# Patient Record
Sex: FEMALE | Race: BLACK OR AFRICAN AMERICAN | NOT HISPANIC OR LATINO | Employment: FULL TIME | URBAN - METROPOLITAN AREA
[De-identification: names, ages, dates, MRNs, and addresses within clinical notes are randomized per-mention and may not be internally consistent; named-entity substitution may affect disease eponyms.]

---

## 2017-01-08 RX ORDER — FLUTICASONE PROPIONATE 50 MCG
SPRAY, SUSPENSION (ML) NASAL
Qty: 16 BOTTLE | Refills: 0 | Status: SHIPPED | OUTPATIENT
Start: 2017-01-08 | End: 2017-09-28

## 2017-03-14 ENCOUNTER — PATIENT MESSAGE (OUTPATIENT)
Dept: FAMILY MEDICINE | Facility: CLINIC | Age: 52
End: 2017-03-14

## 2017-03-14 DIAGNOSIS — Z23 NEED FOR MMR VACCINE: Primary | ICD-10-CM

## 2017-03-15 ENCOUNTER — CLINICAL SUPPORT (OUTPATIENT)
Dept: FAMILY MEDICINE | Facility: CLINIC | Age: 52
End: 2017-03-15
Payer: COMMERCIAL

## 2017-03-15 PROCEDURE — 90707 MMR VACCINE SC: CPT | Mod: S$GLB,,, | Performed by: FAMILY MEDICINE

## 2017-03-15 PROCEDURE — 99999 PR PBB SHADOW E&M-EST. PATIENT-LVL I: CPT | Mod: PBBFAC,,,

## 2017-03-15 PROCEDURE — 90471 IMMUNIZATION ADMIN: CPT | Mod: S$GLB,,, | Performed by: FAMILY MEDICINE

## 2017-06-16 ENCOUNTER — PATIENT MESSAGE (OUTPATIENT)
Dept: FAMILY MEDICINE | Facility: CLINIC | Age: 52
End: 2017-06-16

## 2017-06-16 DIAGNOSIS — M25.539 PAIN IN WRIST, UNSPECIFIED LATERALITY: ICD-10-CM

## 2017-06-16 DIAGNOSIS — M25.512 LEFT SHOULDER PAIN, UNSPECIFIED CHRONICITY: Primary | ICD-10-CM

## 2017-06-20 ENCOUNTER — PATIENT MESSAGE (OUTPATIENT)
Dept: FAMILY MEDICINE | Facility: CLINIC | Age: 52
End: 2017-06-20

## 2017-07-06 ENCOUNTER — OFFICE VISIT (OUTPATIENT)
Dept: FAMILY MEDICINE | Facility: CLINIC | Age: 52
End: 2017-07-06
Payer: COMMERCIAL

## 2017-07-06 VITALS
SYSTOLIC BLOOD PRESSURE: 122 MMHG | TEMPERATURE: 99 F | RESPIRATION RATE: 18 BRPM | BODY MASS INDEX: 21.97 KG/M2 | HEIGHT: 67 IN | WEIGHT: 140 LBS | HEART RATE: 89 BPM | DIASTOLIC BLOOD PRESSURE: 84 MMHG

## 2017-07-06 DIAGNOSIS — J30.2 SEASONAL ALLERGIC RHINITIS, UNSPECIFIED ALLERGIC RHINITIS TRIGGER: Primary | ICD-10-CM

## 2017-07-06 PROCEDURE — 99213 OFFICE O/P EST LOW 20 MIN: CPT | Mod: 25,S$GLB,, | Performed by: FAMILY MEDICINE

## 2017-07-06 PROCEDURE — 96372 THER/PROPH/DIAG INJ SC/IM: CPT | Mod: S$GLB,,, | Performed by: FAMILY MEDICINE

## 2017-07-06 PROCEDURE — 99999 PR PBB SHADOW E&M-EST. PATIENT-LVL III: CPT | Mod: PBBFAC,,, | Performed by: FAMILY MEDICINE

## 2017-07-06 RX ORDER — BETAMETHASONE SODIUM PHOSPHATE AND BETAMETHASONE ACETATE 3; 3 MG/ML; MG/ML
12 INJECTION, SUSPENSION INTRA-ARTICULAR; INTRALESIONAL; INTRAMUSCULAR; SOFT TISSUE
Status: COMPLETED | OUTPATIENT
Start: 2017-07-06 | End: 2017-07-06

## 2017-07-06 RX ADMIN — BETAMETHASONE SODIUM PHOSPHATE AND BETAMETHASONE ACETATE 12 MG: 3; 3 INJECTION, SUSPENSION INTRA-ARTICULAR; INTRALESIONAL; INTRAMUSCULAR; SOFT TISSUE at 11:07

## 2017-07-06 NOTE — PROGRESS NOTES
CHIEF COMPLAINT: This is a 52-year-old female complaining of upper respiratory symptoms.    SUBJECTIVE: The patient complains of four-day history of sore throat, which is scratchy in quality.  She complains of sneezing, coughing, postnasal drip and headache.  Mucus is clear to green in color.  She denies fever or chills.  She's been taking Zyrtec and nasal steroids.    Patient complains of hot flashes and requests advice regarding supplements.    ROS:  GENERAL: Patient denies fever, chills, night sweats.  Patient denies weight gain or loss. Patient denies anorexia, fatigue, weakness or swollen glands.  SKIN: Patient denies rash.  HEENT: Patient denies ear pain, hearing loss, visual disturbance, eye irritation or discharge.  LUNGS: Patient denies wheeze or hemoptysis.  CARDIOVASCULAR: Patient denies chest pain, shortness of breath, palpitations, syncope or lower extremity edema.  GI: Patient denies abdominal pain, nausea, vomiting, diarrhea, constipation, blood in stool or melena.     OBJECTIVE:   GENERAL: Well-developed well-nourished, black female alert and oriented x3 in no acute distress.  Memory, judgment and cognition without deficit.  No audible wheezing.  SKIN: Clear without rash.  Normal color and tone.  HEENT: Eyes: Clear conjunctivae.  No scleral icterus.  Ears: Clear canals.  Clear TMs.  Nose: Moderate bilateral congestion.  Pharynx: Without injection or exudates.  NECK: Supple, normal range of motion.  No lymphadenopathy.    LUNGS: Clear to auscultation.  Normal respiratory effort.  CARDIOVASCULAR: Regular rhythm, normal S1, S2 without murmur, gallop or rub.    Discussed treatment of symptomatic menopause.  Reviewed options, including ERT, SSRI/SNRIs and supplements.    ASSESSMENT:  1. Seasonal allergic rhinitis, unspecified allergic rhinitis trigger      PLAN:   1.  Celestone 12 mg IM.  2.  Continue nonsedating antihistamine and nasal steroids.  3.  Patient will try OTC supplements for hot flashes.  4.   Follow-up if no improvement or worsening symptoms.

## 2017-07-09 ENCOUNTER — PATIENT MESSAGE (OUTPATIENT)
Dept: FAMILY MEDICINE | Facility: CLINIC | Age: 52
End: 2017-07-09

## 2017-07-10 RX ORDER — AMOXICILLIN 875 MG/1
875 TABLET, FILM COATED ORAL 2 TIMES DAILY
Qty: 20 TABLET | Refills: 0 | Status: SHIPPED | OUTPATIENT
Start: 2017-07-10 | End: 2017-07-20

## 2017-07-12 DIAGNOSIS — M25.539 WRIST PAIN, UNSPECIFIED LATERALITY: Primary | ICD-10-CM

## 2017-07-12 DIAGNOSIS — M25.512 LEFT SHOULDER PAIN, UNSPECIFIED CHRONICITY: ICD-10-CM

## 2017-07-14 ENCOUNTER — HOSPITAL ENCOUNTER (OUTPATIENT)
Dept: RADIOLOGY | Facility: HOSPITAL | Age: 52
Discharge: HOME | End: 2017-07-14
Attending: ORTHOPAEDIC SURGERY
Payer: COMMERCIAL

## 2017-07-14 ENCOUNTER — OFFICE VISIT (OUTPATIENT)
Dept: ORTHOPEDICS | Facility: CLINIC | Age: 52
End: 2017-07-14
Payer: COMMERCIAL

## 2017-07-14 VITALS — HEIGHT: 67 IN | WEIGHT: 140 LBS | BODY MASS INDEX: 21.97 KG/M2

## 2017-07-14 DIAGNOSIS — M25.512 LEFT SHOULDER PAIN, UNSPECIFIED CHRONICITY: ICD-10-CM

## 2017-07-14 DIAGNOSIS — M25.539 WRIST PAIN, UNSPECIFIED LATERALITY: ICD-10-CM

## 2017-07-14 DIAGNOSIS — M25.512 ACUTE PAIN OF LEFT SHOULDER: Primary | ICD-10-CM

## 2017-07-14 PROCEDURE — 73110 X-RAY EXAM OF WRIST: CPT | Mod: 26,50,, | Performed by: RADIOLOGY

## 2017-07-14 PROCEDURE — 99204 OFFICE O/P NEW MOD 45 MIN: CPT | Mod: S$GLB,,, | Performed by: PHYSICIAN ASSISTANT

## 2017-07-14 PROCEDURE — 99999 PR PBB SHADOW E&M-EST. PATIENT-LVL III: CPT | Mod: PBBFAC,,, | Performed by: PHYSICIAN ASSISTANT

## 2017-07-14 PROCEDURE — 73030 X-RAY EXAM OF SHOULDER: CPT | Mod: TC,PO,LT

## 2017-07-14 PROCEDURE — 73110 X-RAY EXAM OF WRIST: CPT | Mod: 50,TC,PO

## 2017-07-14 PROCEDURE — 73030 X-RAY EXAM OF SHOULDER: CPT | Mod: 26,LT,, | Performed by: RADIOLOGY

## 2017-07-14 NOTE — PROGRESS NOTES
CC: 52-year-old female left shoulder pain    HPI: 2 month history of of left shoulder pain.  She is working out doing her exercises and felt a pop in her left shoulder.  Not worked since that time.  Taken some NSAIDs with minimal relief.  Pain increases with any activity especially overhead activity.  She has altered her ADLS due to pain.    PMH:    Past Medical History:   Diagnosis Date    DDD (degenerative disc disease), cervical     Hyperlipidemia     Hypertension     Leiomyoma     Parkinson's disease     Polymenorrhea     Seasonal allergies        PSH:    Past Surgical History:   Procedure Laterality Date     SECTION, LOW TRANSVERSE      GANGLION CYST EXCISION      HERNIA REPAIR      Lysis of adhesions and enterotomy closure  2013    ROBOTIC ASSISTED HYSTERECTOMY  2013    Submaxillary gland drainage         Family Hx:    Family History   Problem Relation Age of Onset    Hypertension Mother     Cancer Mother     Diabetes Father     Hypertension Father     Hypertension Sister     Thyroid disease Sister     Hypertension Brother     Hypertension Sister     Hypertension Sister     Hypertension Brother     Cataracts Maternal Grandmother        Allergy:    Review of patient's allergies indicates:   Allergen Reactions    Allegra-d 12 hour [fexofenadine-pseudoephedrine] Palpitations    Celestone [betamethasone sodium phosphate] Palpitations       Medication:    Current Outpatient Prescriptions:     amoxicillin (AMOXIL) 875 MG tablet, Take 1 tablet (875 mg total) by mouth 2 (two) times daily., Disp: 20 tablet, Rfl: 0    hydrochlorothiazide (MICROZIDE) 12.5 mg capsule, Take 1 capsule (12.5 mg total) by mouth once daily., Disp: 90 capsule, Rfl: 3    multivitamin capsule, Take 1 capsule by mouth once daily., Disp: , Rfl:     rasagiline (AZILECT) 1 mg Tab, Take 1 mg by mouth once daily., Disp: , Rfl:     fluticasone (FLONASE) 50 mcg/actuation nasal spray, SPRAY TWICE IN  "EACH NOSTRIL EVERY DAY, Disp: 16 Bottle, Rfl: 0    Social History:    Social History     Social History    Marital status: Single     Spouse name: N/A    Number of children: N/A    Years of education: N/A     Occupational History     [employer]     Social History Main Topics    Smoking status: Never Smoker    Smokeless tobacco: Never Used    Alcohol use No    Drug use: No    Sexual activity: Not Currently     Other Topics Concern    Not on file     Social History Narrative    The patient is  and has one child. 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                  Vitals:   Ht 5' 6.5" (1.689 m)   Wt 63.5 kg (139 lb 15.9 oz)   LMP 05/28/2013   BMI 22.26 kg/m²      ROS:  GENERAL: No fever, chills, fatigability or weight loss.  SKIN: No rashes, itching or changes in color or texture of skin.  HEAD: No headaches or recent head trauma.  EYES: Visual acuity fine. No photophobia, ocular pain or diplopia.  EARS: Denies ear pain, discharge or vertigo.  NOSE: No loss of smell, no epistaxis or postnasal drip.  MOUTH & THROAT: No hoarseness or change in voice. No excessive gum bleeding.  NODES: Denies swollen glands.  CHEST: Denies NASH, cyanosis, wheezing, cough and sputum production.  CARDIOVASCULAR: Denies chest pain, PND, orthopnea or reduced exercise tolerance.  ABDOMEN: Appetite fine. No weight loss. Denies diarrhea, abdominal pain, hematemesis or blood in stool.  URINARY: No flank pain, dysuria or hematuria.  PERIPHERAL VASCULAR: No claudication or cyanosis.  NEUROLOGIC: No history of seizures, paralysis, alteration of gait or coordination.  MUSCULOSKELETAL: See HPI    PE:  APPEARANCE: Well nourished, well developed, in no acute distress.   HEAD: Normocephalic, atraumatic.  NEUROLOGIC: Cranial Nerves: II-XII grossly intact, also see MUSCULOSKELETAL  MUSCULOSKELETAL: left Shoulder Exam     Muscle Appearance:Normal  Grooming:Normal  Spine Alignment-Normal  Muscle " Atrophy-No  Deformities-No  Tenderness-Yes  Paresthesias-No  Range of Motion-decreased         Abd Pure-decreased         Abd Combined-decreased         Ext-decreased         Flex-decreased         Internal Rot-decreased         External Rot-decreased  Muscle Strength-decreased  Sensation-normal  Reflexes-Normal  Crepitus-Yes  Impingement-Yes  Apprehension-Yes  Fatigue-Yes  Scapular Winging-No  Muscle Tightness-Yes  Instability-No  Tests on Exam-no evidence of instability  Neurovascular Status-Normal  Skin-Normal  Positive Drop Arm testYes    Assessment:           Diagnosis:              1.  Left shoulder impingement syndrome                   Diagnostic Studies  MRI-Yes  X-Ray-No  EMG/NCV-No  Arthrogram-No  Bone Scan-No  CT Scan-No  Doppler-No  ESR-No  CRP-No  CBC with Diff-No   Rheumatoid/Arthritis Panel-No      Plan:                                                 1. PT-no                                                 2.OT-no                                          3.NSAID-no                                        4. Narcotics-no                                     5. Wound care-N/A                                 6. Rest-no                                           7. Surgery-no  discuss possible ATS depending on MRI findings                                      8. MAYI Hose-no                                    9. Anticoagulation therapy-no               10. Elevation-no                                     11. Crutches-no                                    12. Walker-no             13. Cane no                        14. Referral-no                                     15.Injection-no                            16. Splint   /    Cast   /   Cast Shoe-No              17. RICE            18. Follow up-  after MRI

## 2017-07-14 NOTE — LETTER
July 14, 2017      Lalita Patterson MD  [address]  [place] [address]           [facility]  [address]  [place] [address]  Phone: [phone]  Fax: [phone]          Patient: Rosalva Stauffer   MR Number: [id number]   YOB: 1965   Date of Visit: 7/14/2017       Dear Dr. Lalita Patterson:    Thank you for referring Rosalva Stauffer to me for evaluation. Attached you will find relevant portions of my assessment and plan of care.    If you have questions, please do not hesitate to call me. I look forward to following Rosalva Stauffer along with you.    Sincerely,    Herb Dang PA-C    Enclosure  CC:  No Recipients    If you would like to receive this communication electronically, please contact [email or web address] or [phone] to request more information on [employer] access.    For providers and/or their staff who would like to refer a patient to [facility], please contact us through our one-stop-shop provider referral line, [facility] , at [phone].    If you feel you have received this communication in error or would no longer like to receive these types of communications, please e-mail [email or web address]

## 2017-07-14 NOTE — PATIENT INSTRUCTIONS
Shoulder Arthroscopy  The shoulder is your bodys most flexible joint. It lets the arm move in almost any direction. But this flexibility has a price -- it makes the joint prone to injury. If you have a shoulder problem, a surgical procedure called arthroscopy can help.     A camera in the arthroscope allows your surgeon to view your shoulder joint on a monitor.   Your orthopaedic evaluation  Your doctor will ask about your symptoms and the history of your shoulder problem. He or she will examine your shoulder and may give you tests, such as an X-ray or MRI. These help your doctor find the cause of your shoulder problem.  Arthroscopy: Looking inside your joint  Arthroscopy is a procedure that allows your doctor to see and work inside your shoulder joint. Your doctor makes small incision in your shoulder and inserts a long, thin, lighted instrument, called an arthroscope. During surgery, the arthroscope sends live video images from inside your joint to a screen that your doctor views. Using these images, your doctor can diagnose and treat your shoulder problem. Because arthroscopy uses much smaller incisions than open surgery, recovery is often shorter and less painful.  Risks and possible complications of shoulder arthroscopy  · Stiffness or ongoing pain in your shoulder  · Bleeding or blood clots  · Infection  · Damage to nerves or blood vessels  You may still need open surgery after having arthroscopy.  Date Last Reviewed: 9/10/2015  © 5667-1005 [employer]. [address] [place] [address], [place] [address]. All rights reserved. This information is not intended as a substitute for professional medical care. Always follow your healthcare professional's instructions.

## 2017-07-17 ENCOUNTER — PATIENT MESSAGE (OUTPATIENT)
Dept: FAMILY MEDICINE | Facility: CLINIC | Age: 52
End: 2017-07-17

## 2017-07-20 ENCOUNTER — TELEPHONE (OUTPATIENT)
Dept: RADIOLOGY | Facility: HOSPITAL | Age: 52
End: 2017-07-20

## 2017-07-21 ENCOUNTER — HOSPITAL ENCOUNTER (OUTPATIENT)
Dept: RADIOLOGY | Facility: HOSPITAL | Age: 52
Discharge: HOME | End: 2017-07-21
Attending: ORTHOPAEDIC SURGERY
Payer: COMMERCIAL

## 2017-07-21 DIAGNOSIS — M25.512 ACUTE PAIN OF LEFT SHOULDER: ICD-10-CM

## 2017-07-21 PROCEDURE — 73221 MRI JOINT UPR EXTREM W/O DYE: CPT | Mod: TC,PO,LT

## 2017-07-21 PROCEDURE — 73221 MRI JOINT UPR EXTREM W/O DYE: CPT | Mod: 26,LT,, | Performed by: RADIOLOGY

## 2017-07-25 ENCOUNTER — PATIENT MESSAGE (OUTPATIENT)
Dept: ORTHOPEDICS | Facility: CLINIC | Age: 52
End: 2017-07-25

## 2017-07-28 ENCOUNTER — OFFICE VISIT (OUTPATIENT)
Dept: ORTHOPEDICS | Facility: CLINIC | Age: 52
End: 2017-07-28
Payer: COMMERCIAL

## 2017-07-28 VITALS
WEIGHT: 139.75 LBS | DIASTOLIC BLOOD PRESSURE: 81 MMHG | SYSTOLIC BLOOD PRESSURE: 128 MMHG | RESPIRATION RATE: 16 BRPM | HEART RATE: 75 BPM | BODY MASS INDEX: 22.46 KG/M2 | HEIGHT: 66 IN

## 2017-07-28 DIAGNOSIS — M24.812 INTERNAL DERANGEMENT OF LEFT SHOULDER: Primary | ICD-10-CM

## 2017-07-28 DIAGNOSIS — Z01.818 PRE-OP TESTING: ICD-10-CM

## 2017-07-28 DIAGNOSIS — M75.102 TEAR OF LEFT ROTATOR CUFF, UNSPECIFIED TEAR EXTENT: Primary | ICD-10-CM

## 2017-07-28 PROCEDURE — 99213 OFFICE O/P EST LOW 20 MIN: CPT | Mod: S$GLB,,, | Performed by: PHYSICIAN ASSISTANT

## 2017-07-28 PROCEDURE — 99999 PR PBB SHADOW E&M-EST. PATIENT-LVL III: CPT | Mod: PBBFAC,,, | Performed by: PHYSICIAN ASSISTANT

## 2017-07-28 NOTE — PATIENT INSTRUCTIONS
Shoulder Arthroscopy  The shoulder is your bodys most flexible joint. It lets the arm move in almost any direction. But this flexibility has a price -- it makes the joint prone to injury. If you have a shoulder problem, a surgical procedure called arthroscopy can help.     A camera in the arthroscope allows your surgeon to view your shoulder joint on a monitor.   Your orthopaedic evaluation  Your doctor will ask about your symptoms and the history of your shoulder problem. He or she will examine your shoulder and may give you tests, such as an X-ray or MRI. These help your doctor find the cause of your shoulder problem.  Arthroscopy: Looking inside your joint  Arthroscopy is a procedure that allows your doctor to see and work inside your shoulder joint. Your doctor makes small incision in your shoulder and inserts a long, thin, lighted instrument, called an arthroscope. During surgery, the arthroscope sends live video images from inside your joint to a screen that your doctor views. Using these images, your doctor can diagnose and treat your shoulder problem. Because arthroscopy uses much smaller incisions than open surgery, recovery is often shorter and less painful.  Risks and possible complications of shoulder arthroscopy  · Stiffness or ongoing pain in your shoulder  · Bleeding or blood clots  · Infection  · Damage to nerves or blood vessels  You may still need open surgery after having arthroscopy.  Date Last Reviewed: 9/10/2015  © 6895-5148 [employer]. [address] [place] [address], [place] [address]. All rights reserved. This information is not intended as a substitute for professional medical care. Always follow your healthcare professional's instructions.

## 2017-07-28 NOTE — PROGRESS NOTES
CC: 52-year-old female left shoulder pain    HPI: 2 month history of of left shoulder pain.  She is working out doing her exercises and felt a pop in her left shoulder. Has not worked out since that time.  Taken some NSAIDs with minimal relief.  Pain increases with any activity especially overhead activity.  She has altered her ADLS due to pain.    PMH:    Past Medical History:   Diagnosis Date    DDD (degenerative disc disease), cervical     Hyperlipidemia     Hypertension     Leiomyoma     Parkinson's disease     Polymenorrhea     Seasonal allergies        PSH:    Past Surgical History:   Procedure Laterality Date     SECTION, LOW TRANSVERSE      GANGLION CYST EXCISION      HERNIA REPAIR      Lysis of adhesions and enterotomy closure  2013    ROBOTIC ASSISTED HYSTERECTOMY  2013    Submaxillary gland drainage         Family Hx:    Family History   Problem Relation Age of Onset    Hypertension Mother     Cancer Mother     Diabetes Father     Hypertension Father     Hypertension Sister     Thyroid disease Sister     Hypertension Brother     Hypertension Sister     Hypertension Sister     Hypertension Brother     Cataracts Maternal Grandmother        Allergy:    Review of patient's allergies indicates:   Allergen Reactions    Allegra-d 12 hour [fexofenadine-pseudoephedrine] Palpitations    Celestone [betamethasone sodium phosphate] Palpitations       Medication:    Current Outpatient Prescriptions:     fluticasone (FLONASE) 50 mcg/actuation nasal spray, SPRAY TWICE IN EACH NOSTRIL EVERY DAY, Disp: 16 Bottle, Rfl: 0    hydrochlorothiazide (MICROZIDE) 12.5 mg capsule, Take 1 capsule (12.5 mg total) by mouth once daily., Disp: 90 capsule, Rfl: 3    multivitamin capsule, Take 1 capsule by mouth once daily., Disp: , Rfl:     rasagiline (AZILECT) 1 mg Tab, Take 1 mg by mouth once daily., Disp: , Rfl:     Social History:    Social History     Social History    Marital  "status: Single     Spouse name: N/A    Number of children: N/A    Years of education: N/A     Occupational History     [employer]     Social History Main Topics    Smoking status: Never Smoker    Smokeless tobacco: Never Used    Alcohol use No    Drug use: No    Sexual activity: Not Currently     Other Topics Concern    Not on file     Social History Narrative    The patient is  and has one child. 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                  Vitals:   /81   Pulse 75   Resp 16   Ht 5' 6" (1.676 m)   Wt 63.4 kg (139 lb 12.4 oz)   LMP 05/28/2013   BMI 22.56 kg/m²      ROS:  GENERAL: No fever, chills, fatigability or weight loss.  SKIN: No rashes, itching or changes in color or texture of skin.  HEAD: No headaches or recent head trauma.  EYES: Visual acuity fine. No photophobia, ocular pain or diplopia.  EARS: Denies ear pain, discharge or vertigo.  NOSE: No loss of smell, no epistaxis or postnasal drip.  MOUTH & THROAT: No hoarseness or change in voice. No excessive gum bleeding.  NODES: Denies swollen glands.  CHEST: Denies NASH, cyanosis, wheezing, cough and sputum production.  CARDIOVASCULAR: Denies chest pain, PND, orthopnea or reduced exercise tolerance.  ABDOMEN: Appetite fine. No weight loss. Denies diarrhea, abdominal pain, hematemesis or blood in stool.  URINARY: No flank pain, dysuria or hematuria.  PERIPHERAL VASCULAR: No claudication or cyanosis.  NEUROLOGIC: No history of seizures, paralysis, alteration of gait or coordination.  MUSCULOSKELETAL: See HPI    PE:  APPEARANCE: Well nourished, well developed, in no acute distress.   HEAD: Normocephalic, atraumatic.  NEUROLOGIC: Cranial Nerves: II-XII grossly intact, also see MUSCULOSKELETAL  MUSCULOSKELETAL: left Shoulder Exam     Muscle Appearance:Normal  Grooming:Normal  Spine Alignment-Normal  Muscle Atrophy-No  Deformities-No  Tenderness-Yes  Paresthesias-No  Range of Motion-decreased         Abd Pure-decreased         Abd " Combined-decreased         Ext-decreased         Flex-decreased         Internal Rot-decreased         External Rot-decreased  Muscle Strength-decreased  Sensation-normal  Reflexes-Normal  Crepitus-Yes  Impingement-Yes  Apprehension-Yes  Fatigue-Yes  Scapular Winging-No  Muscle Tightness-Yes  Instability-No  Tests on Exam-no evidence of instability  Neurovascular Status-Normal  Skin-Normal  Positive Drop Arm testYes    Assessment:           Diagnosis:              1.  Left shoulder impingement syndrome               2.  Left shoulder rotator cuff tear    Diagnostic Studies  MRI-Yes agree with findings ofOsteoarthritic change at the a.c. joint with slight inferior lateral positioning type III acromion.    2.  Supra-and infraspinatus tendinitis with probable partial-thickness tears of the articular surface without a full-thickness tear noted.    3.  Subscapularis tendinitis without tear.    4.  Trace fluid noted in the subacromial and subdeltoid bursa as well as extending within the biceps tendon sheath.  X-Ray-No      Plan:                                                 1. PT-no                                                 2.OT-no                                          3.NSAID-no                                        4. Narcotics-no                                     5. Wound care-N/A                                 6. Rest-no                                           7. Surgery-yes the patient will benefit from a left shoulder ATS                                      8. MAYI Hose-no                                    9. Anticoagulation therapy-no               10. Elevation-no                                     11. Crutches-no                                    12. Walker-no             13. Cane no                        14. Referral-no                                     15.Injection-no                            16. Splint   /    Cast   /   Cast Shoe-No              17. RICE            18. Follow up-  all  the patient's questions and concerns were answered by Dr Orosco and myself.  She elects to proceed with a left shoulder ATS to decrease pain, increase function, and improved quality of life.

## 2017-08-08 ENCOUNTER — OFFICE VISIT (OUTPATIENT)
Dept: FAMILY MEDICINE | Facility: CLINIC | Age: 52
End: 2017-08-08
Payer: COMMERCIAL

## 2017-08-08 VITALS
TEMPERATURE: 96 F | BODY MASS INDEX: 22.11 KG/M2 | SYSTOLIC BLOOD PRESSURE: 132 MMHG | WEIGHT: 140.88 LBS | HEIGHT: 67 IN | RESPIRATION RATE: 16 BRPM | DIASTOLIC BLOOD PRESSURE: 79 MMHG | OXYGEN SATURATION: 96 % | HEART RATE: 72 BPM

## 2017-08-08 DIAGNOSIS — N32.9 BLADDER PROBLEM: Primary | ICD-10-CM

## 2017-08-08 LAB
BILIRUB SERPL-MCNC: NEGATIVE MG/DL
BLOOD URINE, POC: NEGATIVE
COLOR, POC UA: CLEAR
GLUCOSE UR QL STRIP: NEGATIVE
KETONES UR QL STRIP: NEGATIVE
LEUKOCYTE ESTERASE URINE, POC: NEGATIVE
NITRITE, POC UA: NEGATIVE
PH, POC UA: 5
PROTEIN, POC: NEGATIVE
SPECIFIC GRAVITY, POC UA: 1
UROBILINOGEN, POC UA: NEGATIVE

## 2017-08-08 PROCEDURE — 3078F DIAST BP <80 MM HG: CPT | Mod: S$GLB,,, | Performed by: FAMILY MEDICINE

## 2017-08-08 PROCEDURE — 87086 URINE CULTURE/COLONY COUNT: CPT

## 2017-08-08 PROCEDURE — 99999 PR PBB SHADOW E&M-EST. PATIENT-LVL III: CPT | Mod: PBBFAC,,, | Performed by: FAMILY MEDICINE

## 2017-08-08 PROCEDURE — 99213 OFFICE O/P EST LOW 20 MIN: CPT | Mod: 25,S$GLB,, | Performed by: FAMILY MEDICINE

## 2017-08-08 PROCEDURE — 3008F BODY MASS INDEX DOCD: CPT | Mod: S$GLB,,, | Performed by: FAMILY MEDICINE

## 2017-08-08 PROCEDURE — 81002 URINALYSIS NONAUTO W/O SCOPE: CPT | Mod: S$GLB,,, | Performed by: FAMILY MEDICINE

## 2017-08-08 PROCEDURE — 3075F SYST BP GE 130 - 139MM HG: CPT | Mod: S$GLB,,, | Performed by: FAMILY MEDICINE

## 2017-08-08 NOTE — PROGRESS NOTES
CHIEF COMPLAINT: This is a 52-year-old female complaining of bladder problem.    SUBJECTIVE: The patient complains of a one-week history of urinary pressure at the end of urination.  She denies dysuria, frequency or hematuria.  She denies abdominal pain, nausea, vomiting.  She's had intermittent lower back pain.  She denies fever, chills.  Patient reports no new medications or change in diet.    ROS:  GENERAL: Patient denies fever, chills, night sweats.  Patient denies weight gain or loss. Patient denies anorexia, fatigue, weakness or swollen glands.  SKIN: Patient denies rash.  LUNGS: Patient denies cough, wheeze or hemoptysis.  CARDIOVASCULAR: Patient denies chest pain, shortness of breath, palpitations, syncope or lower extremity edema.  GI: Patient denies abdominal pain, nausea, vomiting, diarrhea, constipation, blood in stool or melena.  GENITOURINARY: Patient denies pelvic pain, vaginal discharge, itch or odor. Patient denies irregular vaginal bleeding.      OBJECTIVE:   GENERAL: Well-developed well-nourished , black female alert and oriented x3 in no acute distress.  Memory, judgment and cognition without deficit.  SKIN: Clear without rash.  Normal color and tone.  HEENT: Eyes: Clear conjunctivae.  No scleral icterus.    NECK: Supple, normal range of motion.    LUNGS: Clear to auscultation.  Normal respiratory effort.  CARDIOVASCULAR: Regular rhythm, normal S1, S2 without murmur, gallop or rub.  BACK: No CVA or spinal tenderness.  ABDOMEN: Normal appearance.  Active bowel sounds.  Soft, nontender without mass or organomegaly.  No rebound or guarding.  EXTREMITIES: Without cyanosis, clubbing or edema.  Distal pulses 2+ and equal.      UA dip: Negative.    ASSESSMENT:  Urinary pressure of uncertain etiology.    PLAN:   1.  Urine culture sensitivity.  2.  May use bladder analgesic such as Uristat or Azo-Standard.  3.  Follow-up if no improvement or worsening symptoms.

## 2017-08-09 LAB — BACTERIA UR CULT: NO GROWTH

## 2017-08-18 ENCOUNTER — OFFICE VISIT (OUTPATIENT)
Dept: FAMILY MEDICINE | Facility: CLINIC | Age: 52
End: 2017-08-18
Payer: COMMERCIAL

## 2017-08-18 VITALS
TEMPERATURE: 97 F | DIASTOLIC BLOOD PRESSURE: 80 MMHG | OXYGEN SATURATION: 98 % | BODY MASS INDEX: 22.15 KG/M2 | WEIGHT: 141.13 LBS | HEART RATE: 86 BPM | HEIGHT: 67 IN | RESPIRATION RATE: 16 BRPM | SYSTOLIC BLOOD PRESSURE: 130 MMHG

## 2017-08-18 DIAGNOSIS — R10.9 ABDOMINAL CRAMPING: Primary | ICD-10-CM

## 2017-08-18 PROCEDURE — 99214 OFFICE O/P EST MOD 30 MIN: CPT | Mod: S$GLB,,, | Performed by: FAMILY MEDICINE

## 2017-08-18 PROCEDURE — 3075F SYST BP GE 130 - 139MM HG: CPT | Mod: S$GLB,,, | Performed by: FAMILY MEDICINE

## 2017-08-18 PROCEDURE — 3079F DIAST BP 80-89 MM HG: CPT | Mod: S$GLB,,, | Performed by: FAMILY MEDICINE

## 2017-08-18 PROCEDURE — 99999 PR PBB SHADOW E&M-EST. PATIENT-LVL IV: CPT | Mod: PBBFAC,,, | Performed by: FAMILY MEDICINE

## 2017-08-18 PROCEDURE — 3008F BODY MASS INDEX DOCD: CPT | Mod: S$GLB,,, | Performed by: FAMILY MEDICINE

## 2017-08-18 RX ORDER — DICYCLOMINE HYDROCHLORIDE 20 MG/1
20 TABLET ORAL 4 TIMES DAILY
Qty: 30 TABLET | Refills: 0 | Status: SHIPPED | OUTPATIENT
Start: 2017-08-18 | End: 2017-09-17

## 2017-08-18 NOTE — PROGRESS NOTES
Subjective:       Patient ID: Rosalva Stauffer is a 52 y.o. female.    Chief Complaint: Abdominal Pain      HPI  Ms. Stauffer presents to clinic today for complaints of abdominal pain and nausea.   She states it started this morning.   She states it feels like a cramping pain.   She denies any fever.   She denies any diarrhea.   She states she ate her normal things yesterday.   She denies any dysuria or hematuria.     Review of Systems   Constitutional: Negative for fever.   Gastrointestinal: Positive for abdominal pain and nausea. Negative for blood in stool, constipation, diarrhea and vomiting.   Genitourinary: Negative for dysuria and hematuria.       Medication List with Changes/Refills   Current Medications    FLUTICASONE (FLONASE) 50 MCG/ACTUATION NASAL SPRAY    SPRAY TWICE IN EACH NOSTRIL EVERY DAY    HYDROCHLOROTHIAZIDE (MICROZIDE) 12.5 MG CAPSULE    Take 1 capsule (12.5 mg total) by mouth once daily.    MULTIVITAMIN CAPSULE    Take 1 capsule by mouth once daily.    RASAGILINE (AZILECT) 1 MG TAB    Take 1 mg by mouth once daily.       Patient Active Problem List   Diagnosis    Essential hypertension    Hyperlipidemia    Seasonal allergies    Parkinson's disease         Objective:     Physical Exam   Constitutional: She is oriented to person, place, and time. She appears well-developed and well-nourished. No distress.   HENT:   Head: Normocephalic and atraumatic.   Right Ear: External ear normal.   Left Ear: External ear normal.   Mouth/Throat: No oropharyngeal exudate.   Eyes: EOM are normal. Right eye exhibits no discharge. Left eye exhibits no discharge.   Cardiovascular: Normal rate and regular rhythm.    Pulmonary/Chest: Effort normal and breath sounds normal. No respiratory distress. She has no wheezes.   Abdominal: Soft. Bowel sounds are normal. She exhibits no distension. There is no tenderness. There is no rebound and no guarding.   Musculoskeletal: She exhibits no edema.   Neurological: She  is alert and oriented to person, place, and time.   Skin: Skin is warm and dry. She is not diaphoretic. No erythema.   Psychiatric: She has a normal mood and affect.   Vitals reviewed.    Vitals:    08/18/17 1004   BP: 130/80   Pulse: 86   Resp: 16   Temp: 96.5 °F (35.8 °C)       Assessment/  PLAN     Abdominal cramping  -     dicyclomine (BENTYL) 20 mg tablet; Take 1 tablet (20 mg total) by mouth 4 (four) times daily.  Dispense: 30 tablet; Refill: 0  - eat bland diet, continue supportive care, rest and hydration     Plan as above   Return precautions advised         Karena Ortiz MD  [facility]

## 2017-08-18 NOTE — PATIENT INSTRUCTIONS
Abdominal Pain    Abdominal pain is pain in the stomach or belly area. Everyone has this pain from time to time. In many cases it goes away on its own. But abdominal pain can sometimes be due to a serious problem, such as appendicitis. So its important to know when to seek help.  Causes of abdominal pain  There are many possible causes of abdominal pain. Common causes in adults include:  · Constipation, diarrhea, or gas  · Stomach acid flowing back up into the esophagus (acid reflux or heartburn)  · Severe acid reflux, called GERD (gastroesophageal reflux disease)  · A sore in the lining of the stomach or small intestine (peptic ulcer)  · Inflammation of the gallbladder, liver, or pancreas  · Gallstones or kidney stones  · Appendicitis   · Intestinal blockage   · An internal organ pushing through a muscle or other tissue (hernia)  · Urinary tract infections  · In women, menstrual cramps, fibroids, or endometriosis  · Inflammation or infection of the intestines  Diagnosing the cause of abdominal pain  Your healthcare provider will do a physical exam help find the cause of your pain. If needed, tests will be ordered. Belly pain has many possible causes. So it can be hard to find the reason for your pain. Giving details about your pain can help. Tell your provider where and when you feel the pain, and what makes it better or worse. Also let your provider know if you have other symptoms such as:  · Fever  · Tiredness  · Upset stomach (nausea)  · Vomiting  · Changes in bathroom habits  Treating abdominal pain  Some causes of pain need emergency medical treatment right away. These include appendicitis or a bowel blockage. Other problems can be treated with rest, fluids, or medicines. Your healthcare provider can give you specific instructions for treatment or self-care based on what is causing your pain.  If you have vomiting or diarrhea, sip water or other clear fluids. When you are ready to eat solid foods again,  start with small amounts of easy-to-digest, low-fat foods. These include apple sauce, toast, or crackers.   When to seek medical care  Call 911 or go to the hospital right away if you:  · Cant pass stool and are vomiting  · Are vomiting blood or have bloody diarrhea or black, tarry diarrhea  · Have chest, neck, or shoulder pain  · Feel like you might pass out  · Have pain in your shoulder blades with nausea  · Have sudden, severe belly pain  · Have new, severe pain unlike any you have felt before  · Have a belly that is rigid, hard, and tender to touch  Call your healthcare provider if you have:  · Pain for more than 5 days  · Bloating for more than 2 days  · Diarrhea for more than 5 days  · A fever of 100.4°F (38.0°C) or higher, or as directed by your provider  · Pain that gets worse  · Weight loss for no reason  · Continued lack of appetite  · Blood in your stool  How to prevent abdominal pain  Here are some tips to help prevent abdominal pain:  · Eat smaller amounts of food at one time.  · Avoid greasy, fried, or other high-fat foods.  · Avoid foods that give you gas.  · Exercise regularly.  · Drink plenty of fluids.  To help prevent GERD symptoms:  · Quit smoking.  · Reduce alcohol and certain foods that increase stomach acid.  · Avoid aspirin and over-the-counter pain and fever medicines (NSAIDS or nonsteroidal anti-inflammatory drugs), if possible  · Lose extra weight.  · Finish eating at least 2 hours before you go to bed or lie down.  · Raise the head of your bed.  Date Last Reviewed: 7/1/2016  © 5363-3446 "[employer]. [address] [place] [address], [place] [address]. All rights reserved. This information is not intended as a substitute for professional medical care. Always follow your healthcare professional's instructions.

## 2017-08-18 NOTE — LETTER
August 18, 2017                 [facility]  Family Medicine  [address] [address]  Phone: [phone]   August 18, 2017     Patient: Rosalva Stauffer   YOB: 1965   Date of Visit: 8/18/2017       To Whom it May Concern:    Rosalva Stauffer was seen in my clinic on 8/18/2017. She may return to work on 8/21/2017.    If you have any questions or concerns, please don't hesitate to call.    Sincerely,         Edin Abdalla LPN

## 2017-09-28 ENCOUNTER — LAB VISIT (OUTPATIENT)
Dept: LAB | Facility: HOSPITAL | Age: 52
End: 2017-09-28
Attending: FAMILY MEDICINE
Payer: COMMERCIAL

## 2017-09-28 ENCOUNTER — OFFICE VISIT (OUTPATIENT)
Dept: FAMILY MEDICINE | Facility: CLINIC | Age: 52
End: 2017-09-28
Payer: COMMERCIAL

## 2017-09-28 VITALS
TEMPERATURE: 96 F | HEART RATE: 65 BPM | WEIGHT: 140.44 LBS | DIASTOLIC BLOOD PRESSURE: 82 MMHG | HEIGHT: 67 IN | OXYGEN SATURATION: 100 % | SYSTOLIC BLOOD PRESSURE: 126 MMHG | RESPIRATION RATE: 17 BRPM | BODY MASS INDEX: 22.04 KG/M2

## 2017-09-28 DIAGNOSIS — G20.A1 PARKINSON'S DISEASE: ICD-10-CM

## 2017-09-28 DIAGNOSIS — Z12.31 ENCOUNTER FOR SCREENING MAMMOGRAM FOR MALIGNANT NEOPLASM OF BREAST: ICD-10-CM

## 2017-09-28 DIAGNOSIS — E78.5 HYPERLIPIDEMIA, UNSPECIFIED HYPERLIPIDEMIA TYPE: ICD-10-CM

## 2017-09-28 DIAGNOSIS — Z00.00 PREVENTATIVE HEALTH CARE: Primary | ICD-10-CM

## 2017-09-28 DIAGNOSIS — I10 ESSENTIAL HYPERTENSION: ICD-10-CM

## 2017-09-28 DIAGNOSIS — Z00.00 PREVENTATIVE HEALTH CARE: ICD-10-CM

## 2017-09-28 LAB
ALBUMIN SERPL BCP-MCNC: 3.7 G/DL
ALP SERPL-CCNC: 66 U/L
ALT SERPL W/O P-5'-P-CCNC: 49 U/L
ANION GAP SERPL CALC-SCNC: 11 MMOL/L
AST SERPL-CCNC: 44 U/L
BASOPHILS # BLD AUTO: 0.02 K/UL
BASOPHILS NFR BLD: 0.4 %
BILIRUB SERPL-MCNC: 0.4 MG/DL
BUN SERPL-MCNC: 17 MG/DL
CALCIUM SERPL-MCNC: 9.6 MG/DL
CHLORIDE SERPL-SCNC: 103 MMOL/L
CHOLEST SERPL-MCNC: 228 MG/DL
CHOLEST/HDLC SERPL: 3.4 {RATIO}
CO2 SERPL-SCNC: 22 MMOL/L
CREAT SERPL-MCNC: 1 MG/DL
DIFFERENTIAL METHOD: NORMAL
EOSINOPHIL # BLD AUTO: 0.1 K/UL
EOSINOPHIL NFR BLD: 2.3 %
ERYTHROCYTE [DISTWIDTH] IN BLOOD BY AUTOMATED COUNT: 12.9 %
EST. GFR  (AFRICAN AMERICAN): >60 ML/MIN/1.73 M^2
EST. GFR  (NON AFRICAN AMERICAN): >60 ML/MIN/1.73 M^2
GLUCOSE SERPL-MCNC: 75 MG/DL
HCT VFR BLD AUTO: 40 %
HDLC SERPL-MCNC: 68 MG/DL
HDLC SERPL: 29.8 %
HGB BLD-MCNC: 13 G/DL
LDLC SERPL CALC-MCNC: 147.8 MG/DL
LYMPHOCYTES # BLD AUTO: 1.4 K/UL
LYMPHOCYTES NFR BLD: 29.6 %
MCH RBC QN AUTO: 29.2 PG
MCHC RBC AUTO-ENTMCNC: 32.5 G/DL
MCV RBC AUTO: 90 FL
MONOCYTES # BLD AUTO: 0.4 K/UL
MONOCYTES NFR BLD: 7.3 %
NEUTROPHILS # BLD AUTO: 2.9 K/UL
NEUTROPHILS NFR BLD: 60.4 %
NONHDLC SERPL-MCNC: 160 MG/DL
PLATELET # BLD AUTO: 208 K/UL
PMV BLD AUTO: 11.9 FL
POTASSIUM SERPL-SCNC: 4.4 MMOL/L
PROT SERPL-MCNC: 7.5 G/DL
RBC # BLD AUTO: 4.45 M/UL
SODIUM SERPL-SCNC: 136 MMOL/L
TRIGL SERPL-MCNC: 61 MG/DL
WBC # BLD AUTO: 4.8 K/UL

## 2017-09-28 PROCEDURE — 99396 PREV VISIT EST AGE 40-64: CPT | Mod: S$GLB,,, | Performed by: FAMILY MEDICINE

## 2017-09-28 PROCEDURE — 36415 COLL VENOUS BLD VENIPUNCTURE: CPT | Mod: PO

## 2017-09-28 PROCEDURE — 84439 ASSAY OF FREE THYROXINE: CPT

## 2017-09-28 PROCEDURE — 99999 PR PBB SHADOW E&M-EST. PATIENT-LVL III: CPT | Mod: PBBFAC,,, | Performed by: FAMILY MEDICINE

## 2017-09-28 PROCEDURE — 85025 COMPLETE CBC W/AUTO DIFF WBC: CPT

## 2017-09-28 PROCEDURE — 84443 ASSAY THYROID STIM HORMONE: CPT

## 2017-09-28 PROCEDURE — 80053 COMPREHEN METABOLIC PANEL: CPT

## 2017-09-28 PROCEDURE — 80061 LIPID PANEL: CPT

## 2017-09-28 RX ORDER — HYDROCHLOROTHIAZIDE 12.5 MG/1
CAPSULE ORAL
Qty: 90 CAPSULE | Refills: 3 | Status: SHIPPED | OUTPATIENT
Start: 2017-09-28 | End: 2017-11-20 | Stop reason: SDUPTHER

## 2017-09-28 RX ORDER — HYDROCHLOROTHIAZIDE 12.5 MG/1
12.5 CAPSULE ORAL DAILY
Qty: 90 CAPSULE | Refills: 3 | Status: SHIPPED | OUTPATIENT
Start: 2017-09-28 | End: 2017-09-28 | Stop reason: SDUPTHER

## 2017-09-28 RX ORDER — PRAMIPEXOLE DIHYDROCHLORIDE 0.25 MG/1
0.5 TABLET ORAL 3 TIMES DAILY
COMMUNITY
End: 2018-01-23 | Stop reason: DRUGHIGH

## 2017-09-28 RX ORDER — PRAMIPEXOLE 0.75 MG/1
TABLET, EXTENDED RELEASE ORAL
Refills: 6 | COMMUNITY
Start: 2017-08-19 | End: 2017-09-28 | Stop reason: SDUPTHER

## 2017-09-28 NOTE — PROGRESS NOTES
CHIEF COMPLAINT: This 52-year-old female here for preventive health exam.    SUBJECTIVE: The patient is taking Azilect and Mirapex for Parkinson's disease per neurologist.  She has a tremor and was told that she had mild disease.  Her blood is controlled on HCTZ.  Patient reports that.  She is to have left rotator cuff surgery in December.  She is not exercising regularly.     Eye exam October 2016. Mammogram October 2016. Colonoscopy due in June 2018. Tdap September 2011. Flu vaccine September 2017.     ROS:  GENERAL: Patient denies fever, chills, night sweats. Patient denies weight gain or loss. Patient denies anorexia, fatigue, weakness or swollen glands.  SKIN: Patient denies rash or hair loss.  HEENT: Patient denies sore throat, ear pain, hearing loss, nasal congestion, or runny nose. Patient denies visual disturbance, eye irritation or discharge.  LUNGS: Patient denies cough, wheeze or hemoptysis.  CARDIOVASCULAR: Patient denies chest pain, shortness of breath, palpitations, syncope or lower extremity edema.  GI: Patient denies abdominal pain, nausea, vomiting, diarrhea, constipation, blood in stool or melena.  GENITOURINARY: Patient denies pelvic pain, vaginal discharge, itch or odor. Patient denies irregular vaginal bleeding. Patient denies dysuria, frequency, hematuria, nocturia, urgency or incontinence.  BREASTS: Patient denies breast pain, mass or nipple discharge.  MUSCULOSKELETAL: Patient denies joint pain, swelling, redness or warmth.  NEUROLOGIC: Patient denies headache, vertigo, paresthesias, weakness in limb, dysarthria, dysphagia or abnormality of gait.  PSYCHIATRIC: Patient denies anxiety, depression, or memory loss.     OBJECTIVE:   GENERAL: Well-developed well-nourished , black female alert and oriented x3, in no acute distress. Memory, judgment and cognition without deficit.   SKIN: Clear without rash. Normal color and tone.   HEENT: Eyes: Clear conjunctivae. No scleral icterus. Pupils equal  reactive to light and accommodation. Ears: Clear TMs. Clear canals. Nose: Without congestion. Pharynx: Without injection or exudates.   NECK: Supple, normal range of motion. No masses, lymphadenopathy or enlarged thyroid. No JVD. Carotids 2+ and equal. No bruits.   LUNGS: Clear to auscultation. Normal respiratory effort.   CARDIOVASCULAR: Regular rhythm, normal S1, S2 without murmur, gallop or rub.   BACK: No CVA or spinal tenderness.   BREASTS: No masses, tenderness or nipple discharge.   ABDOMEN: Normal appearance. Active bowel sounds. Soft, nontender without mass or organomegaly. No rebound or guarding. Well-healed incisional scars.   EXTREMITIES: Without cyanosis, clubbing or edema. Distal pulses 2+ and equal. Normal range of motion in all extremities. No joint effusion, erythema or warmth.   NEUROLOGIC: Cranial nerves II through XII without deficit. Motor strength equal bilaterally. Sensation normal to touch. Deep tendon reflexes 2+ and equal. Gait without abnormality. No tremor. Negative cerebellar signs.   PELVIC: External: Without lesions or inflammation. Vaginal: Cuff intact. Bimanual: Normal size and shape. Adnexa: Non-tender, without masses. Rectovaginal: Confirms, heme-negative stool x2.     ASSESSMENT:  1. Preventative health care    2. Essential hypertension    3. Hyperlipidemia, unspecified hyperlipidemia type    4. Parkinson's disease    5. Encounter for screening mammogram for malignant neoplasm of breast      PLAN:   1.  Exercise regularly.  2.  Age-appropriate counseling.  3.  Fasting lab.  4.  Mammogram.  5.  Refill medications.

## 2017-09-29 LAB
T4 FREE SERPL-MCNC: 1.09 NG/DL
TSH SERPL DL<=0.005 MIU/L-ACNC: 0.26 UIU/ML

## 2017-10-20 ENCOUNTER — PATIENT MESSAGE (OUTPATIENT)
Dept: FAMILY MEDICINE | Facility: CLINIC | Age: 52
End: 2017-10-20

## 2017-10-24 ENCOUNTER — OFFICE VISIT (OUTPATIENT)
Dept: FAMILY MEDICINE | Facility: CLINIC | Age: 52
End: 2017-10-24
Payer: COMMERCIAL

## 2017-10-24 ENCOUNTER — TELEPHONE (OUTPATIENT)
Dept: FAMILY MEDICINE | Facility: CLINIC | Age: 52
End: 2017-10-24

## 2017-10-24 VITALS
SYSTOLIC BLOOD PRESSURE: 124 MMHG | BODY MASS INDEX: 22.18 KG/M2 | HEART RATE: 88 BPM | WEIGHT: 141.31 LBS | RESPIRATION RATE: 18 BRPM | OXYGEN SATURATION: 99 % | TEMPERATURE: 97 F | HEIGHT: 67 IN | DIASTOLIC BLOOD PRESSURE: 78 MMHG

## 2017-10-24 DIAGNOSIS — N63.32 UNSPECIFIED LUMP IN AXILLARY TAIL OF THE LEFT BREAST: Primary | ICD-10-CM

## 2017-10-24 PROCEDURE — 99999 PR PBB SHADOW E&M-EST. PATIENT-LVL III: CPT | Mod: PBBFAC,,, | Performed by: FAMILY MEDICINE

## 2017-10-24 PROCEDURE — 99213 OFFICE O/P EST LOW 20 MIN: CPT | Mod: 25,S$GLB,, | Performed by: FAMILY MEDICINE

## 2017-10-24 NOTE — TELEPHONE ENCOUNTER
----- Message from Sandra Ortiz sent at 10/24/2017  9:15 AM CDT -----  Pt miss call...please call again at [phone].

## 2017-10-24 NOTE — PROGRESS NOTES
CHIEF COMPLAINT: This is a 52-year-old female complaining of possible lump in left axilla.    SUBJECTIVE: The patient noted small lump in left axilla 1 week ago, which is nonpainful.  Patient is having difficulty finding lump now.  She is to have mammogram in one week.  She denies fever, chills, breast tenderness or nipple discharge.    ROS:  GENERAL: Patient denies fever, chills, night sweats.  Patient denies weight gain or loss. Patient denies anorexia, fatigue, weakness or swollen glands.  SKIN: Patient denies rash.  LUNGS: Patient denies cough, wheeze or hemoptysis.  CARDIOVASCULAR: Patient denies chest pain, shortness of breath, palpitations, syncope or lower extremity edema.  GENITOURINARY: Patient denies pelvic pain, vaginal discharge, itch or odor. Patient denies irregular vaginal bleeding.  Patient denies dysuria, frequency, hematuria, nocturia, urgency or incontinence.  BREASTS: As above.  MUSCULOSKELETAL: Patient denies joint pain, swelling, redness or warmth.    OBJECTIVE:   GENERAL: Well-developed well-nourished , black female alert and oriented x3, in no acute distress. Memory, judgment and cognition without deficit.   SKIN: Clear without rash. Normal color and tone.   HEENT: Eyes: Clear conjunctivae. No scleral icterus.   NECK: Supple, normal range of motion.   LUNGS: Clear to auscultation. Normal respiratory effort.   CARDIOVASCULAR: Regular rhythm, normal S1, S2 without murmur, gallop or rub.   BREASTS: No masses, tenderness or nipple discharge.  No palpable left axillary nodules or tenderness.    ASSESSMENT:  1. Unspecified lump in axillary tail of the left breast      PLAN:   1.  Reassurance.  2.  Mammogram.

## 2017-11-10 ENCOUNTER — HOSPITAL ENCOUNTER (OUTPATIENT)
Dept: RADIOLOGY | Facility: HOSPITAL | Age: 52
Discharge: HOME | End: 2017-11-10
Attending: ORTHOPAEDIC SURGERY
Payer: COMMERCIAL

## 2017-11-10 ENCOUNTER — HOSPITAL ENCOUNTER (OUTPATIENT)
Dept: RADIOLOGY | Facility: HOSPITAL | Age: 52
Discharge: HOME | End: 2017-11-10
Attending: FAMILY MEDICINE
Payer: COMMERCIAL

## 2017-11-10 ENCOUNTER — CLINICAL SUPPORT (OUTPATIENT)
Dept: CARDIOLOGY | Facility: CLINIC | Age: 52
End: 2017-11-10
Payer: COMMERCIAL

## 2017-11-10 VITALS — BODY MASS INDEX: 22.13 KG/M2 | HEIGHT: 67 IN | WEIGHT: 141 LBS

## 2017-11-10 DIAGNOSIS — Z12.31 ENCOUNTER FOR SCREENING MAMMOGRAM FOR MALIGNANT NEOPLASM OF BREAST: ICD-10-CM

## 2017-11-10 DIAGNOSIS — Z01.818 PRE-OP TESTING: ICD-10-CM

## 2017-11-10 PROCEDURE — 77067 SCR MAMMO BI INCL CAD: CPT | Mod: TC

## 2017-11-10 PROCEDURE — 71020 XR CHEST PA AND LATERAL PRE-OP: CPT | Mod: 26,,, | Performed by: RADIOLOGY

## 2017-11-10 PROCEDURE — 71020 XR CHEST PA AND LATERAL PRE-OP: CPT | Mod: TC,PO

## 2017-11-10 PROCEDURE — 93000 ELECTROCARDIOGRAM COMPLETE: CPT | Mod: S$GLB,,, | Performed by: INTERNAL MEDICINE

## 2017-11-10 PROCEDURE — 77067 SCR MAMMO BI INCL CAD: CPT | Mod: 26,,, | Performed by: RADIOLOGY

## 2017-11-10 PROCEDURE — 77063 BREAST TOMOSYNTHESIS BI: CPT | Mod: 26,,, | Performed by: RADIOLOGY

## 2017-11-15 ENCOUNTER — PATIENT MESSAGE (OUTPATIENT)
Dept: FAMILY MEDICINE | Facility: CLINIC | Age: 52
End: 2017-11-15

## 2017-11-17 ENCOUNTER — LAB VISIT (OUTPATIENT)
Dept: LAB | Facility: HOSPITAL | Age: 52
End: 2017-11-17
Attending: FAMILY MEDICINE
Payer: COMMERCIAL

## 2017-11-17 ENCOUNTER — OFFICE VISIT (OUTPATIENT)
Dept: FAMILY MEDICINE | Facility: CLINIC | Age: 52
End: 2017-11-17
Payer: COMMERCIAL

## 2017-11-17 VITALS
BODY MASS INDEX: 21.8 KG/M2 | HEART RATE: 89 BPM | HEIGHT: 67 IN | SYSTOLIC BLOOD PRESSURE: 120 MMHG | TEMPERATURE: 97 F | OXYGEN SATURATION: 100 % | RESPIRATION RATE: 18 BRPM | WEIGHT: 138.88 LBS | DIASTOLIC BLOOD PRESSURE: 74 MMHG

## 2017-11-17 DIAGNOSIS — I10 ESSENTIAL HYPERTENSION: ICD-10-CM

## 2017-11-17 DIAGNOSIS — R73.9 ELEVATED BLOOD SUGAR: ICD-10-CM

## 2017-11-17 DIAGNOSIS — E78.5 HYPERLIPIDEMIA, UNSPECIFIED HYPERLIPIDEMIA TYPE: ICD-10-CM

## 2017-11-17 DIAGNOSIS — G20.A1 PARKINSON'S DISEASE: ICD-10-CM

## 2017-11-17 DIAGNOSIS — M75.112 INCOMPLETE TEAR OF LEFT ROTATOR CUFF: ICD-10-CM

## 2017-11-17 DIAGNOSIS — Z01.818 PRE-OP EXAMINATION: Primary | ICD-10-CM

## 2017-11-17 LAB
ESTIMATED AVG GLUCOSE: 123 MG/DL
HBA1C MFR BLD HPLC: 5.9 %

## 2017-11-17 PROCEDURE — 83036 HEMOGLOBIN GLYCOSYLATED A1C: CPT

## 2017-11-17 PROCEDURE — 99999 PR PBB SHADOW E&M-EST. PATIENT-LVL III: CPT | Mod: PBBFAC,,, | Performed by: FAMILY MEDICINE

## 2017-11-17 PROCEDURE — 99243 OFF/OP CNSLTJ NEW/EST LOW 30: CPT | Mod: S$GLB,,, | Performed by: FAMILY MEDICINE

## 2017-11-17 PROCEDURE — 36415 COLL VENOUS BLD VENIPUNCTURE: CPT | Mod: PO

## 2017-11-17 RX ORDER — AMOXICILLIN AND CLAVULANATE POTASSIUM 875; 125 MG/1; MG/1
1 TABLET, FILM COATED ORAL
COMMUNITY
End: 2018-01-23

## 2017-11-17 NOTE — PROGRESS NOTES
CHIEF COMPLAINT: This is a 52-year-old female here for preoperative clearance for left shoulder arthroscopy per Dr. John Orosco due to patient's chronic medical conditions.    SUBJECTIVE: Patient has intractable pain and limited range of motion left shoulder due to partial rotator cuff tear and osteoarthritis.  She is to have arthroscopic shoulder surgery in December.  Patient denies any previous problems with general anesthesia.  She denies family history of perioperative complications.  The patient is taking Azilect and Mirapex for Parkinson's disease per neurologist.  Her hypertension is controlled on HCTZ.  Patient has a history of hyperlipidemia.      Past Medical History:   Diagnosis Date    DDD (degenerative disc disease), cervical     Hyperlipidemia     Hypertension     Leiomyoma     Parkinson's disease     Polymenorrhea     Seasonal allergies        Past Surgical History:   Procedure Laterality Date     SECTION, LOW TRANSVERSE      GANGLION CYST EXCISION      HERNIA REPAIR      Lysis of adhesions and enterotomy closure  2013    ROBOTIC ASSISTED HYSTERECTOMY  2013    With unilateral SO    Submaxillary gland drainage         Social History     Social History    Marital status: Single     Spouse name: N/A    Number of children: N/A    Years of education: N/A     Occupational History     [employer]     Social History Main Topics    Smoking status: Never Smoker    Smokeless tobacco: Never Used    Alcohol use No    Drug use: No    Sexual activity: Not Currently     Other Topics Concern    None     Social History Narrative    The patient is  and has one child. 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                  Family History   Problem Relation Age of Onset    Hypertension Mother     Cancer Mother     Diabetes Father     Hypertension Father     Hypertension Sister     Thyroid disease Sister     Hypertension Brother     Hypertension Sister     Hypertension  Sister     Hypertension Brother     Cataracts Maternal Grandmother          ROS:  GENERAL: Patient denies fever, chills, night sweats. Patient denies weight gain or loss. Patient denies anorexia, fatigue, weakness or swollen glands.  SKIN: Patient denies rash or hair loss.  HEENT: Patient denies sore throat, ear pain, hearing loss, nasal congestion, or runny nose. Patient denies visual disturbance, eye irritation or discharge.  LUNGS: Patient denies cough, wheeze or hemoptysis.  CARDIOVASCULAR: Patient denies chest pain, shortness of breath, palpitations, syncope or lower extremity edema.  GI: Patient denies abdominal pain, nausea, vomiting, diarrhea, constipation, blood in stool or melena.  GENITOURINARY:  Patient denies dysuria, frequency, hematuria, nocturia, urgency or incontinence.  MUSCULOSKELETAL: Patient denies joint pain, swelling, redness or warmth.  NEUROLOGIC: Patient denies headache, vertigo, paresthesias, weakness in limb, dysarthria, dysphagia or abnormality of gait.  PSYCHIATRIC: Patient denies anxiety, depression, or memory loss.     OBJECTIVE:   GENERAL: Well-developed well-nourished , black female alert and oriented x3, in no acute distress. Memory, judgment and cognition without deficit.   SKIN: Clear without rash. Normal color and tone.   HEENT: Eyes: Clear conjunctivae. No scleral icterus. Pupils equal reactive to light and accommodation. Ears: Clear TMs. Clear canals. Nose: Without congestion. Pharynx: Without injection or exudates.   NECK: Supple, normal range of motion. No masses, lymphadenopathy or enlarged thyroid. No JVD. Carotids 2+ and equal. No bruits.   LUNGS: Clear to auscultation. Normal respiratory effort.   CARDIOVASCULAR: Regular rhythm, normal S1, S2 without murmur, gallop or rub.   BACK: No CVA or spinal tenderness.   ABDOMEN: Normal appearance. Active bowel sounds. Soft, nontender without mass or organomegaly. No rebound or guarding. Well-healed incisional scars.    EXTREMITIES: Without cyanosis, clubbing or edema. Distal pulses 2+ and equal. Normal range of motion in all extremities. No joint effusion, erythema or warmth.   NEUROLOGIC: Motor strength equal bilaterally. Sensation normal to touch. Deep tendon reflexes 2+ and equal. Gait without abnormality. No tremor.     Lab: Acceptable except for elevated blood sugar of 149.  Chest x-ray: Clear except for apical pleural-parenchymal plaques.  EKG: Normal sinus rhythm.  Possible LAE.  No acute changes.    ASSESSMENT:  1. Pre-op examination    2. Incomplete tear of left rotator cuff    3. Parkinson's disease    4. Hyperlipidemia, unspecified hyperlipidemia type    5. Essential hypertension    6. Elevated blood sugar      PLAN:   1.  Avoid NSAIDs and aspirin one week prior to surgery.  2.  Continue regular medications.  3.  Patient is cleared for surgery.  4.  Check A1c.    Thank you for allowing me to participate in the care of this patient.

## 2017-11-20 ENCOUNTER — PATIENT MESSAGE (OUTPATIENT)
Dept: FAMILY MEDICINE | Facility: CLINIC | Age: 52
End: 2017-11-20

## 2017-11-20 RX ORDER — HYDROCHLOROTHIAZIDE 12.5 MG/1
12.5 CAPSULE ORAL DAILY
Qty: 90 CAPSULE | Refills: 3 | Status: SHIPPED | OUTPATIENT
Start: 2017-11-20 | End: 2018-04-26 | Stop reason: SDUPTHER

## 2017-12-04 NOTE — PRE-PROCEDURE INSTRUCTIONS
Pre op instructions reviewed with patient per phone:    To confirm, Your surgeon has instructed you:  Surgery is scheduled 12/6/17 at 0700.      Please report to [facility] 1st floor main lobby by 0530.   Pre admit office to call afternoon prior to surgery with final arrival time      INSTRUCTIONS IMPORTANT!!!  ¨ Do not eat, drink, or smoke after 12 midnight-including water. OK to brush teeth, no gum, candy or mints!    ¨ Take only these medicines with a small swallow of water-morning of surgery.  Azilect, Pramipexole    ____  Do not wear makeup, including mascara.  ____  No powder, lotions or creams to surgical area.  ____  Please remove all jewelry, including piercings and leave at home.  ____  No money or valuables needed. Please leave at home.  ____  Please bring identification and insurance information to hospital.  ____  If going home the same day, arrange for a ride home. You will not be able to   drive if Anesthesia was used.  ____  Children, under 12 years old, must remain in the waiting room with an adult.  They are not allowed in patient areas.  ____  Wear loose fitting clothing. Allow for dressings, bandages.  ____  Stop Aspirin, Ibuprofen, Motrin and Aleve at least 5-7 days before surgery, unless otherwise instructed by your doctor, or the nurse.   You MAY use Tylenol/acetaminophen until day of surgery.  ____  If you take diabetic medication, do not take am of surgery unless instructed by   Doctor.  ____ Stop taking any Fish Oil supplement or any Vitamins that contain Vitamin E at least 5 days prior to surgery.          Bathing Instructions-- The night before surgery and the morning prior to coming to the hospital:   -Do not shave the surgical area.   -Shower and wash your hair and body as usual with anti-bacterial  soap and shampoo.   -Rinse your hair and body completely.   -Use one packet of hibiclens to wash the surgical site (using your hand) gently for 5 minutes.  Do not  scrub you skin too hard.   -Do not use hibiclens on your head, face, or genitals.   -Do not wash with anti-bacterial soap after you use the hibiclens.   -Rinse your body thoroughly.   -Dry with clean, soft towel.  Do not use lotion, cream, deodorant, or powders on   the surgical site.    Use antibacterial soap in place of hibiclens if your surgery is on the head, face or genitals.         Surgical Site Infection    Prevention of surgical site infections:     -Keep incisions clean and dry.   -Do not soak/submerge incisions in water until completely healed.   -Do not apply lotions, powders, creams, or deodorants to site.   -Always make sure hands are cleaned with antibacterial soap/ alcohol-based   prior to touching the surgical site.  (This includes doctors, nurses, staff, and yourself.)    Signs and symptoms:   -Redness and pain around the area where you had surgery   -Drainage of cloudy fluid from your surgical wound   -Fever over 100.4  I have read or had read and explained to me, and understand the above information.

## 2017-12-05 ENCOUNTER — ANESTHESIA EVENT (OUTPATIENT)
Dept: SURGERY | Facility: HOSPITAL | Age: 52
End: 2017-12-05
Payer: COMMERCIAL

## 2017-12-06 ENCOUNTER — HOSPITAL ENCOUNTER (OUTPATIENT)
Facility: HOSPITAL | Age: 52
Discharge: HOME | End: 2017-12-06
Attending: ORTHOPAEDIC SURGERY | Admitting: ORTHOPAEDIC SURGERY
Payer: COMMERCIAL

## 2017-12-06 ENCOUNTER — ANESTHESIA (OUTPATIENT)
Dept: SURGERY | Facility: HOSPITAL | Age: 52
End: 2017-12-06
Payer: COMMERCIAL

## 2017-12-06 ENCOUNTER — TELEPHONE (OUTPATIENT)
Dept: ORTHOPEDICS | Facility: CLINIC | Age: 52
End: 2017-12-06

## 2017-12-06 DIAGNOSIS — M75.42 IMPINGEMENT SYNDROME OF LEFT SHOULDER: ICD-10-CM

## 2017-12-06 DIAGNOSIS — Z98.890 POST-OPERATIVE STATE: ICD-10-CM

## 2017-12-06 DIAGNOSIS — M75.40 IMPINGEMENT SYNDROME OF SHOULDER REGION, UNSPECIFIED LATERALITY: Primary | ICD-10-CM

## 2017-12-06 LAB — POCT GLUCOSE: 106 MG/DL (ref 70–110)

## 2017-12-06 PROCEDURE — 36000710: Performed by: ORTHOPAEDIC SURGERY

## 2017-12-06 PROCEDURE — 71000015 HC POSTOP RECOV 1ST HR: Performed by: ORTHOPAEDIC SURGERY

## 2017-12-06 PROCEDURE — C1713 ANCHOR/SCREW BN/BN,TIS/BN: HCPCS | Performed by: ORTHOPAEDIC SURGERY

## 2017-12-06 PROCEDURE — 71000033 HC RECOVERY, INTIAL HOUR: Performed by: ORTHOPAEDIC SURGERY

## 2017-12-06 PROCEDURE — 27201423 OPTIME MED/SURG SUP & DEVICES STERILE SUPPLY: Performed by: ORTHOPAEDIC SURGERY

## 2017-12-06 PROCEDURE — 63600175 PHARM REV CODE 636 W HCPCS: Performed by: ORTHOPAEDIC SURGERY

## 2017-12-06 PROCEDURE — 63600175 PHARM REV CODE 636 W HCPCS: Performed by: NURSE ANESTHETIST, CERTIFIED REGISTERED

## 2017-12-06 PROCEDURE — 29824 SHO ARTHRS SRG DSTL CLAVICLC: CPT | Mod: AS,LT,, | Performed by: PHYSICIAN ASSISTANT

## 2017-12-06 PROCEDURE — 37000008 HC ANESTHESIA 1ST 15 MINUTES: Performed by: ORTHOPAEDIC SURGERY

## 2017-12-06 PROCEDURE — 01630 ANES OPN/ARTHR PX SHO JT NOS: CPT | Performed by: ORTHOPAEDIC SURGERY

## 2017-12-06 PROCEDURE — 25000003 PHARM REV CODE 250: Performed by: ANESTHESIOLOGY

## 2017-12-06 PROCEDURE — 25000003 PHARM REV CODE 250: Performed by: NURSE ANESTHETIST, CERTIFIED REGISTERED

## 2017-12-06 PROCEDURE — 29806 SHO ARTHRS SRG CAPSULORRAPHY: CPT | Mod: LT,,, | Performed by: ORTHOPAEDIC SURGERY

## 2017-12-06 PROCEDURE — 36000711: Performed by: ORTHOPAEDIC SURGERY

## 2017-12-06 PROCEDURE — 29826 SHO ARTHRS SRG DECOMPRESSION: CPT | Mod: LT,,, | Performed by: ORTHOPAEDIC SURGERY

## 2017-12-06 PROCEDURE — 29824 SHO ARTHRS SRG DSTL CLAVICLC: CPT | Mod: 51,LT,, | Performed by: ORTHOPAEDIC SURGERY

## 2017-12-06 PROCEDURE — 29826 SHO ARTHRS SRG DECOMPRESSION: CPT | Mod: AS,LT,, | Performed by: PHYSICIAN ASSISTANT

## 2017-12-06 PROCEDURE — 37000009 HC ANESTHESIA EA ADD 15 MINS: Performed by: ORTHOPAEDIC SURGERY

## 2017-12-06 DEVICE — ANCHOR GRYPHON W/ORTHOCORD: Type: IMPLANTABLE DEVICE | Site: SHOULDER | Status: FUNCTIONAL

## 2017-12-06 RX ORDER — ROPIVACAINE HYDROCHLORIDE 5 MG/ML
INJECTION, SOLUTION EPIDURAL; INFILTRATION; PERINEURAL
Status: DISCONTINUED
Start: 2017-12-06 | End: 2017-12-06 | Stop reason: HOSPADM

## 2017-12-06 RX ORDER — SODIUM CHLORIDE 0.9 % (FLUSH) 0.9 %
3 SYRINGE (ML) INJECTION
Status: DISCONTINUED | OUTPATIENT
Start: 2017-12-06 | End: 2017-12-06 | Stop reason: HOSPADM

## 2017-12-06 RX ORDER — MEPERIDINE HYDROCHLORIDE 50 MG/ML
12.5 INJECTION INTRAMUSCULAR; INTRAVENOUS; SUBCUTANEOUS ONCE AS NEEDED
Status: DISCONTINUED | OUTPATIENT
Start: 2017-12-06 | End: 2017-12-06 | Stop reason: HOSPADM

## 2017-12-06 RX ORDER — GLYCOPYRROLATE 0.2 MG/ML
INJECTION INTRAMUSCULAR; INTRAVENOUS
Status: DISCONTINUED | OUTPATIENT
Start: 2017-12-06 | End: 2017-12-06

## 2017-12-06 RX ORDER — NEOSTIGMINE METHYLSULFATE 1 MG/ML
INJECTION, SOLUTION INTRAVENOUS
Status: DISCONTINUED | OUTPATIENT
Start: 2017-12-06 | End: 2017-12-06

## 2017-12-06 RX ORDER — SODIUM CHLORIDE 9 MG/ML
INJECTION, SOLUTION INTRAVENOUS CONTINUOUS
Status: DISCONTINUED | OUTPATIENT
Start: 2017-12-06 | End: 2017-12-06 | Stop reason: HOSPADM

## 2017-12-06 RX ORDER — SODIUM CHLORIDE 0.9 % (FLUSH) 0.9 %
3 SYRINGE (ML) INJECTION EVERY 8 HOURS
Status: DISCONTINUED | OUTPATIENT
Start: 2017-12-06 | End: 2017-12-06 | Stop reason: HOSPADM

## 2017-12-06 RX ORDER — CEFAZOLIN SODIUM 2 G/50ML
2 SOLUTION INTRAVENOUS
Status: DISCONTINUED | OUTPATIENT
Start: 2017-12-06 | End: 2017-12-06 | Stop reason: HOSPADM

## 2017-12-06 RX ORDER — ACETAMINOPHEN 10 MG/ML
1000 INJECTION, SOLUTION INTRAVENOUS ONCE
Status: DISCONTINUED | OUTPATIENT
Start: 2017-12-06 | End: 2017-12-06 | Stop reason: HOSPADM

## 2017-12-06 RX ORDER — CHLORHEXIDINE GLUCONATE ORAL RINSE 1.2 MG/ML
10 SOLUTION DENTAL
Status: DISCONTINUED | OUTPATIENT
Start: 2017-12-06 | End: 2017-12-06 | Stop reason: HOSPADM

## 2017-12-06 RX ORDER — EPINEPHRINE 1 MG/ML
INJECTION, SOLUTION INTRACARDIAC; INTRAMUSCULAR; INTRAVENOUS; SUBCUTANEOUS
Status: DISCONTINUED | OUTPATIENT
Start: 2017-12-06 | End: 2017-12-06 | Stop reason: HOSPADM

## 2017-12-06 RX ORDER — PROPOFOL 10 MG/ML
VIAL (ML) INTRAVENOUS
Status: DISCONTINUED | OUTPATIENT
Start: 2017-12-06 | End: 2017-12-06

## 2017-12-06 RX ORDER — MIDAZOLAM HYDROCHLORIDE 1 MG/ML
INJECTION, SOLUTION INTRAMUSCULAR; INTRAVENOUS
Status: DISCONTINUED | OUTPATIENT
Start: 2017-12-06 | End: 2017-12-06

## 2017-12-06 RX ORDER — DEXAMETHASONE SODIUM PHOSPHATE 4 MG/ML
INJECTION, SOLUTION INTRA-ARTICULAR; INTRALESIONAL; INTRAMUSCULAR; INTRAVENOUS; SOFT TISSUE
Status: DISCONTINUED | OUTPATIENT
Start: 2017-12-06 | End: 2017-12-06

## 2017-12-06 RX ORDER — ROCURONIUM BROMIDE 10 MG/ML
INJECTION, SOLUTION INTRAVENOUS
Status: DISCONTINUED | OUTPATIENT
Start: 2017-12-06 | End: 2017-12-06

## 2017-12-06 RX ORDER — LIDOCAINE HCL/PF 100 MG/5ML
SYRINGE (ML) INTRAVENOUS
Status: DISCONTINUED | OUTPATIENT
Start: 2017-12-06 | End: 2017-12-06

## 2017-12-06 RX ORDER — HYDROCODONE BITARTRATE AND ACETAMINOPHEN 5; 325 MG/1; MG/1
1 TABLET ORAL EVERY 4 HOURS PRN
Status: DISCONTINUED | OUTPATIENT
Start: 2017-12-06 | End: 2017-12-06 | Stop reason: HOSPADM

## 2017-12-06 RX ORDER — LIDOCAINE HYDROCHLORIDE 10 MG/ML
1 INJECTION, SOLUTION EPIDURAL; INFILTRATION; INTRACAUDAL; PERINEURAL ONCE
Status: DISCONTINUED | OUTPATIENT
Start: 2017-12-06 | End: 2017-12-06 | Stop reason: HOSPADM

## 2017-12-06 RX ORDER — SUCCINYLCHOLINE CHLORIDE 20 MG/ML
INJECTION INTRAMUSCULAR; INTRAVENOUS
Status: DISCONTINUED | OUTPATIENT
Start: 2017-12-06 | End: 2017-12-06

## 2017-12-06 RX ORDER — HYDROMORPHONE HYDROCHLORIDE 1 MG/ML
0.2 INJECTION, SOLUTION INTRAMUSCULAR; INTRAVENOUS; SUBCUTANEOUS EVERY 5 MIN PRN
Status: DISCONTINUED | OUTPATIENT
Start: 2017-12-06 | End: 2017-12-06 | Stop reason: HOSPADM

## 2017-12-06 RX ORDER — SODIUM CHLORIDE, SODIUM LACTATE, POTASSIUM CHLORIDE, CALCIUM CHLORIDE 600; 310; 30; 20 MG/100ML; MG/100ML; MG/100ML; MG/100ML
INJECTION, SOLUTION INTRAVENOUS CONTINUOUS
Status: DISCONTINUED | OUTPATIENT
Start: 2017-12-06 | End: 2017-12-06 | Stop reason: HOSPADM

## 2017-12-06 RX ORDER — OXYCODONE AND ACETAMINOPHEN 10; 325 MG/1; MG/1
1 TABLET ORAL EVERY 4 HOURS PRN
Qty: 90 TABLET | Refills: 0 | Status: SHIPPED | OUTPATIENT
Start: 2017-12-06 | End: 2018-01-23

## 2017-12-06 RX ORDER — PHENYLEPHRINE HYDROCHLORIDE 10 MG/ML
INJECTION INTRAVENOUS
Status: DISCONTINUED | OUTPATIENT
Start: 2017-12-06 | End: 2017-12-06

## 2017-12-06 RX ADMIN — SUCCINYLCHOLINE CHLORIDE 100 MG: 20 INJECTION, SOLUTION INTRAMUSCULAR; INTRAVENOUS at 07:12

## 2017-12-06 RX ADMIN — MIDAZOLAM HYDROCHLORIDE 2 MG: 1 INJECTION, SOLUTION INTRAMUSCULAR; INTRAVENOUS at 06:12

## 2017-12-06 RX ADMIN — ROCURONIUM BROMIDE 25 MG: 10 INJECTION, SOLUTION INTRAVENOUS at 07:12

## 2017-12-06 RX ADMIN — LIDOCAINE HYDROCHLORIDE 60 MG: 20 INJECTION, SOLUTION INTRAVENOUS at 07:12

## 2017-12-06 RX ADMIN — ROCURONIUM BROMIDE 5 MG: 10 INJECTION, SOLUTION INTRAVENOUS at 07:12

## 2017-12-06 RX ADMIN — SODIUM CHLORIDE, SODIUM LACTATE, POTASSIUM CHLORIDE, AND CALCIUM CHLORIDE: 600; 310; 30; 20 INJECTION, SOLUTION INTRAVENOUS at 06:12

## 2017-12-06 RX ADMIN — CEFAZOLIN SODIUM 2 G: 2 SOLUTION INTRAVENOUS at 07:12

## 2017-12-06 RX ADMIN — PHENYLEPHRINE HYDROCHLORIDE 100 MCG: 10 INJECTION INTRAVENOUS at 07:12

## 2017-12-06 RX ADMIN — NEOSTIGMINE METHYLSULFATE 4 MG: 1 INJECTION INTRAVENOUS at 09:12

## 2017-12-06 RX ADMIN — PROPOFOL 120 MG: 10 INJECTION, EMULSION INTRAVENOUS at 07:12

## 2017-12-06 RX ADMIN — ROBINUL 0.6 MG: 0.2 INJECTION INTRAMUSCULAR; INTRAVENOUS at 09:12

## 2017-12-06 RX ADMIN — ROCURONIUM BROMIDE 10 MG: 10 INJECTION, SOLUTION INTRAVENOUS at 08:12

## 2017-12-06 RX ADMIN — PHENYLEPHRINE HYDROCHLORIDE 100 MCG: 10 INJECTION INTRAVENOUS at 08:12

## 2017-12-06 RX ADMIN — DEXAMETHASONE SODIUM PHOSPHATE 4 MG: 4 INJECTION, SOLUTION INTRA-ARTICULAR; INTRALESIONAL; INTRAMUSCULAR; INTRAVENOUS; SOFT TISSUE at 07:12

## 2017-12-06 NOTE — DISCHARGE INSTRUCTIONS
General Information:  1.  Do not drink alcoholic beverages including beer for 24 hours or as long as you are on pain medication..  2.  Do not drive a motor vehicle, operate machinery or power tools, or signs legal papers for 24 hours or as long as you are on pain medication.   3.  You may experience light-headedness, dizziness, and sleepiness following surgery. Please do not stay alone. A responsible adult should be with you for this 24 hour period.  4.  Go home and rest.  5. Progress slowly to a normal diet unless instructed.  Otherwise, begin with liquids such as soft drinks, then soup and crackers working up to solid foods. Drink plenty of nonalcoholic fluids.  6.  Certain anesthetics and pain medications produce nausea and vomiting in certain       individuals. If nausea becomes a problem at home, call you doctor.  7. A nurse will be calling you sometime after surgery. Do not be alarmed. This is our way of finding out how you are doing.  8. Several times every hour while you are awake, take 2-3 deep breaths and cough. If you had stomach surgery hold a pillow or rolled towel firmly against your stomach before you cough. This will help with any pain the cough might cause.  9. Several times every hour while you are awake, pump and flex your feet 5-6 times and do foot circles. This will help prevent blood clots.  10.Call your doctor for severe pain, bleeding, fever, or signs or symptoms of infection (pain, swelling, redness, foul odor, drainage).  11.You can contact your doctor anytime by [phone] for the [facility] (at [facility]) or [phone] for the [facility] on [address].   [email or web address] is another way to contact your doctor if you are an active participant online with [facility].

## 2017-12-06 NOTE — OP NOTE
OPERATIVE DICTATION:    DATE OF SERVICE:12/06/2017      Preoperative Diagnosis:  Left shoulder impingement syndrome    Postoperative Diagnosis:   Left shoulder impingement syndrome, Bankart lesion, midclavicular joint arthritis    Procedure: Left shoulder subacromial decompression, distal clavicle excision, extensive debridement                        Arthroscopic Bankart repair    Indication for surgery: Left shoulder pain    Anesthesia:  Gen. anesthesia    Complications:  None    Surgeon: John Orosco M.D.     Specimen: None    Assistant:VERONIQUE Matson. His assistance was critical and necesssary with positioning of the extremity throughout the surgical procedure.The physician assistant allowed me to access areas of the shoulder that could not be possible without the assistance of a trained orthopedic physician assistant.  His assistance was critical to allowing me to provide the highest level of care to the patient.      Operative procedure:  The patient was brought to the operating room after appropriate consent and placed under general anesthesia with intubation.  The patient placed in a beachchair position.  The left shoulder was prepped with alcohol, chlorhexidine and sterilely draped.  The patient did receive preoperative IV antibiotics prior to the incision being made.  The timeout was performed and the correct extremity identified.  The subacromial space was insufflated with 1 and 250,000 dilution of solution.  The cannula inserted and the camera inserted after the posterior lateral and anterior incisions made.  The patient noted to have thick and fibrotic subacromial, subdeltoid and subcoracoid bursitis.  The shaver and cautery inserted and the thorough bursectomy performed.  Patient noted to have an osteophyte over the anterior inferior aspect of the acromion.  The bur inserted and the acromioplasty performed.  The patient noted to have arthrosis of the acromioclavicular joint.  The bur inserted  and the distal clavicle excised.  Approximately 1 cm of the distal clavicle excision was excised.  The coracoacromial ligament left intact and did not appear to be impinging on the right rotator cuff following the acromioplasty and subacromial decompression.  The patient noted to have 2 cm of subacromial space following the decompression and acromioplasty.  The patient noted to have a superficial fibrillation of the anterior portion of the supraspinatus that was debrided using the shaver.  There is no visible rotator cuff tear within the subacromial space.  The posterior portal used to enter the glenohumeral joint.  The patient noted to have a minimal amount of inflamed synovium in the glenohumeral joint.  Patient noted to have a Bankart lesion beginning at the 7:00 position extending into the 11:00 position.  The patient noted to have a partial tear of the superior labrum.  The patient did have a small flap tear involving the supraspinatus that accounted for approximately 1% of the thickness of the supraspinatus.  The shaver inserted and the loose fibers of the anterior labrum as well as the superior labrum and the supraspinatus were debrided.  The curette inserted and bleeding bone exposed from the 7:00 position to the 11:30 position.  The shaver used to remove all debris.  The drill guide placed at the 8:30 position and an anchor placed.  The shoulder abductor and internally rotated and the Bankart lesion repaired.  The repair noted to be quite stable.  A drill guide placed at the 11:00 position and the drill hole made.  The anchor placed and the 18-gauge spinal needle advanced through the superior labrum.  The suture placed around the superior labrum.  The [place] then advanced through the anterior superior labrum the suture advanced and the labrum repair was reinforced at the 11:30 position.  The suture placed through the superior labrum was then used to stabilize the superior labrum labrum.  The repair noted  to be quite stable.  Upon repair of the Bankart lesion the patient noted to have significant tightening of the anterior superior glenohumeral ligament with external rotation and a significant amount of resistance to anterior translation with external rotation.  The switching stick was then placed through the anterior portal and advanced through the posterior portal.  The camera sheath was then placed anteriorly and the posterior labrum evaluated and noted to be intact.  The patient did have a minimal amount of laxity over the posterior capsule without evidence of a capsular tear.  A light thermal tightening of the posterior capsule was performed.  All debris removed using the shaver.  The fluid exsanguinated from the glenohumeral joint and the subacromial space.  The arthroscopy portal incisions were repaired using interrupted 3-0 nylon sutures.  Betadine applied to incision site.  Sterile gauze applied and the sponge tape applied.  The patient was immobilized in a shoulder immobilizer.   The patient tolerated the procedure well.                 John Orosco M.D.

## 2017-12-06 NOTE — ANESTHESIA PREPROCEDURE EVALUATION
12/06/2017  Rosalva Stauffer is a 52 y.o., female.    Anesthesia Evaluation    I have reviewed the Patient Summary Reports.    I have reviewed the Nursing Notes.      Review of Systems  Social:  Non-Smoker, No Alcohol Use    Hematology/Oncology:  Hematology Normal   Oncology Normal     EENT/Dental:EENT/Dental Normal   Cardiovascular:   Hypertension hyperlipidemia    Pulmonary:  Pulmonary Normal    Renal/:  Renal/ Normal     Hepatic/GI:  Hepatic/GI Normal    Musculoskeletal:   Arthritis  DDD   OB/GYN/PEDS:  Polymenorrhea   Leiomyoma hx.   Neurological:   Parkinsons disease - on azilect and mirapex.   Dermatological:  Skin Normal    Psych:  Psychiatric Normal           Physical Exam  General:  Well nourished    Airway/Jaw/Neck:  Airway Findings: Tongue: Normal General Airway Assessment: Adult  Mallampati: II  Improves to I with phonation.  TM Distance: Normal, at least 6 cm  Jaw/Neck Findings:  Neck ROM: Normal ROM       Chest/Lungs:  Chest/Lungs Findings: Clear to auscultation, Normal Respiratory Rate     Heart/Vascular:  Heart Findings: Rate: Normal  Rhythm: Regular Rhythm  Sounds: Normal        Mental Status:  Mental Status Findings:  Cooperative, Alert and Oriented         Anesthesia Plan  Type of Anesthesia, risks & benefits discussed:  Anesthesia Type:  general  Patient's Preference:   Intra-op Monitoring Plan: standard ASA monitors  Intra-op Monitoring Plan Comments:   Post Op Pain Control Plan: IV/PO Opioids PRN and peripheral nerve block  Post Op Pain Control Plan Comments:   Induction:   IV  Beta Blocker:  Patient is not currently on a Beta-Blocker (No further documentation required).       Informed Consent: Patient understands risks and agrees with Anesthesia plan.  Questions answered. Anesthesia consent signed with patient.  ASA Score: 3     Day of Surgery Review of History & Physical: I  have interviewed and examined the patient. I have reviewed the patient's H&P dated:  There are no significant changes.          Ready For Surgery From Anesthesia Perspective.

## 2017-12-06 NOTE — ANESTHESIA PROCEDURE NOTES
Peripheral    Patient location during procedure: pre-op   Block not for primary anesthetic.  Reason for block: at surgeon's request   Post-op Pain Location: left shoulder scope  Start time: 12/6/2017 6:36 AM  Timeout: 12/6/2017 6:35 AM   End time: 12/6/2017 6:41 AM  Surgery related to: left shoulder  Staffing  Anesthesiologist: ANICETO MIKE  Performed: anesthesiologist   Preanesthetic Checklist  Completed: patient identified, surgical consent, pre-op evaluation, timeout performed, IV checked, risks and benefits discussed and monitors and equipment checked  Peripheral Block  Patient position: supine  Prep: ChloraPrep  Patient monitoring: heart rate, cardiac monitor, continuous pulse ox and frequent blood pressure checks  Block type: brachial plexus and interscalene  Laterality: left  Injection technique: single shot  Needle  Needle type: Stimuplex   Needle gauge: 22 G  Needle length: 2 in  Needle localization: ultrasound guidance  Needle insertion depth: 2 cm     Assessment  Injection assessment: negative aspiration, negative parasthesia and local visualized surrounding nerve  Paresthesia pain: none  Heart rate change: no  Slow fractionated injection: yes  Medications:  Bolus administered: 28 mL of 0.5 ropivacaine  Epinephrine added: none

## 2017-12-06 NOTE — H&P
CC: 52-year-old female left shoulder pain     HPI: 2 month history of of left shoulder pain.  She is working out doing her exercises and felt a pop in her left shoulder. Has not worked out since that time.  Taken some NSAIDs with minimal relief.  Pain increases with any activity especially overhead activity.  She has altered her ADLS due to pain.     PMH:         Past Medical History:   Diagnosis Date    DDD (degenerative disc disease), cervical      Hyperlipidemia      Hypertension      Leiomyoma      Parkinson's disease      Polymenorrhea      Seasonal allergies           PSH:    Past Surgical History:   Procedure Laterality Date     SECTION, LOW TRANSVERSE        GANGLION CYST EXCISION        HERNIA REPAIR        Lysis of adhesions and enterotomy closure   2013    ROBOTIC ASSISTED HYSTERECTOMY   2013    Submaxillary gland drainage             Family Hx:          Family History   Problem Relation Age of Onset    Hypertension Mother      Cancer Mother      Diabetes Father      Hypertension Father      Hypertension Sister      Thyroid disease Sister      Hypertension Brother      Hypertension Sister      Hypertension Sister      Hypertension Brother      Cataracts Maternal Grandmother           Allergy:    Review of patient's allergies indicates:   Allergen Reactions    Allegra-d 12 hour [fexofenadine-pseudoephedrine] Palpitations    Celestone [betamethasone sodium phosphate] Palpitations         Medication:    Current Outpatient Prescriptions:     fluticasone (FLONASE) 50 mcg/actuation nasal spray, SPRAY TWICE IN EACH NOSTRIL EVERY DAY, Disp: 16 Bottle, Rfl: 0    hydrochlorothiazide (MICROZIDE) 12.5 mg capsule, Take 1 capsule (12.5 mg total) by mouth once daily., Disp: 90 capsule, Rfl: 3    multivitamin capsule, Take 1 capsule by mouth once daily., Disp: , Rfl:     rasagiline (AZILECT) 1 mg Tab, Take 1 mg by mouth once daily., Disp: , Rfl:      Social History:   "  Social History   Social History            Social History    Marital status: Single       Spouse name: N/A    Number of children: N/A    Years of education: N/A           Occupational History      [employer]           Social History Main Topics    Smoking status: Never Smoker    Smokeless tobacco: Never Used    Alcohol use No    Drug use: No    Sexual activity: Not Currently           Other Topics Concern    Not on file          Social History Narrative     The patient is  and has one child. 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                       Vitals:   /81   Pulse 75   Resp 16   Ht 5' 6" (1.676 m)   Wt 63.4 kg (139 lb 12.4 oz)   LMP 05/28/2013   BMI 22.56 kg/m²       ROS:  GENERAL: No fever, chills, fatigability or weight loss.  SKIN: No rashes, itching or changes in color or texture of skin.  HEAD: No headaches or recent head trauma.  EYES: Visual acuity fine. No photophobia, ocular pain or diplopia.  EARS: Denies ear pain, discharge or vertigo.  NOSE: No loss of smell, no epistaxis or postnasal drip.  MOUTH & THROAT: No hoarseness or change in voice. No excessive gum bleeding.  NODES: Denies swollen glands.  CHEST: Denies NASH, cyanosis, wheezing, cough and sputum production.  CARDIOVASCULAR: Denies chest pain, PND, orthopnea or reduced exercise tolerance.  ABDOMEN: Appetite fine. No weight loss. Denies diarrhea, abdominal pain, hematemesis or blood in stool.  URINARY: No flank pain, dysuria or hematuria.  PERIPHERAL VASCULAR: No claudication or cyanosis.  NEUROLOGIC: No history of seizures, paralysis, alteration of gait or coordination.  MUSCULOSKELETAL: See HPI     PE:  APPEARANCE: Well nourished, well developed, in no acute distress.   HEAD: Normocephalic, atraumatic.  NEUROLOGIC: Cranial Nerves: II-XII grossly intact, also see MUSCULOSKELETAL  MUSCULOSKELETAL: left Shoulder Exam     Muscle Appearance:Normal  Grooming:Normal  Spine Alignment-Normal  Muscle " Atrophy-No  Deformities-No  Tenderness-Yes  Paresthesias-No  Range of Motion-decreased         Abd Pure-decreased         Abd Combined-decreased         Ext-decreased         Flex-decreased         Internal Rot-decreased         External Rot-decreased  Muscle Strength-decreased  Sensation-normal  Reflexes-Normal  Crepitus-Yes  Impingement-Yes  Apprehension-Yes  Fatigue-Yes  Scapular Winging-No  Muscle Tightness-Yes  Instability-No  Tests on Exam-no evidence of instability  Neurovascular Status-Normal  Skin-Normal  Positive Drop Arm testYes     Assessment:            Diagnosis:              1.  Left shoulder impingement syndrome               2.  Left shoulder rotator cuff tear     Diagnostic Studies  MRI-Yes agree with findings ofOsteoarthritic change at the a.c. joint with slight inferior lateral positioning type III acromion.    2.  Supra-and infraspinatus tendinitis with probable partial-thickness tears of the articular surface without a full-thickness tear noted.    3.  Subscapularis tendinitis without tear.    4.  Trace fluid noted in the subacromial and subdeltoid bursa as well as extending within the biceps tendon sheath.  X-Ray-No        Plan:                                                  1. PT-no                                                 2.OT-no                                          3.NSAID-no                                        4. Narcotics-no                                     5. Wound care-N/A                                 6. Rest-no                                           7. Surgery-yes the patient will benefit from a left shoulder ATS                                      8. MAYI Hose-no                                    9. Anticoagulation therapy-no               10. Elevation-no                                     11. Crutches-no                                    12. Walker-no             13. Cane no                        14. Referral-no                                      15.Injection-no                            16. Splint   /    Cast   /   Cast Shoe-No              17. RICE            18. Follow up-  all the patient's questions and concerns were answered by Dr Orosco and myself.  She elects to proceed with a left shoulder ATS to decrease pain, increase function, and improved quality of life.

## 2017-12-06 NOTE — DISCHARGE SUMMARY
[facility] -   Brief Operative Note     SUMMARY     Surgery Date: 12/6/2017     Surgeon(s) and Role:     * John Orosco Sr., MD - Primary    Assisting Surgeon: None    Pre-op Diagnosis:  Tear of left rotator cuff, unspecified tear extent [M75.102]    Post-op Diagnosis:  Post-Op Diagnosis Codes:  Left shoulder impingement syndrome, Bankart lesion, midclavicular joint arthritis    Procedure(s) (LRB):  ARTHROSCOPY-SHOULDER; left shoulder (Left)  DEBRIDEMENT-SHOULDER-ARTHROSCOPIC (Left)  ARTHROSCOPY-SHOULDER WITH SUBACROMIAL DECOMPRESSION (Left)  ARTHROSCOPY-SHOULDER EXCISION DISTAL CLAVICLE (Left)  ARTHROSCOPY SHOULDER, BANKHART REPAIR (LABRAL REPAIR) (Left)    Anesthesia: General    Description of the findings of the procedure:  Left shoulder impingement syndrome, Bankart lesion, midclavicular joint arthritis    Findings/Key Components:  Left shoulder impingement syndrome, Bankart lesion, midclavicular joint arthritis    Estimated Blood Loss:  15 cc         Specimens:   Specimen (12h ago through future)    None          Discharge Note    SUMMARY     Admit Date: 12/6/2017    Discharge Date and Time:  12/06/2017 9:44 AM    Hospital Course (synopsis of major diagnoses, care, treatment, and services provided during the course of the hospital stay): Without The patient.     Final Diagnosis: Post-Op Diagnosis Codes:     Left shoulder impingement syndrome, Bankart lesion, midclavicular joint arthritis    Disposition: Home or Self Care    Follow Up/Patient Instructions: Follow-up in 13 days, resume regular diet, daily dressing changes with Betadine gauze and sponge tape.    Medications: Percocet  Reconciled Home Medications:   Current Discharge Medication List      START taking these medications    Details   oxyCODONE-acetaminophen (PERCOCET)  mg per tablet Take 1 tablet by mouth every 4 (four) hours as needed.  Qty: 90 tablet, Refills: 0         CONTINUE these medications which have NOT CHANGED     Details   hydroCHLOROthiazide (MICROZIDE) 12.5 mg capsule Take 1 capsule (12.5 mg total) by mouth once daily.  Qty: 90 capsule, Refills: 3      multivitamin capsule Take 1 capsule by mouth once daily.      pramipexole (MIRAPEX) 0.25 MG tablet Take 0.25 mg by mouth 2 (two) times daily.      rasagiline (AZILECT) 1 mg Tab Take 1 mg by mouth once daily.      amoxicillin-clavulanate 875-125mg (AUGMENTIN) 875-125 mg per tablet Take 1 tablet by mouth every 12 (twelve) hours.             Discharge Procedure Orders  Referral to Occupational therapy   Referral Priority: Routine Referral Type: Occupational Therapy   Referral Reason: Specialty Services Required    Requested Specialty: Occupational Therapy    Number of Visits Requested: 1      Diet general     Call MD for:  temperature >100.4     Call MD for:  persistent nausea and vomiting     Call MD for:  severe uncontrolled pain     Call MD for:  difficulty breathing, headache or visual disturbances     Call MD for:  redness, tenderness, or signs of infection (pain, swelling, redness, odor or green/yellow discharge around incision site)     Call MD for:  hives     Call MD for:  persistent dizziness or light-headedness     Call MD for:  extreme fatigue     Other restrictions (specify):   Order Comments: Continue shoulder immobilization.  Avoid abduction greater than 45°, avoid forward flexion greater than 45°, avoid all external rotation.  Keep the axilla clean and dry daily.     Remove dressing in 48 hours   Order Comments: Leave dressing on for 48 hours.  After 48 hours, remove dressings and apply betadine, gauze and sponge tape.       Follow-up Information     Call John Orosco Sr, MD.    Specialty:  Orthopedic Surgery  Why:  As needed, If symptoms worsen, For suture removal, For wound re-check  Contact information:  [address]  [place] [id number] [phone]

## 2017-12-06 NOTE — TRANSFER OF CARE
"Anesthesia Transfer of Care Note    Patient: Rosalva Stauffer    Procedure(s) Performed: Procedure(s) (LRB):  ARTHROSCOPY-SHOULDER; left shoulder (Left)  DEBRIDEMENT-SHOULDER-ARTHROSCOPIC (Left)  ARTHROSCOPY-SHOULDER WITH SUBACROMIAL DECOMPRESSION (Left)  ARTHROSCOPY-SHOULDER EXCISION DISTAL CLAVICLE (Left)  ARTHROSCOPY SHOULDER, BANKHART REPAIR (LABRAL REPAIR) (Left)    Patient location: PACU    Anesthesia Type: general    Transport from OR: Transported from OR on room air with adequate spontaneous ventilation    Post pain: adequate analgesia    Post assessment: no apparent anesthetic complications    Post vital signs: stable    Level of consciousness: responds to stimulation    Nausea/Vomiting: no nausea/vomiting    Complications: none    Transfer of care protocol was followed      Last vitals:   Visit Vitals  /71 (BP Location: Right arm, Patient Position: Sitting)   Pulse 70   Temp 36.6 °C (97.9 °F) (Tympanic)   Resp 18   Ht 5' 6" (1.676 m)   Wt 61.6 kg (135 lb 12.9 oz)   LMP 05/28/2013   SpO2 99%   Breastfeeding? No   BMI 21.92 kg/m²     "

## 2017-12-06 NOTE — TELEPHONE ENCOUNTER
----- Message from Kianna Holman sent at 12/6/2017 12:24 PM CST -----  Contact: pt  States she had surgery this morning and she has a question regarding this big cushion on her arm. States its not an emergency. Please call pt at [phone]. Thank you

## 2017-12-06 NOTE — INTERVAL H&P NOTE
The patient has been examined and the H&P has been reviewed:    I concur with the findings and no changes have occurred since H&P was written.    Anesthesia/Surgery risks, benefits and alternative options discussed and understood by patient/family.          Active Hospital Problems    Diagnosis  POA    Impingement syndrome of left shoulder [M75.42]  Yes      Resolved Hospital Problems    Diagnosis Date Resolved POA   No resolved problems to display.

## 2017-12-06 NOTE — PROGRESS NOTES
The patient is in pre-op.  I have signed off on her Left shoulder, the consent is signed.  The H and P is updated and the orders have been placed. I am in room # 6. The patient is in the preop area. The patient has no further questions regarding her planned surgery, possible complications, nor anything else related to her care. I have reviewed the instruments with the Scrub tech. They understand that I may ask for instruments that are not open and they are aware of where to find the instruments.     John Orosco M.D.

## 2017-12-06 NOTE — ANESTHESIA RELEASE NOTE
"Anesthesia Release from PACU Note    Patient: Rosavla Stauffer    Procedure(s) Performed: Procedure(s) (LRB):  ARTHROSCOPY-SHOULDER; left shoulder (Left)  DEBRIDEMENT-SHOULDER-ARTHROSCOPIC (Left)  ARTHROSCOPY-SHOULDER WITH SUBACROMIAL DECOMPRESSION (Left)  ARTHROSCOPY-SHOULDER EXCISION DISTAL CLAVICLE (Left)  ARTHROSCOPY SHOULDER, BANKHART REPAIR (LABRAL REPAIR) (Left)    Anesthesia type: general    Post pain: Adequate analgesia    Post assessment: no apparent anesthetic complications, tolerated procedure well and no evidence of recall    Last Vitals:   Visit Vitals  /71 (BP Location: Right arm, Patient Position: Sitting)   Pulse 70   Temp 36.6 °C (97.9 °F) (Tympanic)   Resp 18   Ht 5' 6" (1.676 m)   Wt 61.6 kg (135 lb 12.9 oz)   LMP 05/28/2013   SpO2 99%   Breastfeeding? No   BMI 21.92 kg/m²       Post vital signs: stable    Level of consciousness: responds to stimulation    Nausea/Vomiting: no nausea/no vomiting    Complications: none    Airway Patency: patent    Respiratory: unassisted    Cardiovascular: stable and blood pressure at baseline    Hydration: euvolemic     "

## 2017-12-06 NOTE — PROGRESS NOTES
The patient has been examined and the H&P has been reviewed:     Patient states that no changes have occurred since H&P was written. She elects to proceed with a Left shoulder arthroscope.      Anesthesia/Surgery risks, benefits and alternative options discussed and understood by patient/family.Patient has followed all pre-op instructions. All questions have been answered.            There are no hospital problems to display for this patient.

## 2017-12-07 ENCOUNTER — TELEPHONE (OUTPATIENT)
Dept: ORTHOPEDICS | Facility: CLINIC | Age: 52
End: 2017-12-07

## 2017-12-07 NOTE — TELEPHONE ENCOUNTER
----- Message from Nick Palacio sent at 12/7/2017 11:08 AM CST -----  Contact: Pt   States she's calling rg paperwork that are to be fax[place] to her job. [place] forms and can be reached at [phone]//thanks/dbw

## 2017-12-12 ENCOUNTER — CLINICAL SUPPORT (OUTPATIENT)
Dept: REHABILITATION | Facility: HOSPITAL | Age: 52
End: 2017-12-12
Attending: ORTHOPAEDIC SURGERY
Payer: COMMERCIAL

## 2017-12-12 ENCOUNTER — TELEPHONE (OUTPATIENT)
Dept: ORTHOPEDICS | Facility: CLINIC | Age: 52
End: 2017-12-12

## 2017-12-12 DIAGNOSIS — M75.42 IMPINGEMENT SYNDROME OF LEFT SHOULDER: Primary | ICD-10-CM

## 2017-12-12 PROCEDURE — 97165 OT EVAL LOW COMPLEX 30 MIN: CPT

## 2017-12-12 PROCEDURE — 97110 THERAPEUTIC EXERCISES: CPT

## 2017-12-12 NOTE — PROGRESS NOTES
"Occupational Therapy Shoulder Evaluation      Patient:  Rosalva Stauffer  Date of Evaluation: 12/12/2017  MRN: [id number]  Referring Physician:  John Orosco Sr., MD  Diagnosis: L Bankart repair, subacromial decompression, and distal clavicle excision  Referral Orders:   Eval and treat     Start Time: 8:00 am  End Time: 8:45 am  Total Time: 45 min           Occupation:  , all clerical work  Working presently:  No x 6 weeks due to sx  Past Medical History/ Physical Systems Review:   Past Medical History:   Diagnosis Date    DDD (degenerative disc disease), cervical     Hyperlipidemia     Hypertension     Leiomyoma     Parkinson's disease     Polymenorrhea     Prediabetes     Seasonal allergies      Medications: Rosalva Stauffer has a current medication list which includes the following prescription(s): amoxicillin-clavulanate 875-125mg, hydrochlorothiazide, multivitamin, oxycodone-acetaminophen, pramipexole, and rasagiline.    Date of onset: 6-7 months  Mechanism of Injury:   Gradual onset of pain that increased to difficulty with ROM.     Visit 1 of 20; Expires 12/31/2017    Hand dominance: R  DOS: 12/6/17  S/P: 6 days    Subjective: Pt reports minimal pain and has not needed pain meds.    Pain:  During no work/ at rest  0 out of 10  4/10 at worst over past 3 days  Location of Pain:  L shoulder    Patient's goals for therapy are: "get my arm back to normal    Objective:  Observation/Inspection:  Presents with abduction sling properly applied.     Range of Motion:    Passive Shoulder Flexion approx 25 degrees  Passive Shoulder ER to 0    Strength: Not tested due to post operative status    Functional Limitations and Goal:   Self Care: Limited use of LUE for ADLs, bathing, dressing, overhead ADLS  Work/Activity : Limited use of LUE for driving, work tasks, laundry, housework,   Leisure:  Limited use of LUE for gym workouts    Quick DASH score: 63%     Treatment included :  " OT evaluation and instruction in written HEP including Codman's to 45, AROM elbow, forearm, wrist, and hand   Modalities: none today  Manual Therapy: (0 min) none today  There Ex: (22 min)    -Passive shoulder flexion    -A/AROM elbow ext/flex in supine 10x   -AROM Forearm rotation 10x   -AROM Wrist E/F 10x   -Active fist formation 10x    -AROM Cervica E/F, Sidebending, and lateral rotation, 10x ea  Education: Surgical precautions    Patient demonstrated good understanding of HEP/modality use for pain management.     Assessment:   Rosalva Stauffer was  referred to Outpatient Occupational Therapy 6 days s/p L Bankart repair, Subacromial decompression, and distal clavicle excision. She presents with limitations as described in problem list below. These impairments are limiting the patient's ability to perform ADLs, work, and leisure tasks without limitations.   Patient will benefit from Occupational Therapy intervention to address these limitations to improve overall functional use of the L upper extremity.    Co-morbidities which may impact the plan of care and potentially impede the patient's progress in therapy include:Parkinson's  Patients CLINICAL PRESENTATION is STABLE.     Problem List:    Healing Incision  Post operative edema  Decreased ROM,  Decreased strength,  Decreased functional use,  Increased pain     Short Term Goals:  ( 4 weeks)   1) Patient will be independent with HEP and modalities for pain management.  2) Pt will exhibit increased passive flexion to 45 degrees.    3 Pt will report decreased pain to 2-3  out of 10 at worst.  4) Pt will exhibit a Quick DASH score of 40-50%, evidencing improving functional use of L Upper extremity.       Long Term Goals (12-16 weeks)  1) Patient will be return to PLOF, including work and light gym workouts.  2) Pt will exhibit increased AROM  At 140-160 degrees  For flexion, abduction and WFL ER/IR to enable Reaching overhead shelves, washing hair/back,  donning belt.   3) Pt will exhibit 4/5 MMT for shoulder muscles to enable placing objects in overhead cabinets.   4) Pt will report decreased pain to 2 out of 10 with ADLs.  5) Pt will exhibit a Quick DASH score between  1-19 %, indicative of significant improvement in functional abilities.        Plan:  Patient will benefit form skilled Occupational Therapy 1  times per week for 4  weeks , To address the above deficits and achieve the established goals.    Treatment to include Modalities for pain management (moist heat, cold packs, Ultrasound, iontophoresis with Dexamethasone, electrical stimulation),  Manual therapy/ Joint Mobilizations,Therapeutic exercises / activities, pt education, HEP,  as well as any other treatments deemed necessary in order to maximize painfree functional use of  L  UE.             I certify the need for these services furnished under this plan of treatment and while under my care.  GRAEME Puentes, T      ____________________________________                         __________________  Physician/Referring Practitioner                                               Date of Signature

## 2017-12-12 NOTE — TELEPHONE ENCOUNTER
"Contacted pt for post op well check.  Pt stated "Im doing great and I started therapy today". Pt denied any post op  complaints.  Reminded pt of post operative eval for suture removal next week.  Pt verbalized understanding.   "

## 2017-12-18 VITALS
HEIGHT: 66 IN | RESPIRATION RATE: 14 BRPM | WEIGHT: 135.81 LBS | HEART RATE: 75 BPM | DIASTOLIC BLOOD PRESSURE: 76 MMHG | SYSTOLIC BLOOD PRESSURE: 112 MMHG | BODY MASS INDEX: 21.83 KG/M2 | OXYGEN SATURATION: 100 % | TEMPERATURE: 98 F

## 2017-12-19 ENCOUNTER — CLINICAL SUPPORT (OUTPATIENT)
Dept: REHABILITATION | Facility: HOSPITAL | Age: 52
End: 2017-12-19
Attending: ORTHOPAEDIC SURGERY
Payer: COMMERCIAL

## 2017-12-19 DIAGNOSIS — M75.42 IMPINGEMENT SYNDROME OF LEFT SHOULDER: Primary | ICD-10-CM

## 2017-12-19 PROCEDURE — 97110 THERAPEUTIC EXERCISES: CPT

## 2017-12-19 NOTE — PROGRESS NOTES
Occupational Therapy Shoulder Daily Treatment Note      Patient:  Rosalva Stauffer  Date of Evaluation: 12/19/2017  MRN: [id number]  Referring Physician:  John Orosco Sr., MD  Diagnosis: L Bankart repair, subacromial decompression, and distal clavicle excision  Referral Orders:   Eval and treat     Start Time: 8:45 am  End Time: 9:25 am  Total Time: 40 min           Occupation:  , all clerical work  Working presently:  No x 6 weeks due to sx    Date of onset: 6-7 months  Mechanism of Injury:   Gradual onset of pain that increased to difficulty with ROM.     Visit 2 of 20; Expires 12/31/2017    Hand dominance: R  DOS: 12/6/17  S/P: 6 days    Subjective: Pt continues to report minimal to no pain. Reports soreness over posterior delt.       Objective:  Range of Motion:    Passive Shoulder Flexion approx 35 degrees (+10)  Passive Shoulder ER to 0    Functional Limitations and Goal:   Self Care: Limited use of LUE for ADLs, bathing, dressing, overhead ADLS  Work/Activity : Limited use of LUE for driving, work tasks, laundry, housework,   Leisure:  Limited use of LUE for gym workouts       Treatment included :  Reviewed HEP to ensure proper performance. Instructed pt to occasionally allow elbow to stretch into extension at home to reduce stiffness.   Modalities: Moist Heat applied to L shoulder x 10 min to decrease pain and  increase blood flow and tissue elasticity prior to treatment.   Manual Therapy: (3 min) STM to posterior delt  There Ex: (22 min)    -Passive shoulder flexion to approx 30-35   -Passive ER to 0   -A/AROM elbow ext/flex in 3 FA positions in supine 10x   -AROM Forearm rotation 10x   -AROM Wrist E/F 10x   -Active fist formation 10x    -AROM Cervica E/F, Sidebending, and lateral rotation, 10x ea   -Codman's to 45    Assessment: Small gains in passive flexion. Developing tightness in elbow extension, modified HEP slightly to allow pt to do gravity assisted stretch to elbow a  few times a day to prevent a flexion contracture. Pt to maintain shoulder precautions while doing so.       Problem List:    Healing Incision  Post operative edema  Decreased ROM,  Decreased strength,  Decreased functional use,  Increased pain     Short Term Goals:  ( 4 weeks)   1) Patient will be independent with HEP and modalities for pain management. (met)  2) Pt will exhibit increased passive flexion to 45 degrees.  (partially met)  3 Pt will report decreased pain to 2-3  out of 10 at worst.  4) Pt will exhibit a Quick DASH score of 40-50%, evidencing improving functional use of L Upper extremity.       Long Term Goals (12-16 weeks)  1) Patient will be return to PLOF, including work and light gym workouts.  2) Pt will exhibit increased AROM  At 140-160 degrees  For flexion, abduction and WFL ER/IR to enable Reaching overhead shelves, washing hair/back, donning belt.   3) Pt will exhibit 4/5 MMT for shoulder muscles to enable placing objects in overhead cabinets.   4) Pt will report decreased pain to 2 out of 10 with ADLs.  5) Pt will exhibit a Quick DASH score between  1-19 %, indicative of significant improvement in functional abilities.        Plan:  Continue skilled Occupational Therapy 1  times per week for 4  weeks then increase to 2x/week x 12 weeks to address the above deficits and achieve the established goals.  Pt going on vacation x 1 week.She will return to therapy the following week.    Treatment to include Modalities for pain management (moist heat, cold packs, Ultrasound, iontophoresis with Dexamethasone, electrical stimulation),  Manual therapy/ Joint Mobilizations,Therapeutic exercises / activities, pt education, HEP,  as well as any other treatments deemed necessary in order to maximize painfree functional use of  L  UE.             I certify the need for these services furnished under this plan of treatment and while under my care.  GRAEME Puentes,  CHT      ____________________________________                         __________________  Physician/Referring Practitioner                                               Date of Signature

## 2017-12-21 ENCOUNTER — OFFICE VISIT (OUTPATIENT)
Dept: ORTHOPEDICS | Facility: CLINIC | Age: 52
End: 2017-12-21
Payer: COMMERCIAL

## 2017-12-21 VITALS
DIASTOLIC BLOOD PRESSURE: 76 MMHG | SYSTOLIC BLOOD PRESSURE: 126 MMHG | HEART RATE: 87 BPM | HEIGHT: 66 IN | BODY MASS INDEX: 22.85 KG/M2 | WEIGHT: 142.19 LBS

## 2017-12-21 DIAGNOSIS — Z98.890 POST-OPERATIVE STATE: Primary | ICD-10-CM

## 2017-12-21 PROCEDURE — 99999 PR PBB SHADOW E&M-EST. PATIENT-LVL III: CPT | Mod: PBBFAC,,, | Performed by: ORTHOPAEDIC SURGERY

## 2017-12-21 PROCEDURE — 99024 POSTOP FOLLOW-UP VISIT: CPT | Mod: S$GLB,,, | Performed by: ORTHOPAEDIC SURGERY

## 2017-12-22 PROBLEM — Z98.890 POST-OPERATIVE STATE: Status: ACTIVE | Noted: 2017-12-22

## 2017-12-22 NOTE — PROGRESS NOTES
"SUBJECTIVE:  Patient is status post Left shoulder ATS .  Patient complains of   1/ 10 pain that is better with the  pain meds and aggravated with movement.      Past Medical History:   Diagnosis Date    DDD (degenerative disc disease), cervical     Hyperlipidemia     Hypertension     Leiomyoma     Parkinson's disease     Polymenorrhea     Prediabetes     Seasonal allergies      Past Surgical History:   Procedure Laterality Date     SECTION, LOW TRANSVERSE      x1    GANGLION CYST EXCISION      HERNIA REPAIR      HYSTERECTOMY      Lysis of adhesions and enterotomy closure  2013    ROBOTIC ASSISTED HYSTERECTOMY  2013    With unilateral SO    Submaxillary gland drainage       Review of patient's allergies indicates:   Allergen Reactions    Allegra [fexofenadine] Palpitations    Allegra-d 12 hour [fexofenadine-pseudoephedrine] Palpitations     Current Outpatient Prescriptions on File Prior to Visit   Medication Sig Dispense Refill    hydroCHLOROthiazide (MICROZIDE) 12.5 mg capsule Take 1 capsule (12.5 mg total) by mouth once daily. 90 capsule 3    multivitamin capsule Take 1 capsule by mouth once daily.      pramipexole (MIRAPEX) 0.25 MG tablet Take 0.25 mg by mouth 2 (two) times daily.      rasagiline (AZILECT) 1 mg Tab Take 1 mg by mouth once daily.      amoxicillin-clavulanate 875-125mg (AUGMENTIN) 875-125 mg per tablet Take 1 tablet by mouth every 12 (twelve) hours.      oxyCODONE-acetaminophen (PERCOCET)  mg per tablet Take 1 tablet by mouth every 4 (four) hours as needed. 90 tablet 0     No current facility-administered medications on file prior to visit.      /76   Pulse 87   Ht 5' 6" (1.676 m)   Wt 64.5 kg (142 lb 3.2 oz)   LMP 2013   BMI 22.95 kg/m²    ROS:  GENERAL: No fever, chills, fatigability or weight loss.  SKIN: No rashes, itching or changes in color or texture of skin.  HEAD: No headaches or recent head trauma.  EYES: Visual acuity " fine. No photophobia, ocular pain or diplopia.  EARS: Denies ear pain, discharge or vertigo.  NOSE: No loss of smell, no epistaxis or postnasal drip.  MOUTH & THROAT: No hoarseness or change in voice. No excessive gum bleeding.  NODES: Denies swollen glands.  CHEST: Denies NASH, cyanosis, wheezing, cough and sputum production.  CARDIOVASCULAR: Denies chest pain, PND, orthopnea or reduced exercise tolerance.  ABDOMEN: Appetite fine. No weight loss. Denies diarrhea, abdominal pain, hematemesis or blood in stool.  URINARY: No flank pain, dysuria or hematuria.  PERIPHERAL VASCULAR: No claudication or cyanosis.  NEUROLOGIC: No history of seizures, paralysis, alteration of gait or coordination.  MUSCULOSKELETAL: See HPI    PE:  APPEARANCE: Well nourished, well developed, in no acute distress.   HEAD: Normocephalic, atraumatic.  EYES: PERRL. EOMI.   EARS: TM's intact. Light reflex normal. No retraction or perforation.   NOSE: Mucosa pink. Airway clear.  MOUTH & THROAT: No tonsillar enlargement. No pharyngeal erythema or exudate. No stridor.  NECK: Supple.   NODES: No cervical, axillary or inguinal lymph node enlargement.  CHEST: Lungs clear to auscultation.  CARDIOVASCULAR: Normal S1, S2. No rubs, murmurs or gallops.  ABDOMEN: Bowel sounds normal. Not distended. Soft. No tenderness or masses.  NEUROLOGIC: Cranial Nerves: II-XII grossly intact, also see MUSCULOSKELETAL  MUSCULOSKELETAL:       Left Shoulder- Dressing intact, 2 plus radial  artery and ulnar artery pulses, light touch intact Left upper extremity.  All digits are warm. No erythema, no warmth, no drainage, No swelling, no significant tenderness.      ASSESSMENT:  The patient is stable and improving.      PLAN:  Sutures removed  Continue pain medication  Continue wound care  Continue occupational therapy

## 2018-01-03 ENCOUNTER — CLINICAL SUPPORT (OUTPATIENT)
Dept: REHABILITATION | Facility: HOSPITAL | Age: 53
End: 2018-01-03
Attending: ORTHOPAEDIC SURGERY
Payer: COMMERCIAL

## 2018-01-03 DIAGNOSIS — Z98.890 POST-OPERATIVE STATE: Primary | ICD-10-CM

## 2018-01-03 DIAGNOSIS — M75.42 IMPINGEMENT SYNDROME OF LEFT SHOULDER: ICD-10-CM

## 2018-01-03 PROCEDURE — 97110 THERAPEUTIC EXERCISES: CPT

## 2018-01-03 NOTE — PROGRESS NOTES
Occupational Therapy Shoulder Daily Treatment Note      Patient:  Rosalva Stauffer  Date of Evaluation: 1/3/2018  MRN: [id number]  Referring Physician:  John Orosco Sr., MD  Diagnosis: L Bankart repair, subacromial decompression, and distal clavicle excision  Referral Orders:   Eval and treat     Start Time: 8:50 am  End Time: 9:30 am  Total Time: 40 min           Occupation:  , all clerical work  Working presently:  No x 6 weeks due to sx    Date of onset: 6-7 months  Mechanism of Injury:   Gradual onset of pain that increased to difficulty with ROM.     Visit 3 of 20; Expires 12/31/2017    Hand dominance: R  DOS: 12/6/17  S/P: 4 weeks    Subjective: Pt reports soreness in tricep. Also states that she is unable to get any sleep due to the sling.        Objective:  Range of Motion:    Passive Shoulder Flexion approx 40 degrees (+5)  Passive Shoulder ER to 0    Functional Limitations and Goal:   Self Care: Limited use of LUE for ADLs, bathing, dressing, overhead ADLS  Work/Activity : Limited use of LUE for driving, work tasks, laundry, housework,   Leisure:  Limited use of LUE for gym workouts       Treatment included :    Modalities: Moist Heat applied to L shoulder x 10 min to decrease pain and  increase blood flow and tissue elasticity prior to treatment.   Manual Therapy: (3 min) STM to tricep  There Ex: (27 min)    -Passive shoulder flexion to approx 40   -Passive ER to 0   -Passive stretch elbow by OT   -Passive and self passive extrinsic extensor stretch, maintaining shoulder precautions.    -AROM Forearm rotation 10x (NT)   -Wrist PREs 1#, 3 ways, 20 ea   -Yellow putty squeezing 3 m in   -AROM Cervica E/F, Sidebending, and lateral rotation, 10x ea (NT)   -Codman's to 45, all planes    Assessment: Small gains in passive flexion. Improved elbow extension, but limited at end range. Also had difficulty with wrist PREs. Educated pt re: using sling x 6 weeks, but to try unhooking  neck portion. If still unable to sleep, advised pt to allow elbow to straighten but to ensure abduction pillow is used. Reviewed precautions against any external rotation. Some shaking of hand observed with Codman's, pt reported due to Parkinsons and being cold.       Problem List:    Healing Incision  Post operative edema  Decreased ROM,  Decreased strength,  Decreased functional use,  Increased pain     Short Term Goals:  ( 4 weeks)   1) Patient will be independent with HEP and modalities for pain management. (met)  2) Pt will exhibit increased passive flexion to 45 degrees.  (partially met)  3 Pt will report decreased pain to 2-3  out of 10 at worst.  4) Pt will exhibit a Quick DASH score of 40-50%, evidencing improving functional use of L Upper extremity.       Long Term Goals (12-16 weeks)  1) Patient will be return to PLOF, including work and light gym workouts.  2) Pt will exhibit increased AROM  At 140-160 degrees  For flexion, abduction and WFL ER/IR to enable Reaching overhead shelves, washing hair/back, donning belt.   3) Pt will exhibit 4/5 MMT for shoulder muscles to enable placing objects in overhead cabinets.   4) Pt will report decreased pain to 2 out of 10 with ADLs.  5) Pt will exhibit a Quick DASH score between  1-19 %, indicative of significant improvement in functional abilities.        Plan:  Continue skilled Occupational Therapy 1  times per week for 4  weeks then increase to 2x/week x 12 weeks to address the above deficits and achieve the established goals.  Pt going on vacation x 1 week.She will return to therapy the following week.    Treatment to include Modalities for pain management (moist heat, cold packs, Ultrasound, iontophoresis with Dexamethasone, electrical stimulation),  Manual therapy/ Joint Mobilizations,Therapeutic exercises / activities, pt education, HEP,  as well as any other treatments deemed necessary in order to maximize painfree functional use of  L  UE.             I  certify the need for these services furnished under this plan of treatment and while under my care.  GRAEME Puentes, DHAVAL      ____________________________________                         __________________  Physician/Referring Practitioner                                               Date of Signature

## 2018-01-04 ENCOUNTER — PATIENT MESSAGE (OUTPATIENT)
Dept: FAMILY MEDICINE | Facility: CLINIC | Age: 53
End: 2018-01-04

## 2018-01-08 ENCOUNTER — CLINICAL SUPPORT (OUTPATIENT)
Dept: REHABILITATION | Facility: HOSPITAL | Age: 53
End: 2018-01-08
Attending: ORTHOPAEDIC SURGERY
Payer: COMMERCIAL

## 2018-01-08 DIAGNOSIS — Z98.890 POST-OPERATIVE STATE: Primary | ICD-10-CM

## 2018-01-08 DIAGNOSIS — M75.42 IMPINGEMENT SYNDROME OF LEFT SHOULDER: ICD-10-CM

## 2018-01-08 PROCEDURE — 97110 THERAPEUTIC EXERCISES: CPT

## 2018-01-08 NOTE — PROGRESS NOTES
Occupational Therapy Shoulder Daily Treatment Note      Patient:  Rosalva Stauffer  Date of Evaluation: 1/8/2018  MRN: [id number]  Referring Physician:  John Orosco Sr., MD  Diagnosis: L Bankart repair, subacromial decompression, and distal clavicle excision  Referral Orders:   Eval and treat     Start Time: 8:40 am  End Time: 9:20 am  Total Time: 40 min           Occupation:  , all clerical work  Working presently:  No x 6 weeks due to sx    Date of onset: 6-7 months  Mechanism of Injury:   Gradual onset of pain that increased to difficulty with ROM.     Visit 4 of 20; Expires 12/31/2017    Hand dominance: R  DOS: 12/6/17  S/P: 5 weeks    Subjective: Pt reports 2/10 pain at worst. She unhooked neck portion of sling for a short time during sleep, but rehooked it as she could feel pressure on her shoulder.       Objective:  Range of Motion:    Passive Shoulder Flexion approx 45 degrees (+5)  Passive Shoulder ER to 0    Functional Limitations and Goal:   Self Care: Limited use of LUE for ADLs, bathing, dressing, overhead ADLS  Work/Activity : Limited use of LUE for driving, work tasks, laundry, housework,   Leisure:  Limited use of LUE for gym workouts       Treatment included :    Modalities: Moist Heat applied to L shoulder x 10 min to decrease pain and  increase blood flow and tissue elasticity prior to treatment.   Manual Therapy: (0 min)   There Ex: (30 min)    -Passive shoulder flexion to approx 40   -Passive ER to 0   -Passive stretch elbow by OT   -Passive and self passive extrinsic extensor stretch, maintaining shoulder precautions. (NT)   -AROM Forearm rotation 10x (NT)   -Wrist PREs 1#, 3 ways, 20 ea   -Yellow putty squeezing 3 m in   -Scapular AROM, (E/D, P/R, and shoulder rolls and reverse rolls) 10x ea   -Shoulder isometrics all planes, 3 sec holds, 10x ea    Assessment: Progressing well. Tolerated program upgrade well.     Problem List:    Healing Incision  Post operative  edema  Decreased ROM,  Decreased strength,  Decreased functional use,  Increased pain     Short Term Goals:  ( 4 weeks)   1) Patient will be independent with HEP and modalities for pain management. (met)  2) Pt will exhibit increased passive flexion to 45 degrees.  (partially met)  3 Pt will report decreased pain to 2-3  out of 10 at worst. (met)  4) Pt will exhibit a Quick DASH score of 40-50%, evidencing improving functional use of L Upper extremity.       Long Term Goals (12-16 weeks)  1) Patient will be return to PLOF, including work and light gym workouts.  2) Pt will exhibit increased AROM  At 140-160 degrees  For flexion, abduction and WFL ER/IR to enable Reaching overhead shelves, washing hair/back, donning belt.   3) Pt will exhibit 4/5 MMT for shoulder muscles to enable placing objects in overhead cabinets.   4) Pt will report decreased pain to 2 out of 10 with ADLs.  5) Pt will exhibit a Quick DASH score between  1-19 %, indicative of significant improvement in functional abilities.        Plan:  Recommended increasing frequency to 2x/week next week.     Treatment to include Modalities for pain management (moist heat, cold packs, Ultrasound, iontophoresis with Dexamethasone, electrical stimulation),  Manual therapy/ Joint Mobilizations,Therapeutic exercises / activities, pt education, HEP,  as well as any other treatments deemed necessary in order to maximize painfree functional use of  L  UE.             I certify the need for these services furnished under this plan of treatment and while under my care.  GRAEME Puentes, DHAVAL      ____________________________________                         __________________  Physician/Referring Practitioner                                               Date of Signature

## 2018-01-15 ENCOUNTER — CLINICAL SUPPORT (OUTPATIENT)
Dept: REHABILITATION | Facility: HOSPITAL | Age: 53
End: 2018-01-15
Attending: ORTHOPAEDIC SURGERY
Payer: COMMERCIAL

## 2018-01-15 DIAGNOSIS — Z98.890 POST-OPERATIVE STATE: Primary | ICD-10-CM

## 2018-01-15 DIAGNOSIS — M75.42 IMPINGEMENT SYNDROME OF LEFT SHOULDER: ICD-10-CM

## 2018-01-15 PROCEDURE — 97110 THERAPEUTIC EXERCISES: CPT

## 2018-01-15 NOTE — PROGRESS NOTES
Occupational Therapy Shoulder Daily Treatment Note      Patient:  Rosalva Stauffer  Date of Visit: 1/15/2018  MRN: [id number]  Referring Physician:  John Orosco Sr., MD  Diagnosis: L Bankart repair, subacromial decompression, and distal clavicle excision  Referral Orders:   Eval and treat     Start Time: 9:00 am  End Time: 9:45 am           Occupation:  , all clerical work  Working presently:  No x 6 weeks due to sx    Date of onset: 6-7 months  Mechanism of Injury:   Gradual onset of pain that increased to difficulty with ROM.     Visit 5 of 20; Expires 12/31/2017    Hand dominance: R  DOS: 12/6/17  S/P: 5.5 weeks    Subjective: Pt reports minimal pain unless she gets in a certain position.       Objective:  Range of Motion:    Passive Shoulder Flexion approx 95 degrees (+50)  Passive Shoulder ER to 10 (+10)    Functional Limitations and Goal:   Self Care: Limited use of LUE for ADLs, bathing, dressing, overhead ADLS  Work/Activity : Limited use of LUE for driving, work tasks, laundry, housework,   Leisure:  Limited use of LUE for gym workouts       Treatment included :    Modalities: Moist Heat applied to L shoulder x 15 min to decrease pain and  increase blood flow and tissue elasticity prior to treatment.   Manual Therapy: (0 min)   There Ex: (30 min)    -Passive shoulder flexion to approx 95   -Passive ER to 10   -Passive stretch elbow by OT   -Passive and self passive extrinsic extensor stretch, maintaining shoulder precautions.    -AROM Forearm rotation 10x    -Wrist PREs 2#, 3 ways, 20 ea   -Yellow putty squeezing 3 m in   -Scapular AROM, (E/D, P/R, and shoulder rolls and reverse rolls) 10x ea   -Shoulder isometrics all planes, 3 sec holds, 10x ea   -Codman's    Assessment: Good PROM gains this date. Pain decreasing steadily. Extrinsic tightness noted in bicep in max tolerated flexion.     Problem List:    Healing Incision  Post operative edema  Decreased ROM,  Decreased  strength,  Decreased functional use,  Increased pain     Short Term Goals:  ( 4 weeks)   1) Patient will be independent with HEP and modalities for pain management. (met)  2) Pt will exhibit increased passive flexion to 45 degrees.  (partially met)  3 Pt will report decreased pain to 2-3  out of 10 at worst. (met)  4) Pt will exhibit a Quick DASH score of 40-50%, evidencing improving functional use of L Upper extremity.       Long Term Goals (12-16 weeks)  1) Patient will be return to PLOF, including work and light gym workouts.  2) Pt will exhibit increased AROM  At 140-160 degrees  For flexion, abduction and WFL ER/IR to enable Reaching overhead shelves, washing hair/back, donning belt.   3) Pt will exhibit 4/5 MMT for shoulder muscles to enable placing objects in overhead cabinets.   4) Pt will report decreased pain to 2 out of 10 with ADLs.  5) Pt will exhibit a Quick DASH score between  1-19 %, indicative of significant improvement in functional abilities.        Plan:  Continue OT POC. Initate AROM per protocol next visit.   Treatment to include Modalities for pain management (moist heat, cold packs, Ultrasound, iontophoresis with Dexamethasone, electrical stimulation),  Manual therapy/ Joint Mobilizations,Therapeutic exercises / activities, pt education, HEP,  as well as any other treatments deemed necessary in order to maximize painfree functional use of  L  UE.             I certify the need for these services furnished under this plan of treatment and while under my care.  GRAEME Puentes, [facility]      ____________________________________                         __________________  Physician/Referring Practitioner                                               Date of Signature

## 2018-01-23 ENCOUNTER — OFFICE VISIT (OUTPATIENT)
Dept: ORTHOPEDICS | Facility: CLINIC | Age: 53
End: 2018-01-23
Payer: COMMERCIAL

## 2018-01-23 VITALS
HEART RATE: 68 BPM | HEIGHT: 66 IN | DIASTOLIC BLOOD PRESSURE: 75 MMHG | SYSTOLIC BLOOD PRESSURE: 119 MMHG | BODY MASS INDEX: 22.85 KG/M2 | WEIGHT: 142.19 LBS

## 2018-01-23 DIAGNOSIS — Z98.890 POST-OPERATIVE STATE: Primary | ICD-10-CM

## 2018-01-23 PROCEDURE — 99024 POSTOP FOLLOW-UP VISIT: CPT | Mod: S$GLB,,, | Performed by: PHYSICIAN ASSISTANT

## 2018-01-23 PROCEDURE — 99999 PR PBB SHADOW E&M-EST. PATIENT-LVL III: CPT | Mod: PBBFAC,,, | Performed by: PHYSICIAN ASSISTANT

## 2018-01-23 RX ORDER — PRAMIPEXOLE DIHYDROCHLORIDE 0.5 MG/1
0.75 TABLET ORAL 3 TIMES DAILY
COMMUNITY
Start: 2017-12-31 | End: 2020-06-02

## 2018-01-23 NOTE — LETTER
January 23, 2018      Lalita Patterson MD  [address]  [place] [address]           [facility]  [address] [facility] [address]  [place] [address]  Phone: [phone]  Fax: [phone]          Patient: Rosalva Stauffer   MR Number: [id number]   YOB: 1965   Date of Visit: 1/23/2018       Dear Dr. Lalita Patterson:    Thank you for referring Rosalva Stauffer to me for evaluation. Attached you will find relevant portions of my assessment and plan of care.    If you have questions, please do not hesitate to call me. I look forward to following Rosalva Stauffer along with you.    Sincerely,    Herb Dang PA-C    Enclosure  CC:  No Recipients    If you would like to receive this communication electronically, please contact [email or web address] or [phone] to request more information on [employer] access.    For providers and/or their staff who would like to refer a patient to [facility], please contact us through our one-stop-shop provider referral line, [facility], at [phone].    If you feel you have received this communication in error or would no longer like to receive these types of communications, please e-mail [email or web address]

## 2018-01-23 NOTE — PROGRESS NOTES
SUBJECTIVE:  Patient is status post Left shoulder ATS .  Patient complains of   0/ 10 pain that is better with the  pain meds and aggravated with movement.    Date of surgery: 2017- Left shoulder subacromial decompression, distal clavicle excision, extensive debridement,  Arthroscopic Bankart repair      Past Medical History:   Diagnosis Date    DDD (degenerative disc disease), cervical     Hyperlipidemia     Hypertension     Leiomyoma     Parkinson's disease     Polymenorrhea     Prediabetes     Seasonal allergies      Past Surgical History:   Procedure Laterality Date     SECTION, LOW TRANSVERSE      x1    GANGLION CYST EXCISION      HERNIA REPAIR      HYSTERECTOMY      Lysis of adhesions and enterotomy closure  2013    ROBOTIC ASSISTED HYSTERECTOMY  2013    With unilateral SO    Submaxillary gland drainage       Review of patient's allergies indicates:   Allergen Reactions    Allegra [fexofenadine] Palpitations    Allegra-d 12 hour [fexofenadine-pseudoephedrine] Palpitations     Current Outpatient Prescriptions on File Prior to Visit   Medication Sig Dispense Refill    hydroCHLOROthiazide (MICROZIDE) 12.5 mg capsule Take 1 capsule (12.5 mg total) by mouth once daily. 90 capsule 3    multivitamin capsule Take 1 capsule by mouth once daily.      rasagiline (AZILECT) 1 mg Tab Take 1 mg by mouth once daily.      [DISCONTINUED] amoxicillin-clavulanate 875-125mg (AUGMENTIN) 875-125 mg per tablet Take 1 tablet by mouth every 12 (twelve) hours.      [DISCONTINUED] oxyCODONE-acetaminophen (PERCOCET)  mg per tablet Take 1 tablet by mouth every 4 (four) hours as needed. 90 tablet 0    [DISCONTINUED] pramipexole (MIRAPEX) 0.25 MG tablet Take 0.5 mg by mouth 3 (three) times daily.        No current facility-administered medications on file prior to visit.      /75 (BP Location: Right arm, Patient Position: Sitting, BP Method: Medium (Automatic))   Pulse 68   Ht  "5' 6" (1.676 m)   Wt 64.5 kg (142 lb 3.2 oz)   LMP 05/28/2013   BMI 22.95 kg/m²    ROS:  GENERAL: No fever, chills, fatigability or weight loss.  SKIN: No rashes, itching or changes in color or texture of skin.  HEAD: No headaches or recent head trauma.  EYES: Visual acuity fine. No photophobia, ocular pain or diplopia.  EARS: Denies ear pain, discharge or vertigo.  NOSE: No loss of smell, no epistaxis or postnasal drip.  MOUTH & THROAT: No hoarseness or change in voice. No excessive gum bleeding.  NODES: Denies swollen glands.  CHEST: Denies NASH, cyanosis, wheezing, cough and sputum production.  CARDIOVASCULAR: Denies chest pain, PND, orthopnea or reduced exercise tolerance.  ABDOMEN: Appetite fine. No weight loss. Denies diarrhea, abdominal pain, hematemesis or blood in stool.  URINARY: No flank pain, dysuria or hematuria.  PERIPHERAL VASCULAR: No claudication or cyanosis.  NEUROLOGIC: No history of seizures, paralysis, alteration of gait or coordination.  MUSCULOSKELETAL: See HPI    PE:  APPEARANCE: Well nourished, well developed, in no acute distress.   HEAD: Normocephalic, atraumatic.  EYES: PERRL. EOMI.   EARS: TM's intact. Light reflex normal. No retraction or perforation.   NOSE: Mucosa pink. Airway clear.  MOUTH & THROAT: No tonsillar enlargement. No pharyngeal erythema or exudate. No stridor.  NECK: Supple.   NODES: No cervical, axillary or inguinal lymph node enlargement.  CHEST: Lungs clear to auscultation.  CARDIOVASCULAR: Normal S1, S2. No rubs, murmurs or gallops.  ABDOMEN: Bowel sounds normal. Not distended. Soft. No tenderness or masses.  NEUROLOGIC: Cranial Nerves: II-XII grossly intact, also see MUSCULOSKELETAL  MUSCULOSKELETAL:       Left Shoulder- , 2 plus radial artery and ulnar artery pulses, light touch intact Left upper extremity.  All digits are warm. No erythema, no warmth, no drainage, No swelling, no significant tenderness.      ASSESSMENT:  The patient is stable and " improving.      PLAN:  Ok to removed brace, avoid injury  Return to work note give ( office- no lifting)  Continue occupational therapy, and OT

## 2018-02-05 ENCOUNTER — CLINICAL SUPPORT (OUTPATIENT)
Dept: REHABILITATION | Facility: HOSPITAL | Age: 53
End: 2018-02-05
Attending: ORTHOPAEDIC SURGERY
Payer: COMMERCIAL

## 2018-02-05 DIAGNOSIS — Z98.890 POST-OPERATIVE STATE: Primary | ICD-10-CM

## 2018-02-05 DIAGNOSIS — M75.42 IMPINGEMENT SYNDROME OF LEFT SHOULDER: ICD-10-CM

## 2018-02-05 PROCEDURE — 97110 THERAPEUTIC EXERCISES: CPT

## 2018-02-05 NOTE — PROGRESS NOTES
Occupational Therapy Shoulder Daily Treatment Note      Patient:  Rosalva Stauffer  Date of Visit: 2/5/2018  MRN: [id number]  Referring Physician:  John Orosco Sr., MD  Diagnosis: L Bankart repair, subacromial decompression, and distal clavicle excision  Referral Orders:   Eval and treat     Start Time: 9:45 am  End Time: 10:45 am           Occupation:  , all clerical work  Working presently:  No x 6 weeks due to sx    Date of onset: 6-7 months  Mechanism of Injury:   Gradual onset of pain that increased to difficulty with ROM.     Visit 6 of 20; Expires 12/31/2017    Hand dominance: R  DOS: 12/6/17  S/P: 8.5 weeks    Subjective: Pt reports she forgot about her appointment last week. Other visits cancelled due to weather. She has been using       Objective:  Range of Motion:      AROM     PROM  Shoulder Ext/flex  34/130  NT/150  Abduction 0/120   0/90 (pain)  ER/IR at 0 degrees 55/20  60/50    Functional Limitations and Goal:   Self Care: Limited use of LUE for ADLs, bathing, dressing, overhead ADLS  Work/Activity : Limited use of LUE for driving, work tasks, laundry, housework,   Leisure:  Limited use of LUE for gym workouts       Treatment included :    Modalities: Moist Heat applied to L shoulder x 10 min to decrease pain and  increase blood flow and tissue elasticity prior to treatment.   Manual Therapy: (3 min) GHJ mobs, inferior and posterior glides, with oscillations  There Ex: (47 min)    -Passive shoulder flexion, scaption, abduction, and ER/IR at side, 45 and 90   -Supine Horizontal abd/adduction 10x   -Serratus punches 2/10   -Wand exercises flexion, abduction, scaption, chest press 10x ea   -Tricep press supine 3/10    -Sidelying ER 2/10   -Prone extension and horiz abd 2/10   -Standing: flexion, abduction, scaption, ER/IR at side 10x ea   -Ball rolls tabletop 10x flexion, scaption, abduction    -Weight shifts into tabletop 30x    Assessment: Baseline AROM measures taken  today with pt exhibiting good AROM despite several missed weeks of therapy. Good PROM as well except for abduction due to pain.  Pt tolerated progression of exercises very well.       Problem List:    Healing Incision  Post operative edema  Decreased ROM,  Decreased strength,  Decreased functional use,  Increased pain     Short Term Goals:  ( 4 weeks)   1) Patient will be independent with HEP and modalities for pain management. (met)  2) Pt will exhibit increased passive flexion to 45 degrees.  (met)  3 Pt will report decreased pain to 2-3  out of 10 at worst. (met)  4) Pt will exhibit a Quick DASH score of 40-50%, evidencing improving functional use of L Upper extremity.       Long Term Goals (12-16 weeks)  1) Patient will be return to PLOF, including work and light gym workouts.  2) Pt will exhibit increased AROM  At 140-160 degrees  For flexion, abduction and WFL ER/IR to enable Reaching overhead shelves, washing hair/back, donning belt.   3) Pt will exhibit 4/5 MMT for shoulder muscles to enable placing objects in overhead cabinets.   4) Pt will report decreased pain to 2 out of 10 with ADLs.  5) Pt will exhibit a Quick DASH score between  1-19 %, indicative of significant improvement in functional abilities.        Plan:  Continue OT POC. Initate AROM per protocol next visit.   Treatment to include Modalities for pain management (moist heat, cold packs, Ultrasound, iontophoresis with Dexamethasone, electrical stimulation),  Manual therapy/ Joint Mobilizations,Therapeutic exercises / activities, pt education, HEP,  as well as any other treatments deemed necessary in order to maximize painfree functional use of  L  UE.             I certify the need for these services furnished under this plan of treatment and while under my care.  GRAEME Puentes, [facility]      ____________________________________                         __________________  Physician/Referring Practitioner                                                Date of Signature

## 2018-02-08 ENCOUNTER — CLINICAL SUPPORT (OUTPATIENT)
Dept: REHABILITATION | Facility: HOSPITAL | Age: 53
End: 2018-02-08
Attending: ORTHOPAEDIC SURGERY
Payer: COMMERCIAL

## 2018-02-08 DIAGNOSIS — M75.42 IMPINGEMENT SYNDROME OF LEFT SHOULDER: ICD-10-CM

## 2018-02-08 DIAGNOSIS — Z98.890 POST-OPERATIVE STATE: Primary | ICD-10-CM

## 2018-02-08 PROCEDURE — 97110 THERAPEUTIC EXERCISES: CPT

## 2018-02-08 NOTE — PROGRESS NOTES
Occupational Therapy Shoulder Daily Treatment Note      Patient:  Rosalva Stauffer  Date of Visit: 2/8/2018  MRN: [id number]  Referring Physician:  John Orosco Sr., MD  Diagnosis: L Bankart repair, subacromial decompression, and distal clavicle excision  Referral Orders:   Eval and treat     Start Time: 1:00 pm  End Time: 1: 53  pm           Occupation:  , all clerical work    Date of onset: 6-7 months  Mechanism of Injury:   Gradual onset of pain that increased to difficulty with ROM.     Visit 7 of 20; Expires 12/31/2017    Hand dominance: R  DOS: 12/6/17  S/P: 9  Weeks  day    Subjective: Pt reports she is able to do everything she needs to at work. Was not overly sore after last session.       Objective:  Range of Motion:      AROM     PROM  Shoulder Ext/flex  34/130  NT/150  Abduction 0/120   0/90 (pain)  ER/IR at 0 degrees 55/20  60/50    Functional Limitations and Goal:   Self Care: Limited use of LUE for ADLs, bathing, dressing, overhead ADLS  Work/Activity : Limited use of LUE for driving, work tasks, laundry, housework,   Leisure:  Limited use of LUE for gym workouts       Treatment included :    Modalities: Moist Heat applied to L shoulder x 10 min to decrease pain and  increase blood flow and tissue elasticity prior to treatment.   Manual Therapy: (3 min) GHJ mobs, inferior and posterior glides, with oscillations  There Ex: (40 min)    -Passive shoulder flexion, scaption, abduction, and ER/IR at side, 45 and 90   -AA Supine Horizontal abd/adduction 10x   -Serratus punches 2/10   -Wand exercises flexion, abduction, scaption, chest press 10x ea (NT)   -Tricep press supine 3/10    -Sidelying ER 2/10   -Prone extension and horiz abd 2/10   -Standing: flexion, abduction, scaption, ER/IR at side 10x ea   -Ball rolls tabletop 10x flexion, scaption, abduction    -Weight shifts into tabletop 30x   -Wall ladder 5 reps each for flexion, scaption, and abduction    Assessment: Pt with  increased guarding today with PROM/passive stretch. Tolerated there ex well but fatigued post tx.     Problem List:    Healing Incision  Post operative edema  Decreased ROM,  Decreased strength,  Decreased functional use,  Increased pain     Short Term Goals:  ( 4 weeks)   1) Patient will be independent with HEP and modalities for pain management. (met)  2) Pt will exhibit increased passive flexion to 45 degrees.  (met)  3 Pt will report decreased pain to 2-3  out of 10 at worst. (met)  4) Pt will exhibit a Quick DASH score of 40-50%, evidencing improving functional use of L Upper extremity.       Long Term Goals (12-16 weeks)  1) Patient will be return to PLOF, including work and light gym workouts.  2) Pt will exhibit increased AROM  At 140-160 degrees  For flexion, abduction and WFL ER/IR to enable Reaching overhead shelves, washing hair/back, donning belt.   3) Pt will exhibit 4/5 MMT for shoulder muscles to enable placing objects in overhead cabinets.   4) Pt will report decreased pain to 2 out of 10 with ADLs.  5) Pt will exhibit a Quick DASH score between  1-19 %, indicative of significant improvement in functional abilities.        Plan:  Continue OT POC. Initate AROM per protocol next visit.   Treatment to include Modalities for pain management (moist heat, cold packs, Ultrasound, iontophoresis with Dexamethasone, electrical stimulation),  Manual therapy/ Joint Mobilizations,Therapeutic exercises / activities, pt education, HEP,  as well as any other treatments deemed necessary in order to maximize painfree functional use of  L  UE.             I certify the need for these services furnished under this plan of treatment and while under my care.  GRAEME Puentes, [facility]      ____________________________________                         __________________  Physician/Referring Practitioner                                               Date of Signature

## 2018-02-12 ENCOUNTER — CLINICAL SUPPORT (OUTPATIENT)
Dept: REHABILITATION | Facility: HOSPITAL | Age: 53
End: 2018-02-12
Attending: ORTHOPAEDIC SURGERY
Payer: COMMERCIAL

## 2018-02-12 DIAGNOSIS — Z98.890 POST-OPERATIVE STATE: Primary | ICD-10-CM

## 2018-02-12 DIAGNOSIS — M75.42 IMPINGEMENT SYNDROME OF LEFT SHOULDER: ICD-10-CM

## 2018-02-12 PROCEDURE — 97110 THERAPEUTIC EXERCISES: CPT

## 2018-02-12 NOTE — PROGRESS NOTES
Occupational Therapy Shoulder Daily Treatment Note      Patient:  Rosalva Stauffer  Date of Visit: 2/12/2018  MRN: [id number]  Referring Physician:  John Orosco Sr., MD  Diagnosis: L Bankart repair, subacromial decompression, and distal clavicle excision  Referral Orders:   Eval and treat     Start Time: 9:50 am  End Time: 10: 53  am           Occupation:  , all clerical work    Date of onset: 6-7 months  Mechanism of Injury:   Gradual onset of pain that increased to difficulty with ROM.     Visit 8 of 20; Expires 12/31/2017    Hand dominance: R  DOS: 12/6/17  S/P: 9  Weeks   5 days    Subjective: Pt reports she is ready for her shoulder to be back to normal.        Objective:  Range of Motion:      AROM      PROM  Shoulder Ext/flex  35/130 (+1/=)  NT/150  Abduction 0/80 (-40)   0/90 (pain)  ER/IR at 0 degrees 55/45 (=/+25)  60/50    Functional Limitations and Goal:   Self Care: Limited use of LUE for ADLs, bathing, dressing, overhead ADLS  Work/Activity : Limited use of LUE for driving, work tasks, laundry, housework,   Leisure:  Limited use of LUE for gym workouts       Treatment included :    Modalities: Moist Heat applied to L shoulder x 10 min to decrease pain and  increase blood flow and tissue elasticity prior to treatment.   Manual Therapy: (3 min) GHJ mobs, inferior and posterior glides, with oscillations  There Ex: (50 min)    -Passive shoulder flexion, scaption, abduction, and ER/IR at side, 45 and 90   -AA Supine Horizontal abd/adduction 10x   -Serratus punches 3/10   -Wand exercises flexion, abduction, scaption, chest press 10x ea (NT)   -Tricep press supine 3/10    -Sidelying ER 3/10 with 1#   -Prone extension and horiz abd, lower trap with 1# 2/10   -Standing: flexion, abduction, scaption, ER/IR at side 2/10x ea with 1#   -Ball rolls tabletop 10x flexion, scaption, abduction    -Weight shifts into tabletop 30x (NT)   -Wall ladder 5 reps each for flexion, scaption, and  abduction (NT)   -UBE 3 min ea direction 1.0   -Yellow theraband: rows, Ir, and adduction 2/10 each   - press in scaption with 1# 2/10   -Bicep curl 3 ways, 10 ea, with 2#    Assessment: Tolerated strengthening well.   Problem List:    Healing Incision  Post operative edema  Decreased ROM,  Decreased strength,  Decreased functional use,  Increased pain     Short Term Goals:  ( 4 weeks)   1) Patient will be independent with HEP and modalities for pain management. (met)  2) Pt will exhibit increased passive flexion to 45 degrees.  (met)  3 Pt will report decreased pain to 2-3  out of 10 at worst. (met)  4) Pt will exhibit a Quick DASH score of 40-50%, evidencing improving functional use of L Upper extremity.       Long Term Goals (12-16 weeks)  1) Patient will be return to PLOF, including work and light gym workouts.  2) Pt will exhibit increased AROM  At 140-160 degrees  For flexion, abduction and WFL ER/IR to enable Reaching overhead shelves, washing hair/back, donning belt.   3) Pt will exhibit 4/5 MMT for shoulder muscles to enable placing objects in overhead cabinets.   4) Pt will report decreased pain to 2 out of 10 with ADLs.  5) Pt will exhibit a Quick DASH score between  1-19 %, indicative of significant improvement in functional abilities.        Plan:  Continue OT POC. Initate AROM per protocol next visit.   Treatment to include Modalities for pain management (moist heat, cold packs, Ultrasound, iontophoresis with Dexamethasone, electrical stimulation),  Manual therapy/ Joint Mobilizations,Therapeutic exercises / activities, pt education, HEP,  as well as any other treatments deemed necessary in order to maximize painfree functional use of  L  UE.             I certify the need for these services furnished under this plan of treatment and while under my care.  GRAEME Puentes, [facility]      ____________________________________                         __________________  Physician/Referring  Practitioner                                               Date of Signature

## 2018-02-14 ENCOUNTER — CLINICAL SUPPORT (OUTPATIENT)
Dept: REHABILITATION | Facility: HOSPITAL | Age: 53
End: 2018-02-14
Attending: ORTHOPAEDIC SURGERY
Payer: COMMERCIAL

## 2018-02-14 DIAGNOSIS — Z98.890 POST-OPERATIVE STATE: Primary | ICD-10-CM

## 2018-02-14 DIAGNOSIS — M75.42 IMPINGEMENT SYNDROME OF LEFT SHOULDER: ICD-10-CM

## 2018-02-14 PROCEDURE — 97110 THERAPEUTIC EXERCISES: CPT

## 2018-02-14 NOTE — PROGRESS NOTES
Occupational Therapy Shoulder Daily Treatment Note      Patient:  Rosalva Stauffer  Date of Visit: 2/14/2018  MRN: [id number]  Referring Physician:  John Orosco Sr., MD  Diagnosis: L Bankart repair, subacromial decompression, and distal clavicle excision  Referral Orders:   Eval and treat     Start Time: 8:50 am  End Time: 9:55  am           Occupation:  , all clerical work    Date of onset: 6-7 months  Mechanism of Injury:   Gradual onset of pain that increased to difficulty with ROM.     Visit 9 of 20; Expires 12/31/2017    Hand dominance: R  DOS: 12/6/17  S/P: 10  Weeks   0 days    Subjective: Pt reports minimal soreness.       Objective:  Range of Motion:      AROM      PROM  Shoulder Ext/flex  35/130 (+1/=)  NT/150  Abduction 0/80 (-40)   0/90 (pain)  ER/IR at 0 degrees 55/45 (=/+25)  60/50    Functional Limitations and Goal:   Self Care: Limited use of LUE for ADLs, bathing, dressing, overhead ADLS  Work/Activity : Limited use of LUE for driving, work tasks, laundry, housework,   Leisure:  Limited use of LUE for gym workouts       Treatment included :    Modalities: Moist Heat applied to L shoulder x 10 min to decrease pain and  increase blood flow and tissue elasticity prior to treatment.   Manual Therapy: (3 min) GHJ mobs, inferior and posterior glides, with oscillations  There Ex: (52 min)    -Passive shoulder flexion, scaption, abduction, and ER/IR at side, 45 and 90   -Supine Horizontal Self adduction stretch   -Serratus punches 3/10 2#   -Tricep press supine 3/10 2#   -Sidelying ER 3/10 with 2#   -Prone extension and horiz abd, lower trap with 2# 2/10   -Standing: flexion, abduction, scaption, ER/IR at side 2/10x ea with 1#   -Ball rolls tabletop 10x flexion, scaption, abduction (NT)   -Wall slides 10x ea flexion, scaption, abd   -Wall ladder 5 reps each for flexion, scaption, and abduction    -UBE 3 min ea direction 1.0   -red theraband: rows, Ir, and adduction (NT), Er,  flexion, ext and abd 2/10 each   - press in scaption with 1# 2/10 (NT)   -Bicep curl 3 ways, 10 ea, with 2# (during heat)   -yellow putty squeezing 3 min (during heat)    Assessment:     Problem List:    Healing Incision  Post operative edema  Decreased ROM,  Decreased strength,  Decreased functional use,  Increased pain     Short Term Goals:  ( 4 weeks)   1) Patient will be independent with HEP and modalities for pain management. (met)  2) Pt will exhibit increased passive flexion to 45 degrees.  (met)  3 Pt will report decreased pain to 2-3  out of 10 at worst. (met)  4) Pt will exhibit a Quick DASH score of 40-50%, evidencing improving functional use of L Upper extremity.       Long Term Goals (12-16 weeks)  1) Patient will be return to PLOF, including work and light gym workouts.  2) Pt will exhibit increased AROM  At 140-160 degrees  For flexion, abduction and WFL ER/IR to enable Reaching overhead shelves, washing hair/back, donning belt.   3) Pt will exhibit 4/5 MMT for shoulder muscles to enable placing objects in overhead cabinets.   4) Pt will report decreased pain to 2 out of 10 with ADLs.  5) Pt will exhibit a Quick DASH score between  1-19 %, indicative of significant improvement in functional abilities.        Plan:  Continue OT POC. Initate AROM per protocol next visit.   Treatment to include Modalities for pain management (moist heat, cold packs, Ultrasound, iontophoresis with Dexamethasone, electrical stimulation),  Manual therapy/ Joint Mobilizations,Therapeutic exercises / activities, pt education, HEP,  as well as any other treatments deemed necessary in order to maximize painfree functional use of  L  UE.             I certify the need for these services furnished under this plan of treatment and while under my care.  GRAEME Puentes, [facility]      ____________________________________                         __________________  Physician/Referring Practitioner                                                Date of Signature

## 2018-02-15 NOTE — ANESTHESIA POSTPROCEDURE EVALUATION
"Anesthesia Post Evaluation    Patient: Rosalva Stauffer    Procedure(s) Performed: Procedure(s) (LRB):  ARTHROSCOPY-SHOULDER (Left)  DEBRIDEMENT-SHOULDER-ARTHROSCOPIC (Left)  ARTHROSCOPY-SHOULDER WITH SUBACROMIAL DECOMPRESSION (Left)  ARTHROSCOPY-SHOULDER EXCISION DISTAL CLAVICLE (Left)  ARTHROSCOPY SHOULDER, BANKHART REPAIR (LABRAL REPAIR) (Left)    Final Anesthesia Type: general  Patient location during evaluation: PACU  Patient participation: Yes- Able to Participate  Level of consciousness: awake and alert  Post-procedure vital signs: reviewed and stable  Pain management: adequate  Airway patency: patent  PONV status at discharge: No PONV  Anesthetic complications: no      Cardiovascular status: hemodynamically stable  Respiratory status: spontaneous ventilation  Hydration status: euvolemic  Follow-up not needed.        Visit Vitals  /76   Pulse 75   Temp 36.4 °C (97.5 °F) (Temporal)   Resp 14   Ht 5' 6" (1.676 m)   Wt 61.6 kg (135 lb 12.9 oz)   LMP 05/28/2013   SpO2 100%   Breastfeeding? No   BMI 21.92 kg/m²       Pain/Hector Score: No Data Recorded      "

## 2018-02-19 ENCOUNTER — CLINICAL SUPPORT (OUTPATIENT)
Dept: REHABILITATION | Facility: HOSPITAL | Age: 53
End: 2018-02-19
Attending: ORTHOPAEDIC SURGERY
Payer: COMMERCIAL

## 2018-02-19 DIAGNOSIS — Z98.890 POST-OPERATIVE STATE: Primary | ICD-10-CM

## 2018-02-19 DIAGNOSIS — M75.42 IMPINGEMENT SYNDROME OF LEFT SHOULDER: ICD-10-CM

## 2018-02-19 PROCEDURE — 97110 THERAPEUTIC EXERCISES: CPT

## 2018-02-19 NOTE — PROGRESS NOTES
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     Visit 10 of 20; Expires 12/31/2017    Hand dominance: R  DOS: 12/6/17  S/P: 10  Weeks   0 days    Subjective: Pt reports she has been using red theratubing at home.       Objective:  Range of Motion:      AROM      PROM  Shoulder Ext/flex  50/148 (+15/+18)  Nt/160 (+10)  Abduction 0/78 (-2)   0/130 (+40)  ER/IR at 0 degrees 80/63 (+25/+22)  90/70 (+30/+20)    Functional Limitations and Goal:   Self Care: Limited use of LUE for ADLs, bathing, dressing, overhead ADLS  Work/Activity : Limited use of LUE for driving, work tasks, laundry, housework,   Leisure:  Limited use of LUE for gym workouts       Treatment included :    Modalities: Moist Heat applied to L shoulder x 10 min to decrease pain and  increase blood flow and tissue elasticity prior to treatment.   Manual Therapy: (3 min) GHJ mobs, inferior and posterior glides, with oscillations  There Ex: (47 min)    -Passive shoulder flexion, scaption, abduction, horiz add, and ER/IR at side, 45 and 90   -Serratus punches 3/10 3#   -Tricep press supine 3/10 2#   -Sidelying ER 3/10 with 3#   -Prone extension and horiz abd, lower trap with 2# 2/10   -Standing: flexion, abduction, scaption, ER/IR at side 2/10x ea with 2#   -Ball rolls tabletop 10x flexion, scaption, abduction (NT)   -Wall slides 10x ea flexion, scaption, abd (NT)   -Wall ladder 5 reps each for flexion, scaption, and abduction (NT)   -UBE 3 min ea direction 2.0   -red theraband: rows, Ir,  and adduction, Er, flexion, ext and abd 2/10 each (NT)   - press in scaption with 1# 2/10 (NT)   -Bicep curl 3 ways, 10 ea, with 2# (NT)   -yellow putty squeezing 3 min (NT)   -Yellow theraband ER with scaption to 90 2/10    Assessment: Pt fatigued post treatment today. Both A and PROM improved this date. Pt doing very well with few complaints of pain, usually in abduction only. m    Problem List:    Healing Incision  Post operative edema  Decreased ROM,  Decreased strength,  Decreased functional use,  Increased pain   Short Term Goals:  ( 4 weeks)   1) Patient will be independent with HEP and modalities for pain management. (met)  2) Pt will exhibit increased passive flexion to 45 degrees.  (met)  3 Pt will report decreased pain to 2-3  out of 10 at worst. (met)  4) Pt will exhibit a Quick DASH score of 40-50%, evidencing improving functional use of L Upper extremity.       Long Term Goals (12-16 weeks)  1) Patient will be return to PLOF, including work and light gym workouts.  2) Pt will exhibit increased AROM  At 140-160 degrees  For flexion, abduction and WFL ER/IR to enable Reaching overhead shelves, washing hair/back, donning belt. (in progress)  3) Pt will exhibit 4/5 MMT for shoulder muscles to enable placing objects in overhead cabinets.   4) Pt will report decreased pain to 2 out of 10 with ADLs.  5) Pt will exhibit a Quick DASH score between  1-19 %, indicative of significant improvement in functional abilities.        Plan:  Continue OT POC. Initate AROM per protocol next visit.   Treatment to include Modalities for pain management (moist heat, cold packs, Ultrasound, iontophoresis with Dexamethasone, electrical stimulation),  Manual therapy/ Joint Mobilizations,Therapeutic exercises / activities, pt education, HEP,  as well as any other treatments deemed necessary in order to maximize painfree functional use of  L  UE.             I certify the need for these services furnished under this plan  of treatment and while under my care.  GRAEME Puentes, [facility]      ____________________________________                         __________________  Physician/Referring Practitioner                                               Date of Signature

## 2018-02-21 ENCOUNTER — CLINICAL SUPPORT (OUTPATIENT)
Dept: REHABILITATION | Facility: HOSPITAL | Age: 53
End: 2018-02-21
Attending: ORTHOPAEDIC SURGERY
Payer: COMMERCIAL

## 2018-02-21 DIAGNOSIS — Z98.890 POST-OPERATIVE STATE: Primary | ICD-10-CM

## 2018-02-21 DIAGNOSIS — M75.42 IMPINGEMENT SYNDROME OF LEFT SHOULDER: ICD-10-CM

## 2018-02-21 PROCEDURE — 97110 THERAPEUTIC EXERCISES: CPT

## 2018-02-21 NOTE — PROGRESS NOTES
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     Visit 11 of 20; Expires 12/31/2017    Hand dominance: R  DOS: 12/6/17  S/P: 10  Weeks   0 days    Subjective: Pt reports she is able to lay on L shoulder for short periods now.       Objective:  Range of Motion:      AROM      PROM  Shoulder Ext/flex  50/148 (+15/+18)  Nt/160 (+10)  Abduction 0/78 (-2)   0/130 (+40)  ER/IR at 0 degrees 80/63 (+25/+22)  90/70 (+30/+20)    Functional Limitations and Goal:   Self Care: Limited use of LUE for ADLs, bathing, dressing, overhead ADLS  Work/Activity : Limited use of LUE for driving, work tasks, laundry, housework,   Leisure:  Limited use of LUE for gym workouts       Treatment included :    Modalities: Moist Heat applied to L shoulder x 15 min to decrease pain and  increase blood flow and tissue elasticity prior to treatment.   Manual Therapy: (3 min) GHJ mobs, inferior and posterior glides, with oscillations  There Ex: (41 min)    -Passive shoulder flexion, scaption, abduction, horiz add, and ER/IR at side, 45 and 90   -Serratus punches 3/10 3#   -Tricep press supine 3/10 2#   -Sidelying ER 3/10 with 3#   -Prone extension and horiz abd, lower trap with 2# 2/10 (NT)   -Standing: flexion, abduction, scaption, ER/IR at side 2/10x ea with 2#   -Wall slides 10x ea flexion, scaption, abd (NT)   -UBE 3 min ea direction 2.0   -Pulley machine 10-20# rows, Ir, Er,ext    -Yellow theraband flexion, ext abd, diagonals x 2 2/10 each    -PNF D1 and D2 1# 10x  eeach   - press in scaption with 1# 2/10 (NT)   -Bicep curl 3 ways, 10 ea, with 2# (NT)   -Yellow theraband ER with scaption to 90 2/10 (NT)    Assessment: Pt continues to make good progress with improving PROM, strength and tolerance to there ex.     Problem List:    Healing Incision  Post operative edema  Decreased ROM,  Decreased strength,  Decreased functional use,  Increased pain   Short Term Goals:  ( 4 weeks)   1) Patient will be independent with HEP and modalities for pain management. (met)  2) Pt will exhibit increased passive flexion to 45 degrees.  (met)  3 Pt will report decreased pain to 2-3  out of 10 at worst. (met)  4) Pt will exhibit a Quick DASH score of 40-50%, evidencing improving functional use of L Upper extremity.       Long Term Goals (12-16 weeks)  1) Patient will be return to PLOF, including work and light gym workouts.  2) Pt will exhibit increased AROM  At 140-160 degrees  For flexion, abduction and WFL ER/IR to enable Reaching overhead shelves, washing hair/back, donning belt. (in progress)  3) Pt will exhibit 4/5 MMT for shoulder muscles to enable placing objects in overhead cabinets.   4) Pt will report decreased pain to 2 out of 10 with ADLs.  5) Pt will exhibit a Quick DASH score between  1-19 %, indicative of significant improvement in functional abilities.        Plan:  Continue OT POC. Initate AROM per protocol next visit.   Treatment to include Modalities for pain management (moist heat, cold packs, Ultrasound, iontophoresis with Dexamethasone, electrical stimulation),  Manual therapy/ Joint Mobilizations,Therapeutic exercises / activities, pt education, HEP,  as well as any other treatments deemed necessary in order to maximize painfree functional use of  L  UE.             I certify the need for these services furnished under this plan of treatment and while under my care.  GRAEME Puentes, [facility]      ____________________________________                          __________________  Physician/Referring Practitioner                                               Date of Signature

## 2018-02-26 ENCOUNTER — CLINICAL SUPPORT (OUTPATIENT)
Dept: REHABILITATION | Facility: HOSPITAL | Age: 53
End: 2018-02-26
Attending: ORTHOPAEDIC SURGERY
Payer: COMMERCIAL

## 2018-02-26 DIAGNOSIS — Z98.890 POST-OPERATIVE STATE: Primary | ICD-10-CM

## 2018-02-26 DIAGNOSIS — M75.42 IMPINGEMENT SYNDROME OF LEFT SHOULDER: ICD-10-CM

## 2018-02-26 PROCEDURE — 97110 THERAPEUTIC EXERCISES: CPT

## 2018-02-26 NOTE — PROGRESS NOTES
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     Visit 12 of 20; Expires 12/31/2017    Hand dominance: R  DOS: 12/6/17  S/P: 11  Weeks   5 days    Subjective: Pt reports soreness after session today.     Objective:  Range of Motion:      AROM      PROM  Shoulder Ext/flex  70/150 (+20/+2)  Nt/160 (+10)  Abduction 0/147 (+69)   0/130 (+40)  ER/IR at 0 degrees 72/62 (-8/-1)  80/70 (-10/+20)    Functional Limitations and Goal:   Self Care: Limited use of LUE for ADLs, bathing, dressing, overhead ADLS  Work/Activity : Limited use of LUE for driving, work tasks, laundry, housework,   Leisure:  Limited use of LUE for gym workouts       Treatment included :    Modalities: Moist Heat applied to L shoulder x 15 min to decrease pain and  increase blood flow and tissue elasticity prior to treatment.   Manual Therapy: (NT) GHJ mobs, inferior and posterior glides, with oscillations  There Ex: (45 min)    -Passive shoulder flexion, scaption, abduction, horiz add, and ER/IR at side, 45 and 90   -Serratus punches 3/10 3#   -Tricep press supine 3/10 2# (NT)   -Sidelying ER 3/10 with 3# (NT)   -Standing: flexion, abduction, scaption, ER/IR at side 2/10x ea with 2#   -V and Y overhead on wall 2/10 ea   -UBE 3 min ea direction 2.0 (NT)   -Pulley machine 10-20# rows, Ir, Er,ext    -Yellow theraband flexion, ext abd,horiz abd diagonals x 2 2/10 each    -PNF D1 and D2 and radial and ulnar thrust 1# 2/10x each   - press in scaption with 1# 2/10 (NT)   -Bicep curl 3  ways, 15ea, with 3#    -Yellow theraband ER with scaption to 90 2/10   -Wall push ups plus 2/10   -Bicep stretch     Assessment: AROm continues to improve. Progressed program with fatigue reported. Pt making great progress with little pain reported.     Problem List:    Healing Incision  Post operative edema  Decreased ROM,  Decreased strength,  Decreased functional use,  Increased pain   Short Term Goals:  ( 4 weeks)   1) Patient will be independent with HEP and modalities for pain management. (met)  2) Pt will exhibit increased passive flexion to 45 degrees.  (met)  3 Pt will report decreased pain to 2-3  out of 10 at worst. (met)  4) Pt will exhibit a Quick DASH score of 40-50%, evidencing improving functional use of L Upper extremity.       Long Term Goals (12-16 weeks)  1) Patient will be return to PLOF, including work and light gym workouts.  2) Pt will exhibit increased AROM  At 140-160 degrees  For flexion, abduction and WFL ER/IR to enable Reaching overhead shelves, washing hair/back, donning belt. (in progress)  3) Pt will exhibit 4/5 MMT for shoulder muscles to enable placing objects in overhead cabinets.   4) Pt will report decreased pain to 2 out of 10 with ADLs.  5) Pt will exhibit a Quick DASH score between  1-19 %, indicative of significant improvement in functional abilities.        Plan:  Continue OT POC.   Treatment to include Modalities for pain management (moist heat, cold packs, Ultrasound, iontophoresis with Dexamethasone, electrical stimulation),  Manual therapy/ Joint Mobilizations,Therapeutic exercises / activities, pt education, HEP,  as well as any other treatments deemed necessary in order to maximize painfree functional use of  L  UE.             I certify the need for these services furnished under this plan of treatment and while under my care.  GRAEME Puentes, [facility]      ____________________________________                         __________________  Physician/Referring  Practitioner                                               Date of Signature

## 2018-02-28 ENCOUNTER — CLINICAL SUPPORT (OUTPATIENT)
Dept: REHABILITATION | Facility: HOSPITAL | Age: 53
End: 2018-02-28
Attending: ORTHOPAEDIC SURGERY
Payer: COMMERCIAL

## 2018-02-28 DIAGNOSIS — M75.42 IMPINGEMENT SYNDROME OF LEFT SHOULDER: ICD-10-CM

## 2018-02-28 DIAGNOSIS — Z98.890 POST-OPERATIVE STATE: Primary | ICD-10-CM

## 2018-02-28 PROCEDURE — 97110 THERAPEUTIC EXERCISES: CPT

## 2018-02-28 NOTE — PROGRESS NOTES
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     Visit 13 of 20; Expires 12/31/2017    Hand dominance: R  DOS: 12/6/17  S/P: 12  Weeks   0 days    Subjective: Pt reports she was still a little sore this morning. She states she feels pinching when she carries something more than 5# in her arm.     Objective:  Range of Motion:      AROM      PROM  Shoulder Ext/flex  70/150 (+20/+2)  Nt/160 (+10)  Abduction 0/147 (+69)   0/130 (+40)  ER/IR at 0 degrees 72/62 (-8/-1)  80/70 (-10/+20)    Functional Limitations and Goal:   Self Care: Limited use of LUE for ADLs, bathing, dressing, overhead ADLS  Work/Activity : Limited use of LUE for driving, work tasks, laundry, housework,   Leisure:  Limited use of LUE for gym workouts       Treatment included :    Modalities: Moist Heat applied to L shoulder x 15 min to decrease pain and  increase blood flow and tissue elasticity prior to treatment.   Manual Therapy: (5) GHJ mobs, inferior and posterior glides, with oscillations; scapular mobs, oscillations to relax UT  There Ex: (450 min)    -Passive shoulder flexion, scaption, abduction, horiz add, and ER/IR at side, 45 and 90   -Serratus punches 3/10 3# (NT)   -Tricep press supine 3/10 2# (NT)   -Sidelying ER 3/10 with 3# (NT)   -Standing: flexion, abduction, scaption, ER/IR at side 2/10x ea with 1#   -V and Y overhead on wall 2/10 ea (NT)   -UBE 3 min ea direction 2.0 (NT)   -Pulley machine 10-20# rows, Ir, Er,ext  (NT)   -Yellow theraband flexion, ext abd,horiz  abd diagonals x 2 2/10 each (NT)   -PNF D1 and D2 and radial and ulnar thrust 1# 2/10x each (NT)   - press in scaption with 1# 2/10   -Bicep curl 3 ways, 15ea, with 3# (NT)   -Towel slides on wall with care to avoid UT substitution flexion, scaption, and abd 10 ea   -Yellow theraband ER with scaption to 90 2/10   -Wall push ups plus 2/10 (NT)   -Bicep stretch (NT)   -Pec stretch on doorway   -Towel stretch   -UT stretch    Assessment: Unable to reproduce pain with holding up to 8# weight by side. Discussed ways to reduce pain holding laundry basket.  Decreased resistive exercises today due to continued soreness and focused on stretching. Mild to moderate limitations noted with scapular mobs.       Problem List:    Healing Incision  Post operative edema  Decreased ROM,  Decreased strength,  Decreased functional use,  Increased pain   Short Term Goals:  ( 4 weeks)   1) Patient will be independent with HEP and modalities for pain management. (met)  2) Pt will exhibit increased passive flexion to 45 degrees.  (met)  3 Pt will report decreased pain to 2-3  out of 10 at worst. (met)  4) Pt will exhibit a Quick DASH score of 40-50%, evidencing improving functional use of L Upper extremity.       Long Term Goals (12-16 weeks)  1) Patient will be return to PLOF, including work and light gym workouts.  2) Pt will exhibit increased AROM  At 140-160 degrees  For flexion, abduction and WFL ER/IR to enable Reaching overhead shelves, washing hair/back, donning belt. (in progress)  3) Pt will exhibit 4/5 MMT for shoulder muscles to enable placing objects in overhead cabinets.   4) Pt will report decreased pain to 2 out of 10 with ADLs.  5) Pt will exhibit a Quick DASH score between  1-19 %, indicative of significant improvement in functional abilities.        Plan:  Continue OT POC.   Treatment to include Modalities for pain management (moist heat, cold packs, Ultrasound, iontophoresis with Dexamethasone, electrical  stimulation),  Manual therapy/ Joint Mobilizations,Therapeutic exercises / activities, pt education, HEP,  as well as any other treatments deemed necessary in order to maximize painfree functional use of  L  UE.             I certify the need for these services furnished under this plan of treatment and while under my care.  GRAEME Puentes, [facility]      ____________________________________                         __________________  Physician/Referring Practitioner                                               Date of Signature

## 2018-03-05 ENCOUNTER — CLINICAL SUPPORT (OUTPATIENT)
Dept: REHABILITATION | Facility: HOSPITAL | Age: 53
End: 2018-03-05
Attending: ORTHOPAEDIC SURGERY
Payer: COMMERCIAL

## 2018-03-05 DIAGNOSIS — M75.42 IMPINGEMENT SYNDROME OF LEFT SHOULDER: ICD-10-CM

## 2018-03-05 DIAGNOSIS — Z98.890 POST-OPERATIVE STATE: Primary | ICD-10-CM

## 2018-03-05 PROCEDURE — 97110 THERAPEUTIC EXERCISES: CPT

## 2018-03-05 NOTE — PROGRESS NOTES
"Occupational Therapy Shoulder Daily Treatment Note      Patient:  Rosalva Stauffer  Date of Visit: 3/5/2018  MRN: [id number]  Referring Physician:  John Orosco Sr., MD  Diagnosis: L Bankart repair, subacromial decompression, and distal clavicle excision  Referral Orders:   Eval and treat     Start Time: 8:55 am  End Time: 9:55 am           Occupation:  , all clerical work    Date of onset: 6-7 months  Mechanism of Injury:   Gradual onset of pain that increased to difficulty with ROM.     Visit 14 of 20; Expires 12/31/2017    Hand dominance: R  DOS: 12/6/17  S/P: 12  Weeks   0 days    Subjective: Pt reports she has been having less pain. "I was able to work out over the weekend." Pt reports she could not remember how to do the towel stretch correctly.  Objective:  Range of Motion:      AROM      PROM  Shoulder Ext/flex  62/148 (-9/-2)  Nt/160 (+10)  Abduction 0/145 (-2)   0/130 (+40)  ER/IR at 90 degrees 85/28 (+13/=)  80/70 (-10/+20)    Functional Limitations and Goal:   Self Care: Limited use of LUE for ADLs, bathing, dressing, overhead ADLS  Work/Activity : Limited use of LUE for driving, work tasks, laundry, housework,   Leisure:  Limited use of LUE for gym workouts       Treatment included : Reviewed towel stretch to ensure proper performance..    Modalities: Moist Heat applied to L shoulder x 15 min to decrease pain and  increase blood flow and tissue elasticity prior to treatment.   Manual Therapy: (5) GHJ mobs, inferior and posterior glides, with oscillations; scapular mobs, oscillations to relax UT  There Ex: (40 min)    -Passive shoulder flexion, scaption, abduction, horiz add, and ER/IR at side, 45 and 90   -Serratus punches 3/10 3#    -Tricep press supine 3/10 2# (NT)   -Sidelying ER 3/10 with 3#    -Standing: flexion, abduction, scaption, ER/IR at side 2/10x ea with 1# (NT)   -V and Y overhead on wall 2/10 ea (NT)   -UBE 3 min ea direction 2.0 (NT)   -Pulley machine 10-20# " rows, Ir, Er,ext  (NT)   -Yellow theraband horiz abd  2/10 each    -PNF D1 and D2 and radial and ulnar thrust 1# 2/10x each (NT)   - press in scaption with 1# 2/10   -Bicep curl 3 ways, 15ea, with 3# (NT)   -Towel slides on wall with care to avoid UT substitution flexion, scaption, and abd 10 ea (NT)   -Yellow theraband ER with scaption to 90 2/10   -Riivald exercises 1 set of 10 each side, yellow theraband.    -Wall push ups plus 2/10 (NT)   -Bicep stretch    -Pec stretch on doorway   -Towel stretch   -UT stretch (NT)    Assessment: Slight decrease in AROM measures today but pt reports less pain.        Problem List:    Healing Incision  Post operative edema  Decreased ROM,  Decreased strength,  Decreased functional use,  Increased pain   Short Term Goals:  ( 4 weeks)   1) Patient will be independent with HEP and modalities for pain management. (met)  2) Pt will exhibit increased passive flexion to 45 degrees.  (met)  3 Pt will report decreased pain to 2-3  out of 10 at worst. (met)  4) Pt will exhibit a Quick DASH score of 40-50%, evidencing improving functional use of L Upper extremity.       Long Term Goals (12-16 weeks)  1) Patient will be return to PLOF, including work and light gym workouts. (in progress)  2) Pt will exhibit increased AROM  At 140-160 degrees  For flexion, abduction and WFL ER/IR to enable Reaching overhead shelves, washing hair/back, donning belt. (met)  3) Pt will exhibit 4/5 MMT for shoulder muscles to enable placing objects in overhead cabinets.   4) Pt will report decreased pain to 2 out of 10 with ADLs.  5) Pt will exhibit a Quick DASH score between  1-19 %, indicative of significant improvement in functional abilities.        Plan:  Continue OT POC.   Treatment to include Modalities for pain management (moist heat, cold packs, Ultrasound, iontophoresis with Dexamethasone, electrical stimulation),  Manual therapy/ Joint Mobilizations,Therapeutic exercises / activities, pt  education, HEP,  as well as any other treatments deemed necessary in order to maximize painfree functional use of  L  UE.             I certify the need for these services furnished under this plan of treatment and while under my care.  GRAEME Puentes, SHET      ____________________________________                         __________________  Physician/Referring Practitioner                                               Date of Signature

## 2018-03-06 ENCOUNTER — OFFICE VISIT (OUTPATIENT)
Dept: ORTHOPEDICS | Facility: CLINIC | Age: 53
End: 2018-03-06
Payer: COMMERCIAL

## 2018-03-06 VITALS
HEIGHT: 66 IN | DIASTOLIC BLOOD PRESSURE: 66 MMHG | SYSTOLIC BLOOD PRESSURE: 112 MMHG | BODY MASS INDEX: 21.55 KG/M2 | HEART RATE: 68 BPM | WEIGHT: 134.06 LBS

## 2018-03-06 DIAGNOSIS — Z98.890 POSTOPERATIVE STATE: Primary | ICD-10-CM

## 2018-03-06 PROCEDURE — 99999 PR PBB SHADOW E&M-EST. PATIENT-LVL III: CPT | Mod: PBBFAC,,, | Performed by: PHYSICIAN ASSISTANT

## 2018-03-06 PROCEDURE — 99024 POSTOP FOLLOW-UP VISIT: CPT | Mod: S$GLB,,, | Performed by: PHYSICIAN ASSISTANT

## 2018-03-06 NOTE — PROGRESS NOTES
"SUBJECTIVE:  Patient is status post Left shoulder ATS .  Patient complains of   0/ 10 pain . Has returned to her normal activities.    Date of surgery: 2017- Left shoulder subacromial decompression, distal clavicle excision, extensive debridement,  Arthroscopic Bankart repair      Past Medical History:   Diagnosis Date    DDD (degenerative disc disease), cervical     Hyperlipidemia     Hypertension     Leiomyoma     Parkinson's disease     Polymenorrhea     Prediabetes     Seasonal allergies      Past Surgical History:   Procedure Laterality Date     SECTION, LOW TRANSVERSE      x1    GANGLION CYST EXCISION      HERNIA REPAIR      HYSTERECTOMY      Lysis of adhesions and enterotomy closure  2013    ROBOTIC ASSISTED HYSTERECTOMY  2013    With unilateral SO    Submaxillary gland drainage       Review of patient's allergies indicates:   Allergen Reactions    Allegra [fexofenadine] Palpitations    Allegra-d 12 hour [fexofenadine-pseudoephedrine] Palpitations     Current Outpatient Prescriptions on File Prior to Visit   Medication Sig Dispense Refill    hydroCHLOROthiazide (MICROZIDE) 12.5 mg capsule Take 1 capsule (12.5 mg total) by mouth once daily. 90 capsule 3    multivitamin capsule Take 1 capsule by mouth once daily.      pramipexole (MIRAPEX) 0.5 MG tablet Take 0.5 mg by mouth 3 (three) times daily.      rasagiline (AZILECT) 1 mg Tab Take 1 mg by mouth once daily.       No current facility-administered medications on file prior to visit.      /66   Pulse 68   Ht 5' 6" (1.676 m)   Wt 60.8 kg (134 lb 0.6 oz)   LMP 2013   BMI 21.63 kg/m²    ROS:  GENERAL: No fever, chills, fatigability or weight loss.  SKIN: No rashes, itching or changes in color or texture of skin.  HEAD: No headaches or recent head trauma.  EYES: Visual acuity fine. No photophobia, ocular pain or diplopia.  EARS: Denies ear pain, discharge or vertigo.  NOSE: No loss of smell, no " epistaxis or postnasal drip.  MOUTH & THROAT: No hoarseness or change in voice. No excessive gum bleeding.  NODES: Denies swollen glands.  CHEST: Denies NASH, cyanosis, wheezing, cough and sputum production.  CARDIOVASCULAR: Denies chest pain, PND, orthopnea or reduced exercise tolerance.  ABDOMEN: Appetite fine. No weight loss. Denies diarrhea, abdominal pain, hematemesis or blood in stool.  URINARY: No flank pain, dysuria or hematuria.  PERIPHERAL VASCULAR: No claudication or cyanosis.  NEUROLOGIC: No history of seizures, paralysis, alteration of gait or coordination.  MUSCULOSKELETAL: See HPI    PE:  APPEARANCE: Well nourished, well developed, in no acute distress.   HEAD: Normocephalic, atraumatic.  EYES: PERRL. EOMI.   EARS: TM's intact. Light reflex normal. No retraction or perforation.   NOSE: Mucosa pink. Airway clear.  MOUTH & THROAT: No tonsillar enlargement. No pharyngeal erythema or exudate. No stridor.  NECK: Supple.   NODES: No cervical, axillary or inguinal lymph node enlargement.  CHEST: Lungs clear to auscultation.  CARDIOVASCULAR: Normal S1, S2. No rubs, murmurs or gallops.  ABDOMEN: Bowel sounds normal. Not distended. Soft. No tenderness or masses.  NEUROLOGIC: Cranial Nerves: II-XII grossly intact, also see MUSCULOSKELETAL  MUSCULOSKELETAL:       Left Shoulder- , 2 plus radial artery and ulnar artery pulses, light touch intact Left upper extremity.  All digits are warm. No erythema, no warmth, no drainage, No swelling, no significant tenderness. Great ROM      ASSESSMENT:  The patient is stable and improving.      PLAN:  Follow up in 3 months  Slowly restart workouts

## 2018-03-26 ENCOUNTER — CLINICAL SUPPORT (OUTPATIENT)
Dept: REHABILITATION | Facility: HOSPITAL | Age: 53
End: 2018-03-26
Attending: ORTHOPAEDIC SURGERY
Payer: COMMERCIAL

## 2018-03-26 DIAGNOSIS — M75.42 IMPINGEMENT SYNDROME OF LEFT SHOULDER: ICD-10-CM

## 2018-03-26 DIAGNOSIS — Z98.890 POST-OPERATIVE STATE: Primary | ICD-10-CM

## 2018-03-26 PROCEDURE — 97110 THERAPEUTIC EXERCISES: CPT

## 2018-03-26 NOTE — PROGRESS NOTES
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     Visit 1 of 12; Expires 4/20/2018    Hand dominance: R  DOS: 12/6/17  S/P: 15  Weeks   5 days    Subjective: Pt reports lapse in tx due to awaiting insurance approval. Shoulder is feeling much better. She has been stretching chest and doing towel stretches.     Objective:  Range of Motion:      AROM      PROM  Shoulder Ext/flex  62/148 (-9/-2)  45/145 (-15)  Abduction 0/145 (-2)   0/150 (+20)  ER/IR at 90 degrees 85/28 (+13/=)  80/58 (=/-12)    Functional Limitations and Goal:   Self Care: Limited use of LUE for ADLs, bathing, dressing, overhead ADLS  Work/Activity : Limited use of LUE for driving, work tasks, laundry, housework,   Leisure:  Limited use of LUE for gym workouts       Treatment included :   Modalities: Moist Heat applied to L shoulder x 15 min to decrease pain and  increase blood flow and tissue elasticity prior to treatment.   Manual Therapy: (5) GHJ mobs, inferior and posterior glides, with oscillations; scapular mobs, oscillations to relax UT  There Ex: (40 min)    -Passive shoulder flexion, scaption, abduction, horiz add, and ER/IR at side, 45 and 90   -Standing: flexion, abduction, scaption, ER/IR at side 2/10x ea with 2-3#   -V and Y overhead on wall 2/10 ea (NT)   -UBE 3 min ea direction 2.0    -Pulley machine 10-20# rows, Ir, Er,ext, tricep push down,  2-3/10 each   -Yellow theraband horiz abd  2/10 each    -PNF D1 and D2 and radial and ulnar thrust 1# 2/10x each    -  press in scaption with 2# 2/10   -Bicep curl 3 ways, 15ea, with 3# (NT)   -Towel slides on wall with care to avoid UT substitution flexion, scaption, and abd 10 ea (NT)   -Yellow theraband ER with scaption to 90 2/10  (NT)   -Riivald exercises 1 set of 10 each side, yellow theraband. (NT)   -Wall push ups plus 2/10 (NT)   -Bicep stretch    -Pec stretch on doorway (NT)   -Towel stretch (NT)   -    Assessment:  Functional IR improving but remains tight. Tolerating increasing resistance levels with less pain and improving functional use.     Problem List:    Healing Incision  Post operative edema  Decreased ROM,  Decreased strength,  Decreased functional use,  Increased pain   Short Term Goals:  ( 4 weeks)   1) Patient will be independent with HEP and modalities for pain management. (met)  2) Pt will exhibit increased passive flexion to 45 degrees.  (met)  3 Pt will report decreased pain to 2-3  out of 10 at worst. (met)  4) Pt will exhibit a Quick DASH score of 40-50%, evidencing improving functional use of L Upper extremity.       Long Term Goals (12-16 weeks)  1) Patient will be return to PLOF, including work and light gym workouts. (in progress)  2) Pt will exhibit increased AROM  At 140-160 degrees  For flexion, abduction and WFL ER/IR to enable Reaching overhead shelves, washing hair/back, donning belt. (met)  3) Pt will exhibit 4/5 MMT for shoulder muscles to enable placing objects in overhead cabinets.   4) Pt will report decreased pain to 2 out of 10 with ADLs.  5) Pt will exhibit a Quick DASH score between  1-19 %, indicative of significant improvement in functional abilities.        Plan:  Continue OT POC.   Treatment to include Modalities for pain management (moist heat, cold packs, Ultrasound, iontophoresis with Dexamethasone, electrical stimulation),  Manual therapy/ Joint Mobilizations,Therapeutic exercises / activities, pt education, HEP,  as well as any other treatments deemed necessary in order to  maximize painfree functional use of  L  UE.             I certify the need for these services furnished under this plan of treatment and while under my care.  GRAEME Puentes, DHAVAL      ____________________________________                         __________________  Physician/Referring Practitioner                                               Date of Signature

## 2018-03-27 NOTE — PROGRESS NOTES
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     Visit 2 of 12; Expires 4/20/2018    Hand dominance: R  DOS: 12/6/17  S/P: 16  Weeks   0 days    Subjective:  Pt reports mild soreness after last session    Objective:  Range of Motion:      AROM      PROM                     MMT  Shoulder Ext/flex  62/148 (-9/-2)  45/145 (-15)             Flex 4/5  Abduction 0/145 (-2)   0/150 (+20)  abd 4/5  ER/IR at 90 degrees 85/28 (+13/=)  80/58 (=/-12)  ER 3+/5  IR 4/5    Functional Limitations and Goal:   Self Care: Limited use of LUE for ADLs, bathing, dressing, overhead ADLS  Work/Activity : Limited use of LUE for driving, work tasks, laundry, housework,   Leisure:  Limited use of LUE for gym workouts       Treatment included :   Modalities: Moist Heat applied to L shoulder x 10 min to decrease pain and  increase blood flow and tissue elasticity prior to treatment.   Manual Therapy: (3) GHJ mobs, inferior and posterior glides, with oscillations; s  There Ex: (45 min)    -Passive shoulder flexion, scaption, abduction, horiz add, and ER/IR at side, 45 and 90   -Standing: flexion, abduction, scaption, ER/IR at side 2/10x ea with 2-3# (NT)   -V and Y overhead on wall 2/10 ea (NT)   -UBE 5 min ea direction 2.0    -Pulley machine 10-20# rows, Ir, Er,ext, tricep push down, flexion, abd, scaption 2-3/10 each   -Yellow theraband horiz abd  2/10 each (NT)   -PNF D1 and D2 and radial and ulnar thrust 1# 2/10x each    - press in scaption with 2# 2/10   -Bicep  curl 3 ways, 15ea, with 3# (NT)   -Yellow theraband ER with scaption to 90 2/10  (NT)   -Riivald exercises 1 set of 10 each side, yellow theraband. (NT)   -Wall push ups plus 2/10 (NT)   -Bicep stretch    -Wall ladder  5 reps for flexion stretch  Assessment:  Continues to progress make good progress. Exhibits good strength.     Problem List:    Healing Incision  Post operative edema  Decreased ROM,  Decreased strength,  Decreased functional use,  Increased pain   Short Term Goals:  ( 4 weeks)   1) Patient will be independent with HEP and modalities for pain management. (met)  2) Pt will exhibit increased passive flexion to 45 degrees.  (met)  3 Pt will report decreased pain to 2-3  out of 10 at worst. (met)  4) Pt will exhibit a Quick DASH score of 40-50%, evidencing improving functional use of L Upper extremity.       Long Term Goals (12-16 weeks)  1) Patient will be return to PLOF, including work and light gym workouts. (in progress)  2) Pt will exhibit increased AROM  At 140-160 degrees  For flexion, abduction and WFL ER/IR to enable Reaching overhead shelves, washing hair/back, donning belt. (met)  3) Pt will exhibit 4/5 MMT for shoulder muscles to enable placing objects in overhead cabinets.   4) Pt will report decreased pain to 2 out of 10 with ADLs.  5) Pt will exhibit a Quick DASH score between  1-19 %, indicative of significant improvement in functional abilities.        Plan:  Continue OT POC.   Treatment to include Modalities for pain management (moist heat, cold packs, Ultrasound, iontophoresis with Dexamethasone, electrical stimulation),  Manual therapy/ Joint Mobilizations,Therapeutic exercises / activities, pt education, HEP,  as well as any other treatments deemed necessary in order to maximize painfree functional use of  L  UE.             I certify the need for these services furnished under this plan of treatment and while under my care.  GRAEME Puentes,  CHT      ____________________________________                         __________________  Physician/Referring Practitioner                                               Date of Signature

## 2018-03-28 ENCOUNTER — CLINICAL SUPPORT (OUTPATIENT)
Dept: REHABILITATION | Facility: HOSPITAL | Age: 53
End: 2018-03-28
Attending: ORTHOPAEDIC SURGERY
Payer: COMMERCIAL

## 2018-03-28 DIAGNOSIS — M75.42 IMPINGEMENT SYNDROME OF LEFT SHOULDER: ICD-10-CM

## 2018-03-28 DIAGNOSIS — Z98.890 POST-OPERATIVE STATE: Primary | ICD-10-CM

## 2018-03-28 PROCEDURE — 97110 THERAPEUTIC EXERCISES: CPT

## 2018-03-29 NOTE — PROGRESS NOTES
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     Visit 3 of 12; Expires 4/20/2018    Hand dominance: R  DOS: 12/6/17  S/P: 17  Weeks   0 days    Subjective:  Pt reports some soreness after last session.       Objective:  Range of Motion:      AROM      PROM                     MMT  Shoulder Ext/flex  62/148 (-9/-2)  45/145 (-15)             Flex 4/5  Abduction 0/145 (-2)   0/150 (+20)  abd 4/5  ER/IR at 90 degrees 85/28 (+13/=)  80/58 (=/-12)  ER 3+/5  IR 4/5    Functional Limitations and Goal:   Self Care: Limited use of LUE for ADLs, bathing, dressing, overhead ADLS  Work/Activity : Limited use of LUE for driving, work tasks, laundry, housework,   Leisure:  Limited use of LUE for gym workouts       Treatment included :   Modalities: Moist Heat applied to L shoulder x 15 min to decrease pain and  increase blood flow and tissue elasticity prior to treatment.   Manual Therapy: (1) GHJ mobs, inferior and posterior glides, with oscillations; s  There Ex: (44 min)    -Passive shoulder flexion, scaption, abduction, horiz add, and ER/IR at side, 45 and 90   -Standing: flexion, abduction, scaption, ER/IR at side 2/10x ea with 2-3# (NT)   -V and Y overhead on wall 2/10 ea    -UBE 3 min ea direction 2.0    -Pulley machine 10-20# rows, Ir, Er,ext, tricep push down,    -Flexion, abd, scaption 2/10 each 3#   -Yellow theraband horiz abd  2/10 each (NT)   -PNF D1 and D2 and radial and ulnar thrust 3#, 2# 2/10x each    - press in scaption with 2# 2/10  (NT)   -Bicep curl 3 ways, 15ea, with 3#    -Red theraband ER with scaption to 90 2/10     -Riivald exercises 1 set of 10 each side, red theraband.    -Wall push ups plus 2/10 (NT)   -Bicep stretch    -Wall ladder  5 reps for flexion stretch (NT)    Assessment:   Pt doing well; tolerating increasing resistance levels with mild to mod soreness reported afterwards.     Problem List:    Healing Incision  Post operative edema  Decreased ROM,  Decreased strength,  Decreased functional use,  Increased pain   Short Term Goals:  ( 4 weeks)   1) Patient will be independent with HEP and modalities for pain management. (met)  2) Pt will exhibit increased passive flexion to 45 degrees.  (met)  3 Pt will report decreased pain to 2-3  out of 10 at worst. (met)  4) Pt will exhibit a Quick DASH score of 40-50%, evidencing improving functional use of L Upper extremity.       Long Term Goals (12-16 weeks)  1) Patient will be return to PLOF, including work and light gym workouts. (in progress)  2) Pt will exhibit increased AROM  At 140-160 degrees  For flexion, abduction and WFL ER/IR to enable Reaching overhead shelves, washing hair/back, donning belt. (met)  3) Pt will exhibit 4/5 MMT for shoulder muscles to enable placing objects in overhead cabinets.   4) Pt will report decreased pain to 2 out of 10 with ADLs.  5) Pt will exhibit a Quick DASH score between  1-19 %, indicative of significant improvement in functional abilities.        Plan:  Continue OT POC.   Treatment to include Modalities for pain management (moist heat, cold packs, Ultrasound, iontophoresis with Dexamethasone, electrical stimulation),  Manual therapy/ Joint Mobilizations,Therapeutic exercises / activities, pt education, HEP,  as well as any other treatments deemed necessary in order to maximize painfree functional use of  L  UE.             I certify the need for these services furnished under this plan of treatment and while under my care.  Rocio Hurtado,  LOTR, CHT      ____________________________________                         __________________  Physician/Referring Practitioner                                               Date of Signature

## 2018-04-02 ENCOUNTER — CLINICAL SUPPORT (OUTPATIENT)
Dept: REHABILITATION | Facility: HOSPITAL | Age: 53
End: 2018-04-02
Attending: ORTHOPAEDIC SURGERY
Payer: OTHER GOVERNMENT

## 2018-04-02 DIAGNOSIS — Z98.890 POST-OPERATIVE STATE: Primary | ICD-10-CM

## 2018-04-02 DIAGNOSIS — M75.42 IMPINGEMENT SYNDROME OF LEFT SHOULDER: ICD-10-CM

## 2018-04-02 PROCEDURE — 97110 THERAPEUTIC EXERCISES: CPT

## 2018-04-04 ENCOUNTER — CLINICAL SUPPORT (OUTPATIENT)
Dept: REHABILITATION | Facility: HOSPITAL | Age: 53
End: 2018-04-04
Attending: ORTHOPAEDIC SURGERY
Payer: COMMERCIAL

## 2018-04-04 ENCOUNTER — PATIENT MESSAGE (OUTPATIENT)
Dept: FAMILY MEDICINE | Facility: CLINIC | Age: 53
End: 2018-04-04

## 2018-04-04 DIAGNOSIS — Z98.890 POST-OPERATIVE STATE: Primary | ICD-10-CM

## 2018-04-04 DIAGNOSIS — M75.42 IMPINGEMENT SYNDROME OF LEFT SHOULDER: ICD-10-CM

## 2018-04-04 PROCEDURE — 97110 THERAPEUTIC EXERCISES: CPT

## 2018-04-04 NOTE — PROGRESS NOTES
Occupational Therapy Shoulder Daily Treatment Note      Patient:  Rosalva Stauffer  Date of Visit: 4/4/2018  MRN: [id number]  Referring Physician:  John Orosco Sr., MD  Diagnosis: L Bankart repair, subacromial decompression, and distal clavicle excision  Referral Orders:   Eval and treat     Start Time: 8:55 am  End Time: 9:55 am           Occupation:  , all clerical work    Date of onset: 6-7 months  Mechanism of Injury:   Gradual onset of pain that increased to difficulty with ROM.     Visit 4 of 12; Expires 4/20/2018    Hand dominance: R  DOS: 12/6/17  S/P: 17  Weeks   0 days    Subjective:  No new complaints.     Objective:  Range of Motion:      AROM      PROM                  MMT  Shoulder Ext/flex  65/155 (+3/+7)  45/160              Flex 4/5  Abduction 0/155 (+10)   0/165  abd 4/5  ER/IR at 90 degrees 85/62 (-10/+34)  85/65   ER 3+/5  IR 4/5    Functional Limitations and Goal:   Self Care: Limited use of LUE for ADLs, bathing, dressing, overhead ADLS  Work/Activity : Limited use of LUE for driving, work tasks, laundry, housework,   Leisure:  Limited use of LUE for gym workouts       Treatment included :   Modalities: Moist Heat applied to L shoulder x 10 min to decrease pain and  increase blood flow and tissue elasticity prior to treatment.   Manual Therapy: (3) GHJ mobs, inferior and posterior glides, with oscillations; scapular mobs  There Ex: (45 min)    -Passive shoulder flexion, scaption, abduction, horiz add/abd, and ER/IR at side, 45 and 90   -V and Y overhead on wall 2/10 ea (NT)   -UBE 3 min ea direction 2.0    -Pulley machine 10-20# rows, Ir, Er,ext, tricep push down,    -Flexion, abd, scaption 2/10 each 3#   -Yellow theraband horiz abd  2/10 each (NT)    -PNF D1 and D2 and radial and ulnar thrust 3#, 2# 2/10x each    - press in scaption with 2# 2/10 (NT)   -Bicep curl 3 ways, 15ea, with 3#    -Red theraband ER with scaption to 90 2/10  (NT)   -Riivald  exercises 1 set of 10 each side, red theraband.    -Wall push ups plus 2/10    -Bicep stretch    -Wall ladder  5 reps for flexion stretch     Assessment:   AROM/PROM improved for shoulder. Continues with fatigue post tx.     Problem List:    Healing Incision  Post operative edema  Decreased ROM,  Decreased strength,  Decreased functional use,  Increased pain   Short Term Goals:  ( 4 weeks)   1) Patient will be independent with HEP and modalities for pain management. (met)  2) Pt will exhibit increased passive flexion to 45 degrees.  (met)  3 Pt will report decreased pain to 2-3  out of 10 at worst. (met)  4) Pt will exhibit a Quick DASH score of 40-50%, evidencing improving functional use of L Upper extremity.       Long Term Goals (12-16 weeks)  1) Patient will be return to PLOF, including work and light gym workouts. (in progress)  2) Pt will exhibit increased AROM  At 140-160 degrees  For flexion, abduction and WFL ER/IR to enable Reaching overhead shelves, washing hair/back, donning belt. (met)  3) Pt will exhibit 4/5 MMT for shoulder muscles to enable placing objects in overhead cabinets.   4) Pt will report decreased pain to 2 out of 10 with ADLs.  5) Pt will exhibit a Quick DASH score between  1-19 %, indicative of significant improvement in functional abilities.        Plan:  Continue OT POC.   Treatment to include Modalities for pain management (moist heat, cold packs, Ultrasound, iontophoresis with Dexamethasone, electrical stimulation),  Manual therapy/ Joint Mobilizations,Therapeutic exercises / activities, pt education, HEP,  as well as any other treatments deemed necessary in order to maximize painfree functional use of  L  UE.             I certify the need for these services furnished under this plan of treatment and while under my care.  GRAEME Puentes, [facility]      ____________________________________                         __________________  Physician/Referring Practitioner                                                Date of Signature

## 2018-04-05 NOTE — PROGRESS NOTES
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     Visit 5 of 12; Expires 4/20/2018    Hand dominance: R  DOS: 12/6/17  S/P: 17  Weeks   5 days    Subjective:  Pt reports she feels she should "push through" to get stronger.   Objective:  Range of Motion:      AROM      PROM                  MMT  Shoulder Ext/flex  65/155 (+3/+7)  45/170              Flex 4+/5  Abduction 0/155 (+10)   0/170  abd 4+/5  ER/IR at 90 degrees 85/62 (-10/+34)  85/75   ER 4/5  IR 4+/5    Functional Limitations and Goal:   Self Care: Limited use of LUE for ADLs, bathing, dressing, overhead ADLS  Work/Activity : Limited use of LUE for driving, work tasks, laundry, housework,   Leisure:  Limited use of LUE for gym workouts       Treatment included :   Modalities: Moist Heat applied to L shoulder x 15 min to decrease pain and  increase blood flow and tissue elasticity prior to treatment.   Manual Therapy: (3) GHJ mobs, inferior and posterior glides, with oscillations; scapular mobs  There Ex: (42 min)    -Passive shoulder flexion, scaption, abduction, horiz add/abd, and ER/IR at side, 45 and 90   -V and Y overhead on wall 2/10 ea (NT)   -UBE 3 min ea direction 2.0    -Pulley machine 10-20# rows, Ir, Er,ext, tricep push down,    -Flexion, abd, scaption 2/10 each 3# (NT)   -Yellow theraband horiz abd  2/10 each (NT)    -PNF D1 and D2 flexion and extension 10# pulleys, 2/10x each    - press in scaption with 2# 2/10 (NT)   -Bicep curl 3 ways, 15ea, with 3#  (NT)   -Red " theraband ER with scaption to 90 2/10  (NT)   -Riivald exercises 1 set of 10 each side, red theraband.    -Wall push ups plus 2/10    -Bicep stretch    -Wall ladder  5 reps for flexion stretch    -Bodyblade 3 min; L hand and B hands    Assessment:   Excellent PROM today with passive stretch.  MMT also improved. Pt fatigued post treatment.     Problem List:    Decreased ROM,  Decreased strength,  Decreased functional use,  Increased pain   Short Term Goals:  ( 4 weeks)   1) Patient will be independent with HEP and modalities for pain management. (met)  2) Pt will exhibit increased passive flexion to 45 degrees.  (met)  3 Pt will report decreased pain to 2-3  out of 10 at worst. (met)  4) Pt will exhibit a Quick DASH score of 40-50%, evidencing improving functional use of L Upper extremity.       Long Term Goals (12-16 weeks)  1) Patient will be return to PLOF, including work and light gym workouts. (in progress)  2) Pt will exhibit increased AROM  At 140-160 degrees  For flexion, abduction and WFL ER/IR to enable Reaching overhead shelves, washing hair/back, donning belt. (met)  3) Pt will exhibit 4/5 MMT for shoulder muscles to enable placing objects in overhead cabinets. (met)  4) Pt will report decreased pain to 2 out of 10 with ADLs.  5) Pt will exhibit a Quick DASH score between  1-19 %, indicative of significant improvement in functional abilities.        Plan:  Continue OT POC.   Treatment to include Modalities for pain management (moist heat, cold packs, Ultrasound, iontophoresis with Dexamethasone, electrical stimulation),  Manual therapy/ Joint Mobilizations,Therapeutic exercises / activities, pt education, HEP,  as well as any other treatments deemed necessary in order to maximize painfree functional use of  L  UE.             I certify the need for these services furnished under this plan of treatment and while under my care.  GRAEME Puentes, [facility]      ____________________________________                          __________________  Physician/Referring Practitioner                                               Date of Signature

## 2018-04-06 ENCOUNTER — TELEPHONE (OUTPATIENT)
Dept: FAMILY MEDICINE | Facility: CLINIC | Age: 53
End: 2018-04-06

## 2018-04-06 NOTE — TELEPHONE ENCOUNTER
Spoke with pt and informed her that it will be at least 7 to 10 days for forms to be filled out and pt states that she needs forms for her adoption class tomorrow.

## 2018-04-06 NOTE — TELEPHONE ENCOUNTER
----- Message from Iesha Godinez sent at 4/6/2018  9:23 AM CDT -----  Contact: pt  Pt states she's calling to see if her paper work has been completed, she can be reached at [phone] Thanks

## 2018-04-09 ENCOUNTER — CLINICAL SUPPORT (OUTPATIENT)
Dept: REHABILITATION | Facility: HOSPITAL | Age: 53
End: 2018-04-09
Attending: ORTHOPAEDIC SURGERY
Payer: COMMERCIAL

## 2018-04-09 DIAGNOSIS — M75.42 IMPINGEMENT SYNDROME OF LEFT SHOULDER: ICD-10-CM

## 2018-04-09 DIAGNOSIS — Z98.890 POST-OPERATIVE STATE: Primary | ICD-10-CM

## 2018-04-09 PROCEDURE — 97110 THERAPEUTIC EXERCISES: CPT

## 2018-04-11 ENCOUNTER — CLINICAL SUPPORT (OUTPATIENT)
Dept: REHABILITATION | Facility: HOSPITAL | Age: 53
End: 2018-04-11
Attending: ORTHOPAEDIC SURGERY
Payer: OTHER GOVERNMENT

## 2018-04-11 DIAGNOSIS — M75.42 IMPINGEMENT SYNDROME OF LEFT SHOULDER: ICD-10-CM

## 2018-04-11 DIAGNOSIS — Z98.890 POST-OPERATIVE STATE: Primary | ICD-10-CM

## 2018-04-11 PROCEDURE — 97110 THERAPEUTIC EXERCISES: CPT

## 2018-04-11 NOTE — PROGRESS NOTES
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     Visit 6 of 12; Expires 4/20/2018    Hand dominance: R  DOS: 12/6/17  S/P: 18  Weeks   0 days    Subjective:  Pt reports she would like to focus on stretching today as she is still slightly sore from last visit and also lifted some items last night.   Objective:  Range of Motion:      AROM      PROM                  MMT  Shoulder Ext/flex  65/155 (+3/+7)  45/170              Flex 4+/5  Abduction 0/155 (+10)   0/170  abd 4+/5  ER/IR at 90 degrees 85/62 (-10/+34)  85/75   ER 4/5  IR 4+/5    Functional Limitations and Goal:   Self Care: Limited use of LUE for ADLs, bathing, dressing, overhead ADLS  Work/Activity : Limited use of LUE for driving, work tasks, laundry, housework,   Leisure:  Limited use of LUE for gym workouts       Treatment included :   Modalities: Moist Heat applied to L shoulder x 10 min to decrease pain and  increase blood flow and tissue elasticity prior to treatment.   Manual Therapy: (3) GHJ mobs, inferior and posterior glides, with oscillations; scapular mobs  There Ex: (38 min)    -Passive shoulder flexion, scaption, abduction, horiz add/abd, extension and ER/IR at side, 45 and 90   -V and Y overhead on wall 2/10 ea    -UBE 3 min ea direction 2.0    -Pulley machine 10-20# rows, Ir, Er,ext, tricep push down, (NT)   -Flexion, abd, scaption 2/10 each 3# (NT)   -Yellow theraband horiz abd  2/10 each (NT)    -PNF D1 and D2 flexion and extension 10# pulleys, 2/10x each (NT)   -  press in scaption with 2# 2/10 (NT)   -Bicep curl 3 ways, 15ea, with 3#  (NT)   -Red theraband ER with scaption to 90 2/10  (NT)   -Riivald exercises 1 set of 10 each side, red theraband.  (NT)   -Wall push ups plus 2/10 (nt)   -Bicep stretch    -Wall ladder  5 reps for flexion stretch    -Bodyblade 3 min; L hand and B hands (NT)   -Red theraband diagonals 1/10    Assessment:   Continues to make good progress with excellent PROM with mild end range stiffness remaining and improving strength.     Problem List:    Decreased ROM,  Decreased strength,  Decreased functional use,  Increased pain   Short Term Goals:  ( 4 weeks)   1) Patient will be independent with HEP and modalities for pain management. (met)  2) Pt will exhibit increased passive flexion to 45 degrees.  (met)  3 Pt will report decreased pain to 2-3  out of 10 at worst. (met)  4) Pt will exhibit a Quick DASH score of 40-50%, evidencing improving functional use of L Upper extremity.       Long Term Goals (12-16 weeks)  1) Patient will be return to PLOF, including work and light gym workouts. (in progress)  2) Pt will exhibit increased AROM  At 140-160 degrees  For flexion, abduction and WFL ER/IR to enable Reaching overhead shelves, washing hair/back, donning belt. (met)  3) Pt will exhibit 4/5 MMT for shoulder muscles to enable placing objects in overhead cabinets. (met)  4) Pt will report decreased pain to 2 out of 10 with ADLs.  5) Pt will exhibit a Quick DASH score between  1-19 %, indicative of significant improvement in functional abilities.        Plan:  Continue OT POC.   Treatment to include Modalities for pain management (moist heat, cold packs, Ultrasound, iontophoresis with Dexamethasone, electrical stimulation),  Manual therapy/ Joint Mobilizations,Therapeutic exercises / activities, pt education, HEP,  as well as any other treatments deemed necessary in order to maximize painfree functional use of  L  UE.             I certify the need for  these services furnished under this plan of treatment and while under my care.  GRAEME Puentes, [facility]      ____________________________________                         __________________  Physician/Referring Practitioner                                               Date of Signature

## 2018-04-12 ENCOUNTER — PATIENT MESSAGE (OUTPATIENT)
Dept: FAMILY MEDICINE | Facility: CLINIC | Age: 53
End: 2018-04-12

## 2018-04-13 ENCOUNTER — TELEPHONE (OUTPATIENT)
Dept: FAMILY MEDICINE | Facility: CLINIC | Age: 53
End: 2018-04-13

## 2018-04-13 NOTE — TELEPHONE ENCOUNTER
----- Message from Zahida Montes sent at 4/13/2018  4:10 PM CDT -----  Pt at [phone]//states is calling regarding paperwork she sent to your office//please call//thanks/lh

## 2018-04-16 ENCOUNTER — CLINICAL SUPPORT (OUTPATIENT)
Dept: REHABILITATION | Facility: HOSPITAL | Age: 53
End: 2018-04-16
Attending: ORTHOPAEDIC SURGERY
Payer: OTHER GOVERNMENT

## 2018-04-16 DIAGNOSIS — Z98.890 POST-OPERATIVE STATE: Primary | ICD-10-CM

## 2018-04-16 DIAGNOSIS — M75.42 IMPINGEMENT SYNDROME OF LEFT SHOULDER: ICD-10-CM

## 2018-04-16 PROCEDURE — 97110 THERAPEUTIC EXERCISES: CPT

## 2018-04-16 NOTE — PROGRESS NOTES
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     Visit 7 of 12; Expires 4/20/2018    Hand dominance: R  DOS: 12/6/17  S/P: 19 Weeks   0 days    Subjective:  Pt reports her shoulder is feeling good today. Going on vacation for  A week.   Objective:  Range of Motion:      AROM      PROM                  MMT  Shoulder Ext/flex  58/140 (-8/-15)  45/170              Flex 4+/5  Abduction 0/155 (=)   0/170  abd 4+/5  ER/IR at 90 degrees 90/60 (+5/-2)  85/75   ER 4/5  IR 4+/5    Functional Limitations and Goal:   Self Care: Limited use of LUE for ADLs, bathing, dressing, overhead ADLS  Work/Activity : Limited use of LUE for driving, work tasks, laundry, housework,   Leisure:  Limited use of LUE for gym workouts       Treatment included :   Modalities: Moist Heat applied to L shoulder x 10 min to decrease pain and  increase blood flow and tissue elasticity prior to treatment.   Manual Therapy: (3) GHJ mobs, inferior and posterior glides, with oscillations; scapular mobs  There Ex: (39 min)    -Passive shoulder flexion, scaption, abduction, horiz add/abd, extension and ER/IR at side, 45 and 90   -V and Y overhead on wall 2/10 ea    -UBE 3 min ea direction 2.0    -Pulley machine 10-20# rows, Ir, Er,ext, tricep push down,    -Flexion, abd, scaption 2/10 each 3#    -Yellow theraband horiz abd  2/10 each (NT)   -PNF D1 and D2 flexion and extension 10# pulleys, 2/10x each    - press in scaption with 2# 2/10 (NT)   -Bicep curl 3 ways, 15ea, with 3#      -Red theraband ER with scaption to 90 2/10     -Riivald exercises 1 set of 10 each side, red theraband.     -Wall push ups plus 2/10 (nt)   -Bicep stretch    -Wall ladder  5 reps for flexion stretch (NT)   -Bodyblade 3 min; L hand and B hands (NT)   -Red theraband diagonals 1/10    Assessment:   Small decreases in AROM this week.     Problem List:    Decreased ROM,  Decreased strength,  Decreased functional use,  Increased pain   Short Term Goals:  ( 4 weeks)   1) Patient will be independent with HEP and modalities for pain management. (met)  2) Pt will exhibit increased passive flexion to 45 degrees.  (met)  3 Pt will report decreased pain to 2-3  out of 10 at worst. (met)  4) Pt will exhibit a Quick DASH score of 40-50%, evidencing improving functional use of L Upper extremity.       Long Term Goals (12-16 weeks)  1) Patient will be return to PLOF, including work and light gym workouts. (in progress)  2) Pt will exhibit increased AROM  At 140-160 degrees  For flexion, abduction and WFL ER/IR to enable Reaching overhead shelves, washing hair/back, donning belt. (met)  3) Pt will exhibit 4/5 MMT for shoulder muscles to enable placing objects in overhead cabinets. (met)  4) Pt will report decreased pain to 2 out of 10 with ADLs.  5) Pt will exhibit a Quick DASH score between  1-19 %, indicative of significant improvement in functional abilities.        Plan:  Continue OT POC.   Treatment to include Modalities for pain management (moist heat, cold packs, Ultrasound, iontophoresis with Dexamethasone, electrical stimulation),  Manual therapy/ Joint Mobilizations,Therapeutic exercises / activities, pt education, HEP,  as well as any other treatments deemed necessary in order to maximize painfree functional use of  L  UE.             I certify the need for these services furnished under this plan of treatment and while under my care.  GRAEME Puentes, [facility]      ____________________________________                          __________________  Physician/Referring Practitioner                                               Date of Signature

## 2018-04-17 NOTE — TELEPHONE ENCOUNTER
Pt states the handicapp form that was given to her was for a temporary tag  She wants to know if she can get a permanent instead

## 2018-04-17 NOTE — TELEPHONE ENCOUNTER
----- Message from Savanah Shafer sent at 4/17/2018  7:05 AM CDT -----  Contact: Pt  She is calling in regards to wanting to speak to the nurse concerning a medical disability and would like a callback at .[phone] (home)

## 2018-04-26 RX ORDER — HYDROCHLOROTHIAZIDE 12.5 MG/1
12.5 CAPSULE ORAL DAILY
Qty: 90 CAPSULE | Refills: 1 | Status: SHIPPED | OUTPATIENT
Start: 2018-04-26 | End: 2019-03-09 | Stop reason: SDUPTHER

## 2018-05-02 ENCOUNTER — CLINICAL SUPPORT (OUTPATIENT)
Dept: REHABILITATION | Facility: HOSPITAL | Age: 53
End: 2018-05-02
Attending: ORTHOPAEDIC SURGERY
Payer: COMMERCIAL

## 2018-05-02 DIAGNOSIS — M75.42 IMPINGEMENT SYNDROME OF LEFT SHOULDER: ICD-10-CM

## 2018-05-02 DIAGNOSIS — Z98.890 POST-OPERATIVE STATE: Primary | ICD-10-CM

## 2018-05-02 PROCEDURE — 97110 THERAPEUTIC EXERCISES: CPT

## 2018-05-02 NOTE — PROGRESS NOTES
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     Visit 8 of 12; Expires 4/20/2018    Hand dominance: R  DOS: 12/6/17  S/P: 21 Weeks   0 days    Subjective:  Pt reports she has been lifting free weights at her home gym, approx 20# disbursed between both arms, without pain or difficulty.    Objective:  Range of Motion:      AROM      PROM                  MMT  Shoulder Ext/flex  70/150 (+12/+10)  45/170              Flex 4+/5  Abduction 0/148 (-7)   0/170  abd 4+/5  ER/IR at 90 degrees 75/70 (-15/+10)  85/75   ER 4/5  IR 4+/5    Functional Limitations and Goal:   Self Care: Limited use of LUE for ADLs, bathing, dressing, overhead ADLS  Work/Activity : Limited use of LUE for driving, work tasks, laundry, housework,   Leisure:  Limited use of LUE for gym workouts       Treatment included :   Modalities: Moist Heat applied to L shoulder x 15 min to decrease pain and  increase blood flow and tissue elasticity prior to treatment.   Manual Therapy: (3) GHJ mobs, inferior and posterior glides, with oscillations; scapular mobs  There Ex: (42 min)    -Passive shoulder flexion, scaption, abduction, horiz add/abd, extension and ER/IR at side, 45 and 90   -V and Y overhead on wall 2/10 ea    -UBE 3 min ea direction 2.0    -Pulley machine 10-20# rows, Ir, Er,ext, 2-3/10 each    -Flexion, abd, scaption 2/10 each 3#    -Yellow theraband horiz abd  2/10 each (NT)   -PNF D1 and D2 flexion and extension 3# 2/10x each    - press in scaption with 2# 2/10     -Bicep curl 3 ways, 15ea, with 3#     -Red theraband ER with scaption to 90 2/10     -Riivald exercises 1 set of 10 each side, red theraband.     -Bicep stretch    -Wall ladder  5 reps for flexion stretch (NT)   -Red theraband diagonals 1/10    Assessment:  AROM continues to improve for ext, flexion, and IR this date.      Problem List:    Decreased ROM,  Decreased strength,  Decreased functional use,  Increased pain   Short Term Goals:  ( 4 weeks)   1) Patient will be independent with HEP and modalities for pain management. (met)  2) Pt will exhibit increased passive flexion to 45 degrees.  (met)  3 Pt will report decreased pain to 2-3  out of 10 at worst. (met)  4) Pt will exhibit a Quick DASH score of 40-50%, evidencing improving functional use of L Upper extremity.       Long Term Goals (12-16 weeks)  1) Patient will be return to PLOF, including work and light gym workouts. (in progress)  2) Pt will exhibit increased AROM  At 140-160 degrees  For flexion, abduction and WFL ER/IR to enable Reaching overhead shelves, washing hair/back, donning belt. (met)  3) Pt will exhibit 4/5 MMT for shoulder muscles to enable placing objects in overhead cabinets. (met)  4) Pt will report decreased pain to 2 out of 10 with ADLs. (met)  5) Pt will exhibit a Quick DASH score between  1-19 %, indicative of significant improvement in functional abilities.        Plan:  Continue OT POC. Preparing for discharge to [facility].   Treatment to include Modalities for pain management (moist heat, cold packs, Ultrasound, iontophoresis with Dexamethasone, electrical stimulation),  Manual therapy/ Joint Mobilizations,Therapeutic exercises / activities, pt education, HEP,  as well as any other treatments deemed necessary in order to maximize painfree functional use of  L  UE.             I certify the need for these services furnished under this plan of treatment and while under my care.  GRAEME Puentes,  [facility]      ____________________________________                         __________________  Physician/Referring Practitioner                                               Date of Signature

## 2018-05-07 NOTE — PROGRESS NOTES
Occupational Therapy Shoulder Daily Treatment Note      Patient:  Rosalva Stauffer  Date of Visit: 5/8/2018  MRN: [id number]  Referring Physician:  John Orosco Sr., MD  Diagnosis: L Bankart repair, subacromial decompression, and distal clavicle excision  Referral Orders:   Eval and treat     Start Time: 8:55 am  End Time: 9:50 am           Occupation:  , all clerical work    Date of onset: 6-7 months  Mechanism of Injury:   Gradual onset of pain that increased to difficulty with ROM.     Visit 9 of 12; Expires 4/20/2018    Hand dominance: R  DOS: 12/6/17  S/P: 22 Weeks   0 days    Subjective:  No new complaints.     Objective:  Range of Motion:      AROM      PROM                  MMT  Shoulder Ext/flex  62/152 (-8/+2)  45/170              Flex 4+/5  Abduction 0/160 (+12)   0/170  abd 4+/5  ER/IR at 90 degrees 93/60 (+16/-10)  85/75   ER 4+/5  IR 4+/5    Functional Limitations and Goal:   Self Care: Limited use of LUE for ADLs, bathing, dressing, overhead ADLS  Work/Activity : Limited use of LUE for driving, work tasks, laundry, housework,   Leisure:  Limited use of LUE for gym workouts       Treatment included :   Modalities: Moist Heat applied to L shoulder x 15 min to decrease pain and  increase blood flow and tissue elasticity prior to treatment.   Manual Therapy: (3) GHJ mobs, inferior and posterior glides, with oscillations; scapular mobs  There Ex: (42 min)    -Passive shoulder flexion, scaption, abduction, horiz add/abd, extension and ER/IR at side, 45 and 90   -V and Y overhead on wall 2/10 ea    -UBE 3 min ea direction 2.0    -Pulley machine 10-20# rows, Ir, Er,ext, 2-3/10 each    -Flexion, abd, scaption 2/10 each 3#    -Yellow theraband horiz abd  2/10 each (NT)   -PNF D1 and D2 flexion and extension 3# 2/10x each    - press in scaption with 2# 2/10    -Bicep curl 3 ways, 15ea, with 3#     -Red theraband ER with scaption to 90 2/10     -Riivald exercises 1 set of 10  each side, red theraband.     -Bicep stretch    -Wall ladder  5 reps for flexion stretch (NT)   -Red theraband diagonals 1/10    Assessment:  AROM improved for abudction and ER at 90.  ER MMT also improved.     Problem List:    Decreased ROM,  Decreased strength,  Decreased functional use,  Increased pain   Short Term Goals:  ( 4 weeks)   1) Patient will be independent with HEP and modalities for pain management. (met)  2) Pt will exhibit increased passive flexion to 45 degrees.  (met)  3 Pt will report decreased pain to 2-3  out of 10 at worst. (met)  4) Pt will exhibit a Quick DASH score of 40-50%, evidencing improving functional use of L Upper extremity.       Long Term Goals (12-16 weeks)  1) Patient will be return to OF, including work and light gym workouts. (in progress)  2) Pt will exhibit increased AROM  At 140-160 degrees  For flexion, abduction and WFL ER/IR to enable Reaching overhead shelves, washing hair/back, donning belt. (met)  3) Pt will exhibit 4/5 MMT for shoulder muscles to enable placing objects in overhead cabinets. (met)  4) Pt will report decreased pain to 2 out of 10 with ADLs. (met)  5) Pt will exhibit a Quick DASH score between  1-19 %, indicative of significant improvement in functional abilities.        Plan:  Continue OT POC. Preparing for discharge to [facility].   Treatment to include Modalities for pain management (moist heat, cold packs, Ultrasound, iontophoresis with Dexamethasone, electrical stimulation),  Manual therapy/ Joint Mobilizations,Therapeutic exercises / activities, pt education, HEP,  as well as any other treatments deemed necessary in order to maximize painfree functional use of  L  UE.             I certify the need for these services furnished under this plan of treatment and while under my care.  GRAEME Puentes, DHAVAL      ____________________________________                         __________________  Physician/Referring Practitioner                                                Date of Signature

## 2018-05-08 ENCOUNTER — CLINICAL SUPPORT (OUTPATIENT)
Dept: REHABILITATION | Facility: HOSPITAL | Age: 53
End: 2018-05-08
Attending: ORTHOPAEDIC SURGERY
Payer: COMMERCIAL

## 2018-05-08 DIAGNOSIS — M75.42 IMPINGEMENT SYNDROME OF LEFT SHOULDER: ICD-10-CM

## 2018-05-08 DIAGNOSIS — Z98.890 POST-OPERATIVE STATE: Primary | ICD-10-CM

## 2018-05-08 PROCEDURE — 97110 THERAPEUTIC EXERCISES: CPT

## 2018-05-09 NOTE — PROGRESS NOTES
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     Visit 10 of 12; Expires 4/20/2018    Hand dominance: R  DOS: 12/6/17  S/P: 22 Weeks   0 days    Subjective:  Pt reports she is still doing her workouts at home.     Objective:  Range of Motion:      AROM      PROM                  MMT  Shoulder Ext/flex  62/152 (-8/+2)  45/170              Flex 4+/5  Abduction 0/160 (+12)   0/170  abd 4+/5  ER/IR at 90 degrees 93/60 (+16/-10)  85/75   ER 4+/5  IR 4+/5    The Quick DASH Questionnaire- The following scores are based on patient reported assessment at the time of the initial occupational therapy evaluation:    Activity:  1. Open a tight jar: mild Difficulty  2. Do heavy household chores: no Difficulty  3. Carry a shopping bag or briefcase: mild Difficulty  4. Wash your back: moderate Difficulty  5.Use a knife to cut food:no Difficulty  6.. Recreational activities requiring force through arm: mild Difficulty    7. Social Limitation: mildLimited  8. Work/ADL Limitation: not at all Limited    Severity of Symptoms (over the past week)  9. Pain: mild  10. Tingling: mild    11. Sleeping Limitation: mild Difficulty    Score: 20        Functional Limitations and Goal:   Self Care: Limited use of LUE for ADLs, bathing, dressing, overhead ADLS  Work/Activity : Limited use of LUE for driving, work tasks, laundry, housework,   Leisure:  Limited use of LUE for gym workouts       Treatment included :   Modalities: Moist Heat applied to L shoulder x 15 min to decrease  pain and  increase blood flow and tissue elasticity prior to treatment.   Manual Therapy: (3) GHJ mobs, inferior and posterior glides, with oscillations; scapular mobs  There Ex: (42 min)    -Passive shoulder flexion, scaption, abduction, horiz add/abd, extension and ER/IR at side, 45 and 90   -V and Y overhead on wall 2/10 ea    -UBE 3 min ea direction 2.0    -Pulley machine 10-20# rows, Ir, Er,ext, 2-3/10 each    -Flexion, abd, scaption 2/10 each 3#    -Yellow theraband horiz abd  2/10 each (NT)   -PNF D1 and D2 flexion and extension 3# 2/10x each    - press in scaption with 2# 2/10    -Bicep curl 3 ways, 15ea, with 3#     -Red theraband ER with scaption to 90 2/10     -Riivald exercises 1 set of 10 each side, red theraband.     -Bicep stretch    -Wall ladder  5 reps for flexion stretch (NT)   -Red theraband diagonals 1/10   -ER at 90 with 2# 3/10   -Sleeper stretch    Assessment: All goals met except Quick DASH score, which was missed by one point. Quick DASH score improved 43% since  evaluation.    Problem List:    Decreased ROM,  Decreased strength,  Decreased functional use,  Increased pain   Short Term Goals:  ( 4 weeks)   1) Patient will be independent with HEP and modalities for pain management. (met)  2) Pt will exhibit increased passive flexion to 45 degrees.  (met)  3 Pt will report decreased pain to 2-3  out of 10 at worst. (met)  4) Pt will exhibit a Quick DASH score of 40-50%, evidencing improving functional use of L Upper extremity.  (met)     Long Term Goals (12-16 weeks)  1) Patient will be return to PLOF, including work and light gym workouts. (met)  2) Pt will exhibit increased AROM  At 140-160 degrees  For flexion, abduction and WFL ER/IR to enable Reaching overhead shelves, washing hair/back, donning belt. (met)  3) Pt will exhibit 4/5 MMT for shoulder muscles to enable placing objects in overhead cabinets. (met)  4) Pt will report decreased pain to 2 out of 10 with ADLs. (met)  5) Pt  will exhibit a Quick DASH score between  1-19 %, indicative of significant improvement in functional abilities. (not met)       Plan:  Discharge OT services to HEP. Pt to continue daily stretching and strengthening qod.    I certify the need for these services furnished under this plan of treatment and while under my care.  GRAEME Puentes, SHET      ____________________________________                         __________________  Physician/Referring Practitioner                                               Date of Signature

## 2018-05-10 ENCOUNTER — CLINICAL SUPPORT (OUTPATIENT)
Dept: REHABILITATION | Facility: HOSPITAL | Age: 53
End: 2018-05-10
Attending: ORTHOPAEDIC SURGERY
Payer: COMMERCIAL

## 2018-05-10 DIAGNOSIS — Z98.890 POST-OPERATIVE STATE: Primary | ICD-10-CM

## 2018-05-10 DIAGNOSIS — M75.42 IMPINGEMENT SYNDROME OF LEFT SHOULDER: ICD-10-CM

## 2018-05-10 PROCEDURE — 97110 THERAPEUTIC EXERCISES: CPT

## 2018-05-23 ENCOUNTER — OFFICE VISIT (OUTPATIENT)
Dept: GASTROENTEROLOGY | Facility: CLINIC | Age: 53
End: 2018-05-23
Payer: COMMERCIAL

## 2018-05-23 VITALS
SYSTOLIC BLOOD PRESSURE: 110 MMHG | DIASTOLIC BLOOD PRESSURE: 68 MMHG | HEART RATE: 60 BPM | HEIGHT: 66 IN | WEIGHT: 138.88 LBS | BODY MASS INDEX: 22.32 KG/M2

## 2018-05-23 DIAGNOSIS — Z80.0 FH: COLON CANCER: ICD-10-CM

## 2018-05-23 DIAGNOSIS — Z12.11 SCREENING FOR MALIGNANT NEOPLASM OF COLON: Primary | ICD-10-CM

## 2018-05-23 DIAGNOSIS — G20.A1 PARKINSON'S DISEASE: ICD-10-CM

## 2018-05-23 PROCEDURE — 99202 OFFICE O/P NEW SF 15 MIN: CPT | Mod: S$GLB,,, | Performed by: NURSE PRACTITIONER

## 2018-05-23 PROCEDURE — 99999 PR PBB SHADOW E&M-EST. PATIENT-LVL III: CPT | Mod: PBBFAC,,, | Performed by: NURSE PRACTITIONER

## 2018-05-23 RX ORDER — SODIUM, POTASSIUM,MAG SULFATES 17.5-3.13G
1 SOLUTION, RECONSTITUTED, ORAL ORAL ONCE
Qty: 1 BOTTLE | Refills: 0 | Status: SHIPPED | OUTPATIENT
Start: 2018-05-23 | End: 2018-05-23

## 2018-05-23 NOTE — PROGRESS NOTES
Clinic Follow Up:  [facility] Follow Up Note    Reason for Follow Up:  The primary encounter diagnosis was Screening for malignant neoplasm of colon. Diagnoses of FH: colon cancer and Parkinson's disease were also pertinent to this visit.    PCP: Lalita Patterson   No address on file    HPI:  This is a 53 y.o. female here to discuss need for colonoscopy  She states that she has been feeling overall well without any GI complaints.   Denies any abdominal pain.  No nausea or vomiting.  No change in bowel pattern.  No melena or hematochezia. No weight loss.  Last colonoscopy , unremarkable.  Pt's mother  of CRC.   PMH includes Parkinson's, well controlled.     Review of Systems   Constitutional: Negative for chills, fever, malaise/fatigue and weight loss.   Respiratory: Negative for cough.    Cardiovascular: Negative for chest pain.   Gastrointestinal:        Per HPI   Musculoskeletal: Negative for myalgias.   Skin: Negative for itching and rash.   Neurological: Negative for headaches.   Psychiatric/Behavioral: The patient is not nervous/anxious.        Medical History:  Past Medical History:   Diagnosis Date    DDD (degenerative disc disease), cervical     Hyperlipidemia     Hypertension     Leiomyoma     Parkinson's disease     Polymenorrhea     Prediabetes     Seasonal allergies        Surgical History:   Past Surgical History:   Procedure Laterality Date     SECTION, LOW TRANSVERSE      x1    GANGLION CYST EXCISION      HERNIA REPAIR      HYSTERECTOMY      Lysis of adhesions and enterotomy closure  2013    ROBOTIC ASSISTED HYSTERECTOMY  2013    With unilateral SO    Submaxillary gland drainage         Family History:   Family History   Problem Relation Age of Onset    Hypertension Mother     Cancer Mother     Colon cancer Mother     Diabetes Father     Hypertension Father     Hypertension Sister     Thyroid disease Sister     Hypertension  "Brother     Hypertension Sister     Hypertension Sister     Hypertension Brother     Cataracts Maternal Grandmother        Social History:   Social History   Substance Use Topics    Smoking status: Never Smoker    Smokeless tobacco: Never Used    Alcohol use No       Allergies: Reviewed    Home Medications:  Current Outpatient Prescriptions on File Prior to Visit   Medication Sig Dispense Refill    BD ALLERGY SYRINGE 1 mL 28 gauge Syrg       hydroCHLOROthiazide (MICROZIDE) 12.5 mg capsule Take 1 capsule (12.5 mg total) by mouth once daily. 90 capsule 1    multivitamin capsule Take 1 capsule by mouth once daily.      pramipexole (MIRAPEX) 0.5 MG tablet Take 0.5 mg by mouth 3 (three) times daily.      rasagiline (AZILECT) 1 mg Tab Take 1 mg by mouth once daily.       No current facility-administered medications on file prior to visit.        Physical Exam:  Vital Signs:  /68   Pulse 60   Ht 5' 6" (1.676 m)   Wt 63 kg (138 lb 14.2 oz)   LMP 05/28/2013   BMI 22.42 kg/m²   Body mass index is 22.42 kg/m².  Physical Exam   Constitutional: She is oriented to person, place, and time. She appears well-developed and well-nourished.   HENT:   Head: Normocephalic.   Eyes: No scleral icterus.   Neck: Normal range of motion.   Cardiovascular: Normal rate and regular rhythm.    Pulmonary/Chest: Effort normal and breath sounds normal.   Abdominal: Soft. Bowel sounds are normal. She exhibits no distension. There is no tenderness.   Musculoskeletal: Normal range of motion.   Neurological: She is alert and oriented to person, place, and time.   Skin: Skin is warm and dry.   Psychiatric: She has a normal mood and affect.   Vitals reviewed.      Labs: Pertinent labs reviewed.    Assessment:  1. Screening for malignant neoplasm of colon    2. FH: colon cancer    3. Parkinson's disease        Recommendations:  - Plan for colonoscopy with Suprep   - Continue F/U with PCP as planned.   Screening for malignant neoplasm " of colon  -     Case request GI: COLONOSCOPY  -     sodium,potassium,mag sulfates (SUPREP BOWEL PREP KIT) 17.5-3.13-1.6 gram SolR; Take 354 mLs by mouth once.  Dispense: 1 Bottle; Refill: 0    FH: colon cancer  -     Case request GI: COLONOSCOPY  -     sodium,potassium,mag sulfates (SUPREP BOWEL PREP KIT) 17.5-3.13-1.6 gram SolR; Take 354 mLs by mouth once.  Dispense: 1 Bottle; Refill: 0    Parkinson's disease          Return to Clinic:  Follow up to be determined by results of procedure.

## 2018-05-23 NOTE — PROGRESS NOTES
Clinic Consult:  [facility]ation Note    Reason for Consult:  The primary encounter diagnosis was Screening for malignant neoplasm of colon. A diagnosis of FH: colon cancer was also pertinent to this visit.    PCP: Lalita Patterson   No address on file    HPI:  This is a 53 y.o. female here for evaluation of     ROS    Medical History:   Past Medical History:   Diagnosis Date    DDD (degenerative disc disease), cervical     Hyperlipidemia     Hypertension     Leiomyoma     Parkinson's disease     Polymenorrhea     Prediabetes     Seasonal allergies        Surgical History:  Past Surgical History:   Procedure Laterality Date     SECTION, LOW TRANSVERSE      x1    GANGLION CYST EXCISION      HERNIA REPAIR      HYSTERECTOMY      Lysis of adhesions and enterotomy closure  2013    ROBOTIC ASSISTED HYSTERECTOMY  2013    With unilateral SO    Submaxillary gland drainage         Family History:   Family History   Problem Relation Age of Onset    Hypertension Mother     Cancer Mother     Colon cancer Mother     Diabetes Father     Hypertension Father     Hypertension Sister     Thyroid disease Sister     Hypertension Brother     Hypertension Sister     Hypertension Sister     Hypertension Brother     Cataracts Maternal Grandmother        Social History:   Social History   Substance Use Topics    Smoking status: Never Smoker    Smokeless tobacco: Never Used    Alcohol use No       Allergies: Reviewed    Home Medications:   Current Outpatient Prescriptions on File Prior to Visit   Medication Sig Dispense Refill    BD ALLERGY SYRINGE 1 mL 28 gauge Syrg       hydroCHLOROthiazide (MICROZIDE) 12.5 mg capsule Take 1 capsule (12.5 mg total) by mouth once daily. 90 capsule 1    multivitamin capsule Take 1 capsule by mouth once daily.      pramipexole (MIRAPEX) 0.5 MG tablet Take 0.5 mg by mouth 3 (three) times daily.      rasagiline (AZILECT) 1 mg Tab  "Take 1 mg by mouth once daily.       No current facility-administered medications on file prior to visit.        Physical Exam:  Vital Signs:  /68   Pulse 60   Ht 5' 6" (1.676 m)   Wt 63 kg (138 lb 14.2 oz)   LMP 05/28/2013   BMI 22.42 kg/m²   Body mass index is 22.42 kg/m².  Physical Exam    Labs: Pertinent labs reviewed.    Endoscopy:      CRC Screening:     Assessment:  1. Screening for malignant neoplasm of colon    2. FH: colon cancer         Recommendations:      No Follow-up on file.        Thank you so much for allowing me to participate in the care of JEFFREY Shaikh  "

## 2018-06-06 ENCOUNTER — PATIENT MESSAGE (OUTPATIENT)
Dept: ORTHOPEDICS | Facility: CLINIC | Age: 53
End: 2018-06-06

## 2018-06-28 ENCOUNTER — ANESTHESIA EVENT (OUTPATIENT)
Dept: ENDOSCOPY | Facility: HOSPITAL | Age: 53
End: 2018-06-28
Payer: COMMERCIAL

## 2018-06-28 ENCOUNTER — SURGERY (OUTPATIENT)
Age: 53
End: 2018-06-28

## 2018-06-28 ENCOUNTER — HOSPITAL ENCOUNTER (OUTPATIENT)
Facility: HOSPITAL | Age: 53
Discharge: HOME | End: 2018-06-28
Attending: INTERNAL MEDICINE | Admitting: INTERNAL MEDICINE
Payer: COMMERCIAL

## 2018-06-28 ENCOUNTER — ANESTHESIA (OUTPATIENT)
Dept: ENDOSCOPY | Facility: HOSPITAL | Age: 53
End: 2018-06-28
Payer: COMMERCIAL

## 2018-06-28 VITALS
HEART RATE: 66 BPM | SYSTOLIC BLOOD PRESSURE: 128 MMHG | TEMPERATURE: 98 F | BODY MASS INDEX: 20.88 KG/M2 | RESPIRATION RATE: 18 BRPM | WEIGHT: 133 LBS | HEIGHT: 67 IN | OXYGEN SATURATION: 99 % | DIASTOLIC BLOOD PRESSURE: 75 MMHG

## 2018-06-28 DIAGNOSIS — Z80.0 FH: COLON CANCER: ICD-10-CM

## 2018-06-28 PROCEDURE — G0105 COLORECTAL SCRN; HI RISK IND: HCPCS | Mod: ,,, | Performed by: INTERNAL MEDICINE

## 2018-06-28 PROCEDURE — 25000003 PHARM REV CODE 250: Performed by: INTERNAL MEDICINE

## 2018-06-28 PROCEDURE — G0105 COLORECTAL SCRN; HI RISK IND: HCPCS | Performed by: INTERNAL MEDICINE

## 2018-06-28 PROCEDURE — 37000008 HC ANESTHESIA 1ST 15 MINUTES: Performed by: INTERNAL MEDICINE

## 2018-06-28 PROCEDURE — 63600175 PHARM REV CODE 636 W HCPCS: Performed by: NURSE ANESTHETIST, CERTIFIED REGISTERED

## 2018-06-28 PROCEDURE — 37000009 HC ANESTHESIA EA ADD 15 MINS: Performed by: INTERNAL MEDICINE

## 2018-06-28 PROCEDURE — 25000003 PHARM REV CODE 250: Performed by: NURSE ANESTHETIST, CERTIFIED REGISTERED

## 2018-06-28 RX ORDER — PROPOFOL 10 MG/ML
VIAL (ML) INTRAVENOUS
Status: DISCONTINUED | OUTPATIENT
Start: 2018-06-28 | End: 2018-06-28

## 2018-06-28 RX ORDER — SODIUM CHLORIDE, SODIUM LACTATE, POTASSIUM CHLORIDE, CALCIUM CHLORIDE 600; 310; 30; 20 MG/100ML; MG/100ML; MG/100ML; MG/100ML
INJECTION, SOLUTION INTRAVENOUS CONTINUOUS
Status: DISCONTINUED | OUTPATIENT
Start: 2018-06-28 | End: 2018-06-28 | Stop reason: HOSPADM

## 2018-06-28 RX ORDER — LIDOCAINE HYDROCHLORIDE 10 MG/ML
INJECTION INFILTRATION; PERINEURAL
Status: DISCONTINUED | OUTPATIENT
Start: 2018-06-28 | End: 2018-06-28

## 2018-06-28 RX ADMIN — PROPOFOL 20 MG: 10 INJECTION, EMULSION INTRAVENOUS at 02:06

## 2018-06-28 RX ADMIN — LIDOCAINE HYDROCHLORIDE 40 MG: 10 INJECTION, SOLUTION INFILTRATION; PERINEURAL at 02:06

## 2018-06-28 RX ADMIN — PROPOFOL 90 MG: 10 INJECTION, EMULSION INTRAVENOUS at 02:06

## 2018-06-28 RX ADMIN — SODIUM CHLORIDE, SODIUM LACTATE, POTASSIUM CHLORIDE, AND CALCIUM CHLORIDE: .6; .31; .03; .02 INJECTION, SOLUTION INTRAVENOUS at 12:06

## 2018-06-28 NOTE — ANESTHESIA RELEASE NOTE
"Anesthesia Release from PACU Note    Patient: Rosalva Ken    Procedure(s) Performed: Procedure(s) (LRB):  COLONOSCOPY (N/A)    Anesthesia type: MAC    Post pain: Adequate analgesia    Post assessment: no apparent anesthetic complications, tolerated procedure well and no evidence of recall    Last Vitals:   Visit Vitals  /78   Pulse 64   Temp 36.5 °C (97.7 °F) (Oral)   Resp 20   Ht 5' 6.5" (1.689 m)   Wt 60.3 kg (133 lb)   LMP 05/28/2013   SpO2 99%   Breastfeeding? No   BMI 21.15 kg/m²       Post vital signs: stable    Level of consciousness: awake    Nausea/Vomiting: no nausea/no vomiting    Complications: none    Airway Patency: patent    Respiratory: unassisted, spontaneous ventilation, room air    Cardiovascular: stable and blood pressure at baseline    Hydration: euvolemic  "

## 2018-06-28 NOTE — PLAN OF CARE
Dr Oconnell came to bedside and discussed findings. NO N/V,  no abdominal pain, no GI bleeding, and vitals stable.  Pt discharged from unit.

## 2018-06-28 NOTE — [employer] PATIENT INSTRUCTIONS
Discharge Summary/Instructions after an Endoscopic Procedure  Patient Name: Rosalva Sequeira  Patient MRN: [id number]  Patient YOB: 1965 Thursday, June 28, 2018 Carlos Oconnell III, MD  RESTRICTIONS:  During your procedure today, you received medications for sedation.  These   medications may affect your judgment, balance and coordination.  Therefore,   for 24 hours, you have the following restrictions:   - DO NOT drive a car, operate machinery, make legal/financial decisions,   sign important papers or drink alcohol.    ACTIVITY:  Today: no heavy lifting, straining or running due to procedural   sedation/anesthesia.  The following day: return to full activity including work.  DIET:  Eat and drink normally unless instructed otherwise.     TREATMENT FOR COMMON SIDE EFFECTS:  - Mild abdominal pain, nausea, belching, bloating or excessive gas:  rest,   eat lightly and use a heating pad.  - Sore Throat: treat with throat lozenges and/or gargle with warm salt   water.  - Because air was used during the procedure, expelling large amounts of air   from your rectum or belching is normal.  - If a bowel prep was taken, you may not have a bowel movement for 1-3 days.    This is normal.  SYMPTOMS TO WATCH FOR AND REPORT TO YOUR PHYSICIAN:  1. Abdominal pain or bloating, other than gas cramps.  2. Chest pain.  3. Back pain.  4. Signs of infection such as: chills or fever occurring within 24 hours   after the procedure.  5. Rectal bleeding, which would show as bright red, maroon, or black stools.   (A tablespoon of blood from the rectum is not serious, especially if   hemorrhoids are present.)  6. Vomiting.  7. Weakness or dizziness.  GO DIRECTLY TO THE NEAREST EMERGENCY ROOM IF YOU HAVE ANY OF THE FOLLOWING:      Difficulty breathing              Chills and/or fever over 101 F   Persistent vomiting and/or vomiting blood   Severe abdominal pain   Severe chest pain   Black, tarry stools   Bleeding- more than one  tablespoon   Any other symptom or condition that you feel may need urgent attention  Your doctor recommends these additional instructions:  If any biopsies were taken, your doctors clinic will contact you in 1 to 2   weeks with any results.  - Discharge patient to home (via wheelchair).   - High fiber diet.   - Continue present medications.   - Repeat colonoscopy in 5 years for screening purposes.   - Return to primary care physician as previously scheduled.   - Discharge patient to home (via wheelchair).   - High fiber diet.   - Await pathology results.   - Repeat colonoscopy in 5 years for screening purposes.   - Return to primary care physician as previously scheduled.  For questions, problems or results please call your physician Carlos Oconnell III, MD at Work:  [phone]  If you have any questions about the above instructions, call the GI   department at [phone] or call the endoscopy unit at [phone]   from 7am until 3 pm.  [facility] [place], EMERGENCY ROOM PHONE NUMBER:   [phone]  IF A COMPLICATION OR EMERGENCY SITUATION ARISES AND YOU ARE UNABLE TO REACH   YOUR PHYSICIAN - GO DIRECTLY TO THE EMERGENCY ROOM.  I have read or have had read to me these discharge instructions for my   procedure and have received a written copy.  I understand these   instructions and will follow-up with my physician if I have any questions.     __________________________________       _____________________________________  Nurse Signature                                          Patient/Designated   Responsible Party Signature  Carlos Oconnell III, MD  6/28/2018 2:57:40 PM  This report has been verified and signed electronically.  [employer]

## 2018-06-28 NOTE — TRANSFER OF CARE
"Anesthesia Transfer of Care Note    Patient: Rosalva Ken    Procedure(s) Performed: Procedure(s) (LRB):  COLONOSCOPY (N/A)    Patient location: PACU    Anesthesia Type: MAC    Transport from OR: Transported from OR on room air with adequate spontaneous ventilation    Post pain: adequate analgesia    Post assessment: no apparent anesthetic complications and tolerated procedure well    Post vital signs: stable    Level of consciousness: awake    Nausea/Vomiting: no nausea/vomiting    Complications: none    Transfer of care protocol was followed      Last vitals:   Visit Vitals  /78   Pulse 64   Temp 36.5 °C (97.7 °F) (Oral)   Resp 20   Ht 5' 6.5" (1.689 m)   Wt 60.3 kg (133 lb)   LMP 05/28/2013   SpO2 99%   Breastfeeding? No   BMI 21.15 kg/m²     "

## 2018-06-28 NOTE — H&P
Short Stay Endoscopy History and Physical    PCP - Lalita Patterson MD    Procedure - Colonoscopy  ASA - 2  Mallampati - per anesthesia  History of Anesthesia problems - no  Family history Anesthesia problems -  no     HPI:  This is a 53 y.o.female here for evaluation of : Colon Cancer Screen    Reflux - no  Dysphagia - no  Abdominal pain - no  Diarrhea - no  Anemia - no  GI bleeding - no  Nausea and vomiting-no  Early satiety-no  aversion to sight or smell of food-no    ROS:  Constitutional: No fevers, chills, No weight loss  ENT: No allergies  CV: No chest pain  Pulm: No cough, No shortness of breath  Ophtho: No vision changes  GI: see HPI  Derm: No rash  Heme: No lymphadenopathy, No bruising  MSK: No arthritis  : No dysuria, No hematuria  Endo: No hot or cold intolerance  Neuro: No syncope, No seizure  Psych: No anxiety, No depression    Medical History:  Past Medical History:   Diagnosis Date    DDD (degenerative disc disease), cervical     Hyperlipidemia     Hypertension     Leiomyoma     Parkinson's disease     Polymenorrhea     Prediabetes     Seasonal allergies        Surgical History:  Past Surgical History:   Procedure Laterality Date     SECTION, LOW TRANSVERSE      x1    GANGLION CYST EXCISION      HERNIA REPAIR      HYSTERECTOMY      Lysis of adhesions and enterotomy closure  2013    ROBOTIC ASSISTED HYSTERECTOMY  2013    With unilateral SO    Submaxillary gland drainage         Family History:  Family History   Problem Relation Age of Onset    Hypertension Mother     Cancer Mother     Colon cancer Mother     Diabetes Father     Hypertension Father     Hypertension Sister     Thyroid disease Sister     Hypertension Brother     Hypertension Sister     Hypertension Sister     Hypertension Brother     Cataracts Maternal Grandmother        Social History:  Social History     Social History    Marital status:      Spouse name: N/A    Number of  children: N/A    Years of education: N/A     Occupational History     [facility] [place]     Social History Main Topics    Smoking status: Never Smoker    Smokeless tobacco: Never Used    Alcohol use No    Drug use: No    Sexual activity: Not Currently     Other Topics Concern    Not on file     Social History Narrative    The patient is  and has one child. 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                  Allergies:   Review of patient's allergies indicates:   Allergen Reactions    Allegra [fexofenadine] Palpitations    Allegra-d 12 hour [fexofenadine-pseudoephedrine] Palpitations       Medications:   No current facility-administered medications on file prior to encounter.      Current Outpatient Prescriptions on File Prior to Encounter   Medication Sig Dispense Refill    BD ALLERGY SYRINGE 1 mL 28 gauge Syrg       hydroCHLOROthiazide (MICROZIDE) 12.5 mg capsule Take 1 capsule (12.5 mg total) by mouth once daily. 90 capsule 1    multivitamin capsule Take 1 capsule by mouth once daily.      pramipexole (MIRAPEX) 0.5 MG tablet Take 0.5 mg by mouth 3 (three) times daily.      rasagiline (AZILECT) 1 mg Tab Take 1 mg by mouth once daily.         Objective Findings:    Vital Signs:  Vitals:    06/28/18 1214   BP: 137/78   Pulse: 64   Resp: 20   Temp: 97.7 °F (36.5 °C)           Physical Exam:  General Appearance: Well appearing in no acute distress  Eyes:    No scleral icterus  ENT: Neck supple, Lips, mucosa, and tongue normal; teeth and gums normal  Lungs: CTA bilaterally in anterior and posterior fields, no wheezes, no crackles.  Heart:  Regular rate, S1, S2 normal, no murmurs heard.  Abdomen: Soft, non tender, non distended with normal bowel sounds. No hepatosplenomegaly, ascites, or mass.  Extremities: No clubbing, cyanosis or edema  Skin: No rash    Labs:  Reviewed    Plan:Colonoscopy  I have explained the risks and benefits of endoscopy procedures to the patient including but not limited to bleeding,  perforation, infection, and death. The patient wishes to proceed.

## 2018-06-28 NOTE — ANESTHESIA POSTPROCEDURE EVALUATION
"Anesthesia Post Evaluation    Patient: Rosalva Ken    Procedure(s) Performed: Procedure(s) (LRB):  COLONOSCOPY (N/A)    Final Anesthesia Type: MAC  Patient location during evaluation: PACU  Patient participation: Yes- Able to Participate  Level of consciousness: awake  Post-procedure vital signs: reviewed and stable  Pain management: adequate  Airway patency: patent  PONV status at discharge: No PONV  Anesthetic complications: no      Cardiovascular status: blood pressure returned to baseline and hemodynamically stable  Respiratory status: unassisted, spontaneous ventilation and room air  Hydration status: euvolemic  Follow-up not needed.        Visit Vitals  /78   Pulse 64   Temp 36.5 °C (97.7 °F) (Oral)   Resp 20   Ht 5' 6.5" (1.689 m)   Wt 60.3 kg (133 lb)   LMP 05/28/2013   SpO2 99%   Breastfeeding? No   BMI 21.15 kg/m²       Pain/Hector Score: Pain Assessment Performed: Yes (6/28/2018 12:19 PM)  Presence of Pain: denies (6/28/2018 12:19 PM)      "

## 2018-06-28 NOTE — ANESTHESIA PREPROCEDURE EVALUATION
06/28/2018  Rosalva Ken is a 53 y.o., female.    Anesthesia Evaluation    I have reviewed the Patient Summary Reports.    I have reviewed the Nursing Notes.   I have reviewed the Medications.     Review of Systems  Anesthesia Hx:  No problems with previous Anesthesia  History of prior surgery of interest to airway management or planning: Previous anesthesia: General prev colonoscopy-no problems with anesthesia with general anesthesia.  Denies Family Hx of Anesthesia complications.   Denies Personal Hx of Anesthesia complications.   Social:  Non-Smoker, No Alcohol Use    Hematology/Oncology:  Hematology Normal   Oncology Normal     EENT/Dental:EENT/Dental Normal   Cardiovascular:   Hypertension, well controlled    Pulmonary:  Pulmonary Normal    Renal/:  Renal/ Normal     Hepatic/GI:  Hepatic/GI Normal    Neurological:   Parkinsons disease   Dermatological:  Skin Normal    Psych:  Psychiatric Normal           Physical Exam  General:  Well nourished    Airway/Jaw/Neck:  Airway Findings: Mouth Opening: Normal Tongue: Normal  General Airway Assessment: Adult  Mallampati: II  Improves to II with phonation.  TM Distance: Normal, at least 6 cm      Dental:  Dental Findings: In tact        Mental Status:  Mental Status Findings:  Cooperative         Anesthesia Plan  Type of Anesthesia, risks & benefits discussed:  Anesthesia Type:  MAC  Patient's Preference:   Intra-op Monitoring Plan:   Intra-op Monitoring Plan Comments:   Post Op Pain Control Plan:   Post Op Pain Control Plan Comments:   Induction:    Beta Blocker:         Informed Consent:  Anesthesia consent signed with patient.  ASA Score: 2     Day of Surgery Review of History & Physical: I have interviewed and examined the patient. I have reviewed the patient's H&P dated:  There are no significant changes.          Ready For Surgery From  Anesthesia Perspective.

## 2018-06-28 NOTE — DISCHARGE SUMMARY
[facility]  Brief Operative Note     SUMMARY     Surgery Date: 6/28/2018     Surgeon(s) and Role:     * Carlos Oconnell III, MD - Primary    Assisting Surgeon: None    Pre-op Diagnosis:  FH: colon cancer [Z80.0]  Screening for malignant neoplasm of colon [Z12.11]    Post-op Diagnosis:  Post-Op Diagnosis Codes:     * FH: colon cancer [Z80.0]     * Screening for malignant neoplasm of colon [Z12.11]    Procedure(s) (LRB):  COLONOSCOPY (N/A)    Anesthesia: Choice    Description of the findings of the procedure: Procedures completed. See Procedure note for full details.    Findings/Key Components: Procedures completed. See Procedure note for full details.    Prosthetics/Devices: None    Estimated Blood Loss: * No values recorded between 6/28/2018 12:00 AM and 6/28/2018  3:00 PM *         Specimens:   Specimen (12h ago through future)    None          Discharge Note    SUMMARY     Admit Date: 6/28/2018    Discharge Date and Time: 6/28/2018    Hospital Course (synopsis of major diagnoses, care, treatment, and services provided during the course of the hospital stay):  Procedures completed. See Procedure note for full details. Discharge patient when discharge criteria met.    Final Diagnosis: Post-Op Diagnosis Codes:     * FH: colon cancer [Z80.0]     * Screening for malignant neoplasm of colon [Z12.11]    Disposition: Discharge patient when discharge criteria met.    Follow Up/Patient Instructions:       Medications:  Reconciled Home Medications: Current Discharge Medication List      CONTINUE these medications which have NOT CHANGED    Details   BD ALLERGY SYRINGE 1 mL 28 gauge Syrg       hydroCHLOROthiazide (MICROZIDE) 12.5 mg capsule Take 1 capsule (12.5 mg total) by mouth once daily.  Qty: 90 capsule, Refills: 1      multivitamin capsule Take 1 capsule by mouth once daily.      pramipexole (MIRAPEX) 0.5 MG tablet Take 0.5 mg by mouth 3 (three) times daily.      rasagiline (AZILECT) 1 mg Tab Take 1 mg  by mouth once daily.              Discharge Procedure Orders  Diet general     Activity as tolerated

## 2018-07-31 ENCOUNTER — TELEPHONE (OUTPATIENT)
Dept: FAMILY MEDICINE | Facility: CLINIC | Age: 53
End: 2018-07-31

## 2018-07-31 NOTE — TELEPHONE ENCOUNTER
----- Message from Nyasia Kim sent at 7/31/2018  9:02 AM CDT -----  Contact: Rosalva Blackwell at [phone] regarding Xray results

## 2018-08-07 ENCOUNTER — PATIENT MESSAGE (OUTPATIENT)
Dept: FAMILY MEDICINE | Facility: CLINIC | Age: 53
End: 2018-08-07

## 2018-08-07 RX ORDER — FLUCONAZOLE 150 MG/1
150 TABLET ORAL ONCE
Qty: 1 TABLET | Refills: 0 | Status: SHIPPED | OUTPATIENT
Start: 2018-08-07 | End: 2018-08-07

## 2018-10-01 ENCOUNTER — LAB VISIT (OUTPATIENT)
Dept: LAB | Facility: HOSPITAL | Age: 53
End: 2018-10-01
Attending: FAMILY MEDICINE
Payer: COMMERCIAL

## 2018-10-01 ENCOUNTER — OFFICE VISIT (OUTPATIENT)
Dept: FAMILY MEDICINE | Facility: CLINIC | Age: 53
End: 2018-10-01
Payer: COMMERCIAL

## 2018-10-01 VITALS
BODY MASS INDEX: 22.56 KG/M2 | RESPIRATION RATE: 18 BRPM | OXYGEN SATURATION: 99 % | HEART RATE: 68 BPM | TEMPERATURE: 97 F | SYSTOLIC BLOOD PRESSURE: 136 MMHG | DIASTOLIC BLOOD PRESSURE: 80 MMHG | WEIGHT: 143.75 LBS | HEIGHT: 67 IN

## 2018-10-01 DIAGNOSIS — Z00.00 PREVENTATIVE HEALTH CARE: ICD-10-CM

## 2018-10-01 DIAGNOSIS — G20.A1 PARKINSON'S DISEASE: ICD-10-CM

## 2018-10-01 DIAGNOSIS — Z00.00 PREVENTATIVE HEALTH CARE: Primary | ICD-10-CM

## 2018-10-01 DIAGNOSIS — R73.03 PREDIABETES: ICD-10-CM

## 2018-10-01 DIAGNOSIS — E78.5 HYPERLIPIDEMIA, UNSPECIFIED HYPERLIPIDEMIA TYPE: ICD-10-CM

## 2018-10-01 DIAGNOSIS — I10 ESSENTIAL HYPERTENSION: ICD-10-CM

## 2018-10-01 DIAGNOSIS — Z12.31 ENCOUNTER FOR SCREENING MAMMOGRAM FOR MALIGNANT NEOPLASM OF BREAST: ICD-10-CM

## 2018-10-01 DIAGNOSIS — Z23 NEEDS FLU SHOT: ICD-10-CM

## 2018-10-01 PROBLEM — Z80.0 FH: COLON CANCER: Status: RESOLVED | Noted: 2018-06-28 | Resolved: 2018-10-01

## 2018-10-01 PROBLEM — Z98.890 POST-OPERATIVE STATE: Status: RESOLVED | Noted: 2017-12-22 | Resolved: 2018-10-01

## 2018-10-01 PROBLEM — M75.42 IMPINGEMENT SYNDROME OF LEFT SHOULDER: Status: RESOLVED | Noted: 2017-12-06 | Resolved: 2018-10-01

## 2018-10-01 LAB
ALBUMIN SERPL BCP-MCNC: 3.9 G/DL
ALP SERPL-CCNC: 60 U/L
ALT SERPL W/O P-5'-P-CCNC: 64 U/L
ANION GAP SERPL CALC-SCNC: 10 MMOL/L
AST SERPL-CCNC: 44 U/L
BASOPHILS # BLD AUTO: 0.03 K/UL
BASOPHILS NFR BLD: 0.8 %
BILIRUB SERPL-MCNC: 0.6 MG/DL
BUN SERPL-MCNC: 17 MG/DL
CALCIUM SERPL-MCNC: 9.8 MG/DL
CHLORIDE SERPL-SCNC: 102 MMOL/L
CHOLEST SERPL-MCNC: 271 MG/DL
CHOLEST/HDLC SERPL: 2.9 {RATIO}
CO2 SERPL-SCNC: 26 MMOL/L
CREAT SERPL-MCNC: 1 MG/DL
DIFFERENTIAL METHOD: ABNORMAL
EOSINOPHIL # BLD AUTO: 0.1 K/UL
EOSINOPHIL NFR BLD: 3.4 %
ERYTHROCYTE [DISTWIDTH] IN BLOOD BY AUTOMATED COUNT: 12.6 %
EST. GFR  (AFRICAN AMERICAN): >60 ML/MIN/1.73 M^2
EST. GFR  (NON AFRICAN AMERICAN): >60 ML/MIN/1.73 M^2
ESTIMATED AVG GLUCOSE: 123 MG/DL
GLUCOSE SERPL-MCNC: 82 MG/DL
HBA1C MFR BLD HPLC: 5.9 %
HCT VFR BLD AUTO: 39.3 %
HDLC SERPL-MCNC: 95 MG/DL
HDLC SERPL: 35.1 %
HGB BLD-MCNC: 12.5 G/DL
IMM GRANULOCYTES # BLD AUTO: 0 K/UL
IMM GRANULOCYTES NFR BLD AUTO: 0 %
LDLC SERPL CALC-MCNC: 162.2 MG/DL
LYMPHOCYTES # BLD AUTO: 1.6 K/UL
LYMPHOCYTES NFR BLD: 41.8 %
MCH RBC QN AUTO: 29.6 PG
MCHC RBC AUTO-ENTMCNC: 31.8 G/DL
MCV RBC AUTO: 93 FL
MONOCYTES # BLD AUTO: 0.3 K/UL
MONOCYTES NFR BLD: 8.5 %
NEUTROPHILS # BLD AUTO: 1.7 K/UL
NEUTROPHILS NFR BLD: 45.5 %
NONHDLC SERPL-MCNC: 176 MG/DL
NRBC BLD-RTO: 0 /100 WBC
PLATELET # BLD AUTO: 204 K/UL
PMV BLD AUTO: 12.2 FL
POTASSIUM SERPL-SCNC: 3.9 MMOL/L
PROT SERPL-MCNC: 7.4 G/DL
RBC # BLD AUTO: 4.23 M/UL
SODIUM SERPL-SCNC: 138 MMOL/L
TRIGL SERPL-MCNC: 69 MG/DL
TSH SERPL DL<=0.005 MIU/L-ACNC: 0.75 UIU/ML
WBC # BLD AUTO: 3.78 K/UL

## 2018-10-01 PROCEDURE — 83036 HEMOGLOBIN GLYCOSYLATED A1C: CPT

## 2018-10-01 PROCEDURE — 84443 ASSAY THYROID STIM HORMONE: CPT

## 2018-10-01 PROCEDURE — 99396 PREV VISIT EST AGE 40-64: CPT | Mod: 25,S$GLB,, | Performed by: FAMILY MEDICINE

## 2018-10-01 PROCEDURE — 85025 COMPLETE CBC W/AUTO DIFF WBC: CPT

## 2018-10-01 PROCEDURE — 90686 IIV4 VACC NO PRSV 0.5 ML IM: CPT | Mod: S$GLB,,, | Performed by: FAMILY MEDICINE

## 2018-10-01 PROCEDURE — 90471 IMMUNIZATION ADMIN: CPT | Mod: S$GLB,,, | Performed by: FAMILY MEDICINE

## 2018-10-01 PROCEDURE — 99999 PR PBB SHADOW E&M-EST. PATIENT-LVL III: CPT | Mod: PBBFAC,,, | Performed by: FAMILY MEDICINE

## 2018-10-01 PROCEDURE — 80061 LIPID PANEL: CPT

## 2018-10-01 PROCEDURE — 36415 COLL VENOUS BLD VENIPUNCTURE: CPT | Mod: PO

## 2018-10-01 PROCEDURE — 80053 COMPREHEN METABOLIC PANEL: CPT

## 2018-10-01 RX ORDER — AZELASTINE 1 MG/ML
1 SPRAY, METERED NASAL 2 TIMES DAILY
COMMUNITY
End: 2022-08-26 | Stop reason: SDUPTHER

## 2018-10-01 NOTE — PROGRESS NOTES
CHIEF COMPLAINT:  This is a 53-year-old female here for preventive health exam.    SUBJECTIVE:  Patient is doing well without complaints.  She is taking Azilect and Mirapex for Parkinson's disease per her neurologist.  She has a tremor and was told that she had mild disease.  Her hypertension is controlled on HCTZ.  She has dyslipidemia and prediabetes.  She is not exercising regularly.     Eye exam October 2017. Mammogram October 2017. Colonoscopy June 2018, due again in June 2023. Tdap September 2011. Flu vaccine September 2017.     ROS:  GENERAL: Patient denies fever, chills, night sweats. Patient denies weight gain or loss. Patient denies anorexia, fatigue, weakness or swollen glands.  SKIN: Patient denies rash or hair loss.  HEENT: Patient denies sore throat, ear pain, hearing loss, nasal congestion, or runny nose. Patient denies visual disturbance, eye irritation or discharge.  LUNGS: Patient denies cough, wheeze or hemoptysis.  CARDIOVASCULAR: Patient denies chest pain, shortness of breath, palpitations, syncope or lower extremity edema.  GI: Patient denies abdominal pain, nausea, vomiting, diarrhea, constipation, blood in stool or melena.  GENITOURINARY: Patient denies pelvic pain, vaginal discharge, itch or odor. Patient denies irregular vaginal bleeding. Patient denies dysuria, frequency, hematuria, nocturia, urgency or incontinence.  BREASTS: Patient denies breast pain, mass or nipple discharge.  MUSCULOSKELETAL: Patient denies joint pain, swelling, redness or warmth.  NEUROLOGIC: Patient denies headache, vertigo, paresthesias, weakness in limb, dysarthria, dysphagia or abnormality of gait.  PSYCHIATRIC: Patient denies anxiety, depression, or memory loss.     OBJECTIVE:   GENERAL: Well-developed well-nourished , black female alert and oriented x3, in no acute distress. Memory, judgment and cognition without deficit.   SKIN: Clear without rash. Normal color and tone.   HEENT: Eyes: Clear conjunctivae. No  scleral icterus. Pupils equal reactive to light and accommodation. Ears: Clear TMs. Clear canals. Nose: Without congestion. Pharynx: Without injection or exudates.   NECK: Supple, normal range of motion. No masses, lymphadenopathy or enlarged thyroid. No JVD. Carotids 2+ and equal. No bruits.   LUNGS: Clear to auscultation. Normal respiratory effort.   CARDIOVASCULAR: Regular rhythm, normal S1, S2 without murmur, gallop or rub.   BACK: No CVA or spinal tenderness.   BREASTS: No masses, tenderness or nipple discharge.   ABDOMEN: Normal appearance. Active bowel sounds. Soft, nontender without mass or organomegaly. No rebound or guarding. Well-healed incisional scars.   EXTREMITIES: Without cyanosis, clubbing or edema. Distal pulses 2+ and equal. Normal range of motion in all extremities. No joint effusion, erythema or warmth.   NEUROLOGIC: Cranial nerves II through XII without deficit. Motor strength equal bilaterally. Sensation normal to touch. Deep tendon reflexes 2+ and equal. Gait without abnormality. No tremor. Negative cerebellar signs.   PELVIC: External: Without lesions or inflammation. Vaginal: Cuff intact. Bimanual: Normal size and shape. Adnexa: Non-tender, without masses. Rectovaginal: Confirms, heme-negative stool x2.    ASSESSMENT:  1. Preventative health care    2. Hyperlipidemia, unspecified hyperlipidemia type    3. Essential hypertension    4. Parkinson's disease    5. Prediabetes    6. Encounter for screening mammogram for malignant neoplasm of breast    7. Needs flu shot      PLAN:   1.  Exercise regularly.  2.  Age-appropriate counseling.  3.  Fasting lab.  4.  Mammogram.  5.  Influenza vaccine.  6.  Refill medications as needed.  7.  Follow-up annually.

## 2018-10-03 ENCOUNTER — PATIENT MESSAGE (OUTPATIENT)
Dept: FAMILY MEDICINE | Facility: CLINIC | Age: 53
End: 2018-10-03

## 2018-10-03 DIAGNOSIS — R74.8 ELEVATED LIVER ENZYMES: Primary | ICD-10-CM

## 2018-10-08 DIAGNOSIS — R74.8 ELEVATED LIVER ENZYMES: Primary | ICD-10-CM

## 2018-10-11 ENCOUNTER — LAB VISIT (OUTPATIENT)
Dept: LAB | Facility: HOSPITAL | Age: 53
End: 2018-10-11
Attending: FAMILY MEDICINE
Payer: COMMERCIAL

## 2018-10-11 DIAGNOSIS — R74.8 ELEVATED LIVER ENZYMES: ICD-10-CM

## 2018-10-11 LAB
ALBUMIN SERPL BCP-MCNC: 4 G/DL
ALP SERPL-CCNC: 69 U/L
ALT SERPL W/O P-5'-P-CCNC: 72 U/L
AST SERPL-CCNC: 45 U/L
BILIRUB DIRECT SERPL-MCNC: 0.1 MG/DL
BILIRUB SERPL-MCNC: 0.3 MG/DL
CERULOPLASMIN SERPL-MCNC: 26 MG/DL
HBV SURFACE AB SER-ACNC: NEGATIVE M[IU]/ML
HBV SURFACE AG SERPL QL IA: NEGATIVE
HCV AB SERPL QL IA: NEGATIVE
IRON SERPL-MCNC: 91 UG/DL
PROT SERPL-MCNC: 7.9 G/DL
SATURATED IRON: 18 %
TOTAL IRON BINDING CAPACITY: 502 UG/DL
TRANSFERRIN SERPL-MCNC: 339 MG/DL

## 2018-10-11 PROCEDURE — 83540 ASSAY OF IRON: CPT

## 2018-10-11 PROCEDURE — 86803 HEPATITIS C AB TEST: CPT

## 2018-10-11 PROCEDURE — 86706 HEP B SURFACE ANTIBODY: CPT

## 2018-10-11 PROCEDURE — 87340 HEPATITIS B SURFACE AG IA: CPT

## 2018-10-11 PROCEDURE — 82390 ASSAY OF CERULOPLASMIN: CPT

## 2018-10-11 PROCEDURE — 86038 ANTINUCLEAR ANTIBODIES: CPT

## 2018-10-11 PROCEDURE — 36415 COLL VENOUS BLD VENIPUNCTURE: CPT | Mod: PO

## 2018-10-11 PROCEDURE — 80076 HEPATIC FUNCTION PANEL: CPT

## 2018-10-12 LAB — ANA SER QL IF: NORMAL

## 2018-11-12 ENCOUNTER — HOSPITAL ENCOUNTER (OUTPATIENT)
Dept: RADIOLOGY | Facility: HOSPITAL | Age: 53
Discharge: HOME | End: 2018-11-12
Attending: FAMILY MEDICINE
Payer: COMMERCIAL

## 2018-11-12 VITALS — HEIGHT: 67 IN | BODY MASS INDEX: 22.44 KG/M2 | WEIGHT: 143 LBS

## 2018-11-12 DIAGNOSIS — Z12.31 ENCOUNTER FOR SCREENING MAMMOGRAM FOR MALIGNANT NEOPLASM OF BREAST: ICD-10-CM

## 2018-11-12 PROCEDURE — 77063 BREAST TOMOSYNTHESIS BI: CPT | Mod: 26,,, | Performed by: RADIOLOGY

## 2018-11-12 PROCEDURE — 77063 BREAST TOMOSYNTHESIS BI: CPT | Mod: TC,PO

## 2018-11-12 PROCEDURE — 77067 SCR MAMMO BI INCL CAD: CPT | Mod: 26,,, | Performed by: RADIOLOGY

## 2018-12-05 ENCOUNTER — OFFICE VISIT (OUTPATIENT)
Dept: FAMILY MEDICINE | Facility: CLINIC | Age: 53
End: 2018-12-05
Payer: COMMERCIAL

## 2018-12-05 VITALS
DIASTOLIC BLOOD PRESSURE: 70 MMHG | HEIGHT: 66 IN | BODY MASS INDEX: 23.59 KG/M2 | OXYGEN SATURATION: 96 % | HEART RATE: 69 BPM | SYSTOLIC BLOOD PRESSURE: 110 MMHG | WEIGHT: 146.81 LBS

## 2018-12-05 DIAGNOSIS — S39.012A STRAIN OF LUMBAR REGION, INITIAL ENCOUNTER: Primary | ICD-10-CM

## 2018-12-05 PROCEDURE — 99999 PR PBB SHADOW E&M-EST. PATIENT-LVL III: CPT | Mod: PBBFAC,,, | Performed by: FAMILY MEDICINE

## 2018-12-05 PROCEDURE — 99213 OFFICE O/P EST LOW 20 MIN: CPT | Mod: S$GLB,,, | Performed by: FAMILY MEDICINE

## 2018-12-05 RX ORDER — IBUPROFEN 600 MG/1
600 TABLET ORAL 3 TIMES DAILY
Qty: 60 TABLET | Refills: 1 | Status: SHIPPED | OUTPATIENT
Start: 2018-12-05 | End: 2020-04-02 | Stop reason: SDUPTHER

## 2018-12-05 RX ORDER — METHOCARBAMOL 750 MG/1
750 TABLET, FILM COATED ORAL 4 TIMES DAILY PRN
Qty: 30 TABLET | Refills: 0 | Status: SHIPPED | OUTPATIENT
Start: 2018-12-05 | End: 2018-12-15

## 2018-12-05 NOTE — PROGRESS NOTES
CHIEF COMPLAINT:  This is a 53-year-old female complaining of lower back pain.    SUBJECTIVE:  Patient complains of over 1 week history of pain on both sides of lower back.  She rates pain 6/10 on the pain scale.  Pain is worse when she crosses her legs or changes position.  She has no history of injury or increased activity although the pain began after cleaning in her house.  She denies radiation of pain to her lower extremities.  She denies numbness, tingling or weakness in limb.  She has been taking ibuprofen 600 mg twice daily with some relief.    ROS:  GENERAL: Patient denies fever, chills, night sweats.  Patient denies weight gain or loss. Patient denies anorexia, fatigue, weakness or swollen glands.  SKIN: Patient denies rash.  LUNGS: Patient denies cough, wheeze or hemoptysis.  CARDIOVASCULAR: Patient denies chest pain, shortness of breath, palpitations, syncope or lower extremity edema.  GI: Patient denies abdominal pain, nausea, vomiting, diarrhea, constipation, blood in stool or melena.  GENITOURINARY: Patient denies pelvic pain, vaginal discharge, itch or odor. Patient denies irregular vaginal bleeding.  Patient denies dysuria, frequency, hematuria, nocturia, urgency or incontinence.  MUSCULOSKELETAL: Patient denies joint pain, swelling, redness or warmth.  NEUROLOGIC: Patient denies headache, vertigo, numbness, tingling, weakness in limb, or abnormality of gait     OBJECTIVE:   GENERAL: Well-developed well-nourished black female alert and oriented x3 in no acute distress.  Memory, judgment and cognition without deficit.  SKIN: Clear without rash.  Normal color and tone.  HEENT: Eyes: Clear conjunctivae.  No scleral icterus.  NECK: Supple, normal range of motion.  LUNGS:  Normal respiratory effort.  BACK: No CVA or spinal tenderness. Normal range of motion in flexion and extension.  EXTREMITIES: Without cyanosis, clubbing or edema.  Distal pulses 2+ and equal.  Normal range of motion in lower  extremities.  No joint effusion, erythema or warmth.  NEUROLOGIC:  Motor strength equal bilaterally.  Sensation normal to touch.  Deep tendon reflexes 2+ and equal.  Gait without abnormality.  No tremor.  Negative SLR.  Negative LESLIE.      ASSESSMENT:  1. Strain of lumbar region, initial encounter      PLAN:   1.  Apply moist heat and/or ice q.i.d. for 15-20 minutes.  2.  Ibuprofen 600 mg 3 times daily with food.  3.  Methocarbamol 750 mg 4 times daily as needed for muscle spasm.  4.  Back stretching exercises.  5.  Follow-up if no improvement or worsening symptoms.

## 2019-01-28 ENCOUNTER — PATIENT MESSAGE (OUTPATIENT)
Dept: FAMILY MEDICINE | Facility: CLINIC | Age: 54
End: 2019-01-28

## 2019-01-29 ENCOUNTER — PATIENT MESSAGE (OUTPATIENT)
Dept: FAMILY MEDICINE | Facility: CLINIC | Age: 54
End: 2019-01-29

## 2019-01-29 DIAGNOSIS — R74.8 ELEVATED LIVER ENZYMES: Primary | ICD-10-CM

## 2019-01-31 ENCOUNTER — TELEPHONE (OUTPATIENT)
Dept: RADIOLOGY | Facility: HOSPITAL | Age: 54
End: 2019-01-31

## 2019-02-01 ENCOUNTER — HOSPITAL ENCOUNTER (OUTPATIENT)
Dept: RADIOLOGY | Facility: HOSPITAL | Age: 54
Discharge: HOME | End: 2019-02-01
Attending: FAMILY MEDICINE
Payer: COMMERCIAL

## 2019-02-01 DIAGNOSIS — R74.8 ELEVATED LIVER ENZYMES: ICD-10-CM

## 2019-02-01 PROCEDURE — 76705 ECHO EXAM OF ABDOMEN: CPT | Mod: TC

## 2019-02-01 PROCEDURE — 76705 US ABDOMEN LIMITED: ICD-10-PCS | Mod: 26,,, | Performed by: RADIOLOGY

## 2019-02-01 PROCEDURE — 76705 ECHO EXAM OF ABDOMEN: CPT | Mod: 26,,, | Performed by: RADIOLOGY

## 2019-02-22 ENCOUNTER — OFFICE VISIT (OUTPATIENT)
Dept: FAMILY MEDICINE | Facility: CLINIC | Age: 54
End: 2019-02-22
Payer: COMMERCIAL

## 2019-02-22 VITALS
HEART RATE: 76 BPM | TEMPERATURE: 98 F | DIASTOLIC BLOOD PRESSURE: 70 MMHG | BODY MASS INDEX: 25.15 KG/M2 | WEIGHT: 156.5 LBS | OXYGEN SATURATION: 99 % | RESPIRATION RATE: 18 BRPM | SYSTOLIC BLOOD PRESSURE: 132 MMHG | HEIGHT: 66 IN

## 2019-02-22 DIAGNOSIS — S49.92XA INJURY OF LEFT SHOULDER, INITIAL ENCOUNTER: Primary | ICD-10-CM

## 2019-02-22 PROCEDURE — 99999 PR PBB SHADOW E&M-EST. PATIENT-LVL III: ICD-10-PCS | Mod: PBBFAC,,, | Performed by: FAMILY MEDICINE

## 2019-02-22 PROCEDURE — 99999 PR PBB SHADOW E&M-EST. PATIENT-LVL III: CPT | Mod: PBBFAC,,, | Performed by: FAMILY MEDICINE

## 2019-02-22 PROCEDURE — 99213 OFFICE O/P EST LOW 20 MIN: CPT | Mod: S$GLB,,, | Performed by: FAMILY MEDICINE

## 2019-02-22 PROCEDURE — 99213 PR OFFICE/OUTPT VISIT, EST, LEVL III, 20-29 MIN: ICD-10-PCS | Mod: S$GLB,,, | Performed by: FAMILY MEDICINE

## 2019-02-22 RX ORDER — PREDNISONE 20 MG/1
TABLET ORAL
Qty: 10 TABLET | Refills: 0 | Status: SHIPPED | OUTPATIENT
Start: 2019-02-22 | End: 2019-10-29

## 2019-02-22 NOTE — PROGRESS NOTES
CHIEF COMPLAINT:  This is a 53-year-old female complaining of left shoulder injury.    SUBJECTIVE:  On February 7, patient jumped up suddenly and twisted left shoulder with immediate pain. She complains of pain in anterior shoulder which has improved minimally over the last 2 weeks.  Patient reports stiffness in left shoulder and pain when she leans on left shoulder.  Patient has been taking ibuprofen 600 mg 3 times daily.  Patient had rotator cuff surgery on left shoulder in December 2017 and was feeling that shoulder was almost back to normal prior to this injury.  She denies numbness, tingling, weakness in limb.    ROS:  GENERAL: Patient denies fever, chills, night sweats.  Patient denies weight gain or loss. Patient denies anorexia, fatigue, weakness or swollen glands.  SKIN: Patient denies rash.  LUNGS: Patient denies cough, wheeze or hemoptysis.  CARDIOVASCULAR: Patient denies chest pain, shortness of breath, palpitations, syncope or lower extremity edema.  GI: Patient denies abdominal pain, nausea, vomiting, diarrhea, constipation, blood in stool or melena.  GENITOURINARY: Patient denies dysuria, frequency, hematuria, nocturia, urgency or incontinence.  MUSCULOSKELETAL: Patient denies joint swelling, redness or warmth.  Patient denies neck or back pain.  NEUROLOGIC: Patient denies headache, vertigo, numbness, tingling, weakness in limb, or abnormality of gait      OBJECTIVE:   GENERAL: Well-developed well-nourished black female alert and oriented x3 in no acute distress.  Memory, judgment and cognition without deficit.  SKIN: Clear without rash.  Normal color and tone.  HEENT: Eyes: Clear conjunctivae.  No scleral icterus.  NECK: Supple, normal range of motion. No spinal or paraspinous muscle spasm or tenderness.  LUNGS:  Normal respiratory effort.  BACK: No CVA or spinal tenderness. Normal range of motion in flexion and extension.  EXTREMITIES: Without cyanosis, clubbing or edema.  Distal pulses 2+ and  equal.  Normal range of motion left shoulder.  No joint effusion, erythema or warmth. Negative impingement sign.  NEUROLOGIC:  Motor strength equal bilaterally.  Sensation normal to touch.  Deep tendon reflexes 2+ and equal.  Gait without abnormality.  No tremor.      ASSESSMENT:  1. Injury of left shoulder, initial encounter      PLAN:   1.  Apply moist heat and/or ice four times daily for 20-30 minutes.  2.  Short prednisone taper starting at 40 mg over one week.  3.  Consider PT.  4.  Follow up if no improvement or worsening symptoms.

## 2019-03-10 RX ORDER — HYDROCHLOROTHIAZIDE 12.5 MG/1
CAPSULE ORAL
Qty: 90 CAPSULE | Refills: 1 | Status: SHIPPED | OUTPATIENT
Start: 2019-03-10 | End: 2019-08-27 | Stop reason: SDUPTHER

## 2019-06-12 ENCOUNTER — PATIENT MESSAGE (OUTPATIENT)
Dept: FAMILY MEDICINE | Facility: CLINIC | Age: 54
End: 2019-06-12

## 2019-08-27 ENCOUNTER — PATIENT MESSAGE (OUTPATIENT)
Dept: FAMILY MEDICINE | Facility: CLINIC | Age: 54
End: 2019-08-27

## 2019-08-27 RX ORDER — HYDROCHLOROTHIAZIDE 12.5 MG/1
12.5 CAPSULE ORAL DAILY
Qty: 90 CAPSULE | Refills: 0 | Status: SHIPPED | OUTPATIENT
Start: 2019-08-27 | End: 2019-09-05

## 2019-09-05 RX ORDER — HYDROCHLOROTHIAZIDE 12.5 MG/1
CAPSULE ORAL
Qty: 90 CAPSULE | Refills: 0 | Status: SHIPPED | OUTPATIENT
Start: 2019-09-05 | End: 2020-02-17 | Stop reason: SDUPTHER

## 2019-09-24 ENCOUNTER — PATIENT MESSAGE (OUTPATIENT)
Dept: FAMILY MEDICINE | Facility: CLINIC | Age: 54
End: 2019-09-24

## 2019-09-24 ENCOUNTER — TELEPHONE (OUTPATIENT)
Dept: FAMILY MEDICINE | Facility: CLINIC | Age: 54
End: 2019-09-24

## 2019-09-24 NOTE — TELEPHONE ENCOUNTER
Patient wants to schedule annual: 10/29/19 @ 11:30 scheduled with patient/vlw  ----- Message from Mindi Busch sent at 9/24/2019 11:26 AM CDT -----  Contact: Patient  Patient states that she was speaking to nurse and was disconnected, please call her back at [phone]. Thank you

## 2019-10-29 ENCOUNTER — LAB VISIT (OUTPATIENT)
Dept: LAB | Facility: HOSPITAL | Age: 54
End: 2019-10-29
Attending: FAMILY MEDICINE
Payer: COMMERCIAL

## 2019-10-29 ENCOUNTER — PATIENT MESSAGE (OUTPATIENT)
Dept: FAMILY MEDICINE | Facility: CLINIC | Age: 54
End: 2019-10-29

## 2019-10-29 ENCOUNTER — OFFICE VISIT (OUTPATIENT)
Dept: FAMILY MEDICINE | Facility: CLINIC | Age: 54
End: 2019-10-29
Payer: COMMERCIAL

## 2019-10-29 VITALS
TEMPERATURE: 97 F | OXYGEN SATURATION: 99 % | HEART RATE: 69 BPM | WEIGHT: 157.63 LBS | HEIGHT: 66 IN | SYSTOLIC BLOOD PRESSURE: 122 MMHG | DIASTOLIC BLOOD PRESSURE: 74 MMHG | BODY MASS INDEX: 25.33 KG/M2

## 2019-10-29 DIAGNOSIS — M54.50 CHRONIC BILATERAL LOW BACK PAIN WITHOUT SCIATICA: Primary | ICD-10-CM

## 2019-10-29 DIAGNOSIS — I10 ESSENTIAL HYPERTENSION: ICD-10-CM

## 2019-10-29 DIAGNOSIS — Z12.31 ENCOUNTER FOR SCREENING MAMMOGRAM FOR MALIGNANT NEOPLASM OF BREAST: ICD-10-CM

## 2019-10-29 DIAGNOSIS — M54.50 CHRONIC BILATERAL LOW BACK PAIN WITHOUT SCIATICA: ICD-10-CM

## 2019-10-29 DIAGNOSIS — Z00.00 PREVENTATIVE HEALTH CARE: ICD-10-CM

## 2019-10-29 DIAGNOSIS — G89.29 CHRONIC BILATERAL LOW BACK PAIN WITHOUT SCIATICA: Primary | ICD-10-CM

## 2019-10-29 DIAGNOSIS — Z00.00 PREVENTATIVE HEALTH CARE: Primary | ICD-10-CM

## 2019-10-29 DIAGNOSIS — G89.29 CHRONIC BILATERAL LOW BACK PAIN WITHOUT SCIATICA: ICD-10-CM

## 2019-10-29 DIAGNOSIS — R73.03 PREDIABETES: ICD-10-CM

## 2019-10-29 DIAGNOSIS — E78.5 HYPERLIPIDEMIA, UNSPECIFIED HYPERLIPIDEMIA TYPE: ICD-10-CM

## 2019-10-29 DIAGNOSIS — Z23 NEED FOR VACCINATION: ICD-10-CM

## 2019-10-29 DIAGNOSIS — G20.A1 PARKINSON'S DISEASE: ICD-10-CM

## 2019-10-29 LAB
BASOPHILS # BLD AUTO: 0.01 K/UL (ref 0–0.2)
BASOPHILS NFR BLD: 0.2 % (ref 0–1.9)
DIFFERENTIAL METHOD: ABNORMAL
EOSINOPHIL # BLD AUTO: 0.1 K/UL (ref 0–0.5)
EOSINOPHIL NFR BLD: 2.6 % (ref 0–8)
ERYTHROCYTE [DISTWIDTH] IN BLOOD BY AUTOMATED COUNT: 13.9 % (ref 11.5–14.5)
ESTIMATED AVG GLUCOSE: 128 MG/DL (ref 68–131)
HBA1C MFR BLD HPLC: 6.1 % (ref 4–5.6)
HCT VFR BLD AUTO: 38.3 % (ref 37–48.5)
HGB BLD-MCNC: 12.2 G/DL (ref 12–16)
IMM GRANULOCYTES # BLD AUTO: 0 K/UL (ref 0–0.04)
IMM GRANULOCYTES NFR BLD AUTO: 0 % (ref 0–0.5)
LYMPHOCYTES # BLD AUTO: 1.9 K/UL (ref 1–4.8)
LYMPHOCYTES NFR BLD: 46.3 % (ref 18–48)
MCH RBC QN AUTO: 28.2 PG (ref 27–31)
MCHC RBC AUTO-ENTMCNC: 31.9 G/DL (ref 32–36)
MCV RBC AUTO: 89 FL (ref 82–98)
MONOCYTES # BLD AUTO: 0.3 K/UL (ref 0.3–1)
MONOCYTES NFR BLD: 7.9 % (ref 4–15)
NEUTROPHILS # BLD AUTO: 1.8 K/UL (ref 1.8–7.7)
NEUTROPHILS NFR BLD: 43 % (ref 38–73)
NRBC BLD-RTO: 0 /100 WBC
PLATELET # BLD AUTO: 242 K/UL (ref 150–350)
PMV BLD AUTO: 12.4 FL (ref 9.2–12.9)
RBC # BLD AUTO: 4.32 M/UL (ref 4–5.4)
WBC # BLD AUTO: 4.19 K/UL (ref 3.9–12.7)

## 2019-10-29 PROCEDURE — 99396 PREV VISIT EST AGE 40-64: CPT | Mod: 25,S$GLB,, | Performed by: FAMILY MEDICINE

## 2019-10-29 PROCEDURE — 99999 PR PBB SHADOW E&M-EST. PATIENT-LVL IV: CPT | Mod: PBBFAC,,, | Performed by: FAMILY MEDICINE

## 2019-10-29 PROCEDURE — 80061 LIPID PANEL: CPT

## 2019-10-29 PROCEDURE — 90471 FLU VACCINE (QUAD) GREATER THAN OR EQUAL TO 3YO PRESERVATIVE FREE IM: ICD-10-PCS | Mod: S$GLB,,, | Performed by: FAMILY MEDICINE

## 2019-10-29 PROCEDURE — 85025 COMPLETE CBC W/AUTO DIFF WBC: CPT

## 2019-10-29 PROCEDURE — 90686 IIV4 VACC NO PRSV 0.5 ML IM: CPT | Mod: S$GLB,,, | Performed by: FAMILY MEDICINE

## 2019-10-29 PROCEDURE — 99999 PR PBB SHADOW E&M-EST. PATIENT-LVL IV: ICD-10-PCS | Mod: PBBFAC,,, | Performed by: FAMILY MEDICINE

## 2019-10-29 PROCEDURE — 84443 ASSAY THYROID STIM HORMONE: CPT

## 2019-10-29 PROCEDURE — 99396 PR PREVENTIVE VISIT,EST,40-64: ICD-10-PCS | Mod: 25,S$GLB,, | Performed by: FAMILY MEDICINE

## 2019-10-29 PROCEDURE — 83036 HEMOGLOBIN GLYCOSYLATED A1C: CPT

## 2019-10-29 PROCEDURE — 80053 COMPREHEN METABOLIC PANEL: CPT

## 2019-10-29 PROCEDURE — 90471 IMMUNIZATION ADMIN: CPT | Mod: S$GLB,,, | Performed by: FAMILY MEDICINE

## 2019-10-29 PROCEDURE — 90686 FLU VACCINE (QUAD) GREATER THAN OR EQUAL TO 3YO PRESERVATIVE FREE IM: ICD-10-PCS | Mod: S$GLB,,, | Performed by: FAMILY MEDICINE

## 2019-10-29 PROCEDURE — 36415 COLL VENOUS BLD VENIPUNCTURE: CPT | Mod: PO

## 2019-10-29 NOTE — PROGRESS NOTES
CHIEF COMPLAINT:  This is a 54-year-old female here for preventive health exam.     SUBJECTIVE:  Patient reports recent fall with laceration to right upper lid which was glued in the ED.  Patient complains of chronic low back pain which has gotten worse with lifting objects such as her grandchild.  Pain is localized to both sides of the lumbar spine.  She also complains of pain extending to hamstring left hamstrings.   She is taking Azilect and Mirapex for Parkinson's disease per her neurologist.  She has a tremor and was told that she had mild disease.  Her hypertension is controlled on HCTZ.  She has dyslipidemia and prediabetes.  She is not exercising regularly.  Patient has gained 14 lb in the last year.  She recently had sleep study which indicated that she had narcolepsy which she rejects.     Eye exam October 2017. Mammogram November 2018. Colonoscopy June 2018, due again in June 2023. Tdap September 2011. Flu vaccine October 2018.     ROS:  GENERAL: Patient denies fever, chills, night sweats. Patient denies weight gain or loss. Patient denies anorexia, fatigue, weakness or swollen glands.  SKIN: Patient denies rash or hair loss.  HEENT: Patient denies sore throat, ear pain, hearing loss, nasal congestion, or runny nose. Patient denies visual disturbance, eye irritation or discharge.  LUNGS: Patient denies cough, wheeze or hemoptysis.  CARDIOVASCULAR: Patient denies chest pain, shortness of breath, palpitations, syncope or lower extremity edema.  GI: Patient denies abdominal pain, nausea, vomiting, diarrhea, constipation, blood in stool or melena.  GENITOURINARY: Patient denies pelvic pain, vaginal discharge, itch or odor. Patient denies irregular vaginal bleeding. Patient denies dysuria, frequency, hematuria, nocturia, urgency or incontinence.  BREASTS: Patient denies breast pain, mass or nipple discharge.  MUSCULOSKELETAL: Patient denies joint pain, swelling, redness or warmth.  NEUROLOGIC: Patient denies  headache, vertigo, paresthesias, weakness in limb, dysarthria, dysphagia or abnormality of gait.  PSYCHIATRIC: Patient denies anxiety, depression, or memory loss.     OBJECTIVE:   GENERAL: Well-developed well-nourished slightly overweight black female alert and oriented x3, in no acute distress. Memory, judgment and cognition without deficit.   SKIN: Clear without rash. Normal color and tone.   HEENT: Eyes: Clear conjunctivae. No scleral icterus. Pupils equal reactive to light and accommodation. Ears: Clear TMs. Clear canals. Nose: Without congestion. Pharynx: Without injection or exudates.   NECK: Supple, normal range of motion. No masses, lymphadenopathy or enlarged thyroid. No JVD. Carotids 2+ and equal. No bruits.   LUNGS: Clear to auscultation. Normal respiratory effort.   CARDIOVASCULAR: Regular rhythm, normal S1, S2 without murmur, gallop or rub.   BACK: No CVA or spinal tenderness.   BREASTS: No masses, tenderness or nipple discharge.   ABDOMEN: Normal appearance. Active bowel sounds. Soft, nontender without mass or organomegaly. No rebound or guarding. Well-healed incisional scars.   EXTREMITIES: Without cyanosis, clubbing or edema. Distal pulses 2+ and equal. Normal range of motion in all extremities. No joint effusion, erythema or warmth.   NEUROLOGIC: Cranial nerves II through XII without deficit. Motor strength equal bilaterally. Sensation normal to touch. Deep tendon reflexes 2+ and equal. Gait without abnormality. No tremor. Negative cerebellar signs.   PELVIC: External: Without lesions or inflammation. Vaginal: Cuff intact. Bimanual: Normal size and shape. Adnexa: Non-tender, without masses. Rectovaginal: Confirms, heme-negative stool x2.    ASSESSMENT:  1. Preventative health care    2. Parkinson's disease    3. Prediabetes    4. Hyperlipidemia, unspecified hyperlipidemia type    5. Essential hypertension    6. Encounter for screening mammogram for malignant neoplasm of breast    7. Need for  vaccination    8. Chronic bilateral low back pain without sciatica      PLAN:  1.  Weight reduction. Exercise regularly.  2.  Age-appropriate counseling.  3.  Fasting lab.  4.  Mammogram.  5.  Physical therapy.  6.  Influenza vaccine.  7.  Refill medications as needed.  8.  Follow-up annually.  4.  Influenza vaccine.

## 2019-10-30 LAB
ALBUMIN SERPL BCP-MCNC: 4.2 G/DL (ref 3.5–5.2)
ALP SERPL-CCNC: 85 U/L (ref 55–135)
ALT SERPL W/O P-5'-P-CCNC: 47 U/L (ref 10–44)
ANION GAP SERPL CALC-SCNC: 7 MMOL/L (ref 8–16)
AST SERPL-CCNC: 45 U/L (ref 10–40)
BILIRUB SERPL-MCNC: 0.3 MG/DL (ref 0.1–1)
BUN SERPL-MCNC: 15 MG/DL (ref 6–20)
CALCIUM SERPL-MCNC: 10.2 MG/DL (ref 8.7–10.5)
CHLORIDE SERPL-SCNC: 102 MMOL/L (ref 95–110)
CHOLEST SERPL-MCNC: 275 MG/DL (ref 120–199)
CHOLEST/HDLC SERPL: 3.5 {RATIO} (ref 2–5)
CO2 SERPL-SCNC: 30 MMOL/L (ref 23–29)
CREAT SERPL-MCNC: 1 MG/DL (ref 0.5–1.4)
EST. GFR  (AFRICAN AMERICAN): >60 ML/MIN/1.73 M^2
EST. GFR  (NON AFRICAN AMERICAN): >60 ML/MIN/1.73 M^2
GLUCOSE SERPL-MCNC: 97 MG/DL (ref 70–110)
HDLC SERPL-MCNC: 79 MG/DL (ref 40–75)
HDLC SERPL: 28.7 % (ref 20–50)
LDLC SERPL CALC-MCNC: 176.8 MG/DL (ref 63–159)
NONHDLC SERPL-MCNC: 196 MG/DL
POTASSIUM SERPL-SCNC: 3.9 MMOL/L (ref 3.5–5.1)
PROT SERPL-MCNC: 7.9 G/DL (ref 6–8.4)
SODIUM SERPL-SCNC: 139 MMOL/L (ref 136–145)
TRIGL SERPL-MCNC: 96 MG/DL (ref 30–150)
TSH SERPL DL<=0.005 MIU/L-ACNC: 0.8 UIU/ML (ref 0.4–4)

## 2019-11-01 ENCOUNTER — PATIENT MESSAGE (OUTPATIENT)
Dept: FAMILY MEDICINE | Facility: CLINIC | Age: 54
End: 2019-11-01

## 2019-11-07 ENCOUNTER — PATIENT MESSAGE (OUTPATIENT)
Dept: FAMILY MEDICINE | Facility: CLINIC | Age: 54
End: 2019-11-07

## 2019-11-07 DIAGNOSIS — M79.606 PAIN OF LOWER EXTREMITY, UNSPECIFIED LATERALITY: Primary | ICD-10-CM

## 2019-11-14 ENCOUNTER — HOSPITAL ENCOUNTER (OUTPATIENT)
Dept: RADIOLOGY | Facility: HOSPITAL | Age: 54
Discharge: HOME | End: 2019-11-14
Attending: FAMILY MEDICINE
Payer: COMMERCIAL

## 2019-11-14 DIAGNOSIS — Z12.31 ENCOUNTER FOR SCREENING MAMMOGRAM FOR MALIGNANT NEOPLASM OF BREAST: ICD-10-CM

## 2019-11-14 PROCEDURE — 77067 SCR MAMMO BI INCL CAD: CPT | Mod: 26,,, | Performed by: RADIOLOGY

## 2019-11-14 PROCEDURE — 77063 BREAST TOMOSYNTHESIS BI: CPT | Mod: 26,,, | Performed by: RADIOLOGY

## 2019-11-14 PROCEDURE — 77063 BREAST TOMOSYNTHESIS BI: CPT | Mod: TC

## 2019-11-14 PROCEDURE — 77063 MAMMO DIGITAL SCREENING BILAT WITH TOMOSYNTHESIS_CAD: ICD-10-PCS | Mod: 26,,, | Performed by: RADIOLOGY

## 2019-11-14 PROCEDURE — 77067 MAMMO DIGITAL SCREENING BILAT WITH TOMOSYNTHESIS_CAD: ICD-10-PCS | Mod: 26,,, | Performed by: RADIOLOGY

## 2020-01-08 ENCOUNTER — TELEPHONE (OUTPATIENT)
Dept: FAMILY MEDICINE | Facility: CLINIC | Age: 55
End: 2020-01-08

## 2020-01-08 NOTE — TELEPHONE ENCOUNTER
Faxed signed Therapy Claim Supplement form back to [facility]

## 2020-02-03 ENCOUNTER — HOSPITAL ENCOUNTER (OUTPATIENT)
Dept: RADIOLOGY | Facility: HOSPITAL | Age: 55
Discharge: HOME | End: 2020-02-03
Attending: FAMILY MEDICINE
Payer: COMMERCIAL

## 2020-02-03 ENCOUNTER — OFFICE VISIT (OUTPATIENT)
Dept: FAMILY MEDICINE | Facility: CLINIC | Age: 55
End: 2020-02-03
Payer: COMMERCIAL

## 2020-02-03 VITALS
SYSTOLIC BLOOD PRESSURE: 124 MMHG | BODY MASS INDEX: 25.79 KG/M2 | HEIGHT: 66 IN | HEART RATE: 64 BPM | DIASTOLIC BLOOD PRESSURE: 72 MMHG | RESPIRATION RATE: 18 BRPM | WEIGHT: 160.5 LBS | TEMPERATURE: 98 F | OXYGEN SATURATION: 99 %

## 2020-02-03 DIAGNOSIS — G89.29 CHRONIC BILATERAL LOW BACK PAIN WITHOUT SCIATICA: Primary | ICD-10-CM

## 2020-02-03 DIAGNOSIS — M54.50 CHRONIC BILATERAL LOW BACK PAIN WITHOUT SCIATICA: Primary | ICD-10-CM

## 2020-02-03 PROCEDURE — 72100 X-RAY EXAM L-S SPINE 2/3 VWS: CPT | Mod: TC,FY,PO

## 2020-02-03 PROCEDURE — 99999 PR PBB SHADOW E&M-EST. PATIENT-LVL III: ICD-10-PCS | Mod: PBBFAC,,, | Performed by: FAMILY MEDICINE

## 2020-02-03 PROCEDURE — 72100 X-RAY EXAM L-S SPINE 2/3 VWS: CPT | Mod: 26,,, | Performed by: RADIOLOGY

## 2020-02-03 PROCEDURE — 72100 XR LUMBAR SPINE AP AND LATERAL: ICD-10-PCS | Mod: 26,,, | Performed by: RADIOLOGY

## 2020-02-03 PROCEDURE — 99213 PR OFFICE/OUTPT VISIT, EST, LEVL III, 20-29 MIN: ICD-10-PCS | Mod: S$GLB,,, | Performed by: FAMILY MEDICINE

## 2020-02-03 PROCEDURE — 99213 OFFICE O/P EST LOW 20 MIN: CPT | Mod: S$GLB,,, | Performed by: FAMILY MEDICINE

## 2020-02-03 PROCEDURE — 99999 PR PBB SHADOW E&M-EST. PATIENT-LVL III: CPT | Mod: PBBFAC,,, | Performed by: FAMILY MEDICINE

## 2020-02-03 RX ORDER — PREDNISONE 20 MG/1
TABLET ORAL
Qty: 10 TABLET | Refills: 0 | Status: SHIPPED | OUTPATIENT
Start: 2020-02-03 | End: 2020-03-04

## 2020-02-03 NOTE — PROGRESS NOTES
CHIEF COMPLAINT:  This is a 54-year-old female complaining of persistent lower back pain.    SUBJECTIVE:  Patient has had problems with lower back for over 1 year.  Most recent flare-up occurred in October.  Patient has completed physical therapy with some improvement, however results did not last.  She has been taking ibuprofen 600 mg twice daily for pain.  She is concerned because she has been doing physical work in her house such as painting which entails bending, squatting and climbing.  Pain is located across her lower back and radiates at times to  her sides.  Pain does not radiate into lower extremities.  Patient denies numbness, tingling or weakness in limb.    ROS:  GENERAL: Patient denies fever, chills, night sweats. Patient denies weight gain or loss. Patient denies anorexia, fatigue, weakness or swollen glands.  SKIN: Patient denies rash.  LUNGS: Patient denies cough, wheeze or hemoptysis.  CARDIOVASCULAR: Patient denies chest pain, shortness of breath, palpitations, syncope or lower extremity edema.  GI: Patient denies abdominal pain, nausea, vomiting, diarrhea, constipation, blood in stool or melena.  GENITOURINARY: Patient denies dysuria, frequency, hematuria, nocturia, urgency or incontinence.  MUSCULOSKELETAL: Patient denies joint pain, swelling, redness or warmth.  Positive for back pain.  NEUROLOGIC: Patient denies headache, vertigo, paresthesias, weakness in limb, or abnormality of gait.     OBJECTIVE:   GENERAL: Well-developed well-nourished slightly overweight black female alert and oriented x3, in no acute distress. Memory, judgment and cognition without deficit.   SKIN: Clear without rash. Normal color and tone.   HEENT: Eyes: Clear conjunctivae. No scleral icterus.   NECK: Supple, normal range of motion.   LUNGS: Clear to auscultation. Normal respiratory effort.   CARDIOVASCULAR: Regular rhythm, normal S1, S2 without murmur, gallop or rub.   BACK: No CVA or spinal tenderness. Normal range of  motion flexion and extension.  EXTREMITIES: Without cyanosis, clubbing or edema. Distal pulses 2+ and equal. Normal range of motion in lower extremities. No joint effusion, erythema or warmth.   NEUROLOGIC:  Motor strength equal bilaterally. Sensation normal to touch. Deep tendon reflexes 2+ and equal. Gait without abnormality.    ASSESSMENT:  1. Chronic bilateral low back pain without sciatica      PLAN:   1.  Lumbar spine x-rays.  2.  Apply moist heat under ice q.i.d. for 15-20 minutes.  3.  Continue back stretching and strengthening exercises.  4.  Short prednisone taper starting at 40 mg over 1 week.  5.  Follow up if no improvement or worsening symptoms.    This note is generated with speech recognition software and is subject to transcription error and sound alike phrases that may be missed by proofreading.

## 2020-02-09 ENCOUNTER — PATIENT MESSAGE (OUTPATIENT)
Dept: FAMILY MEDICINE | Facility: CLINIC | Age: 55
End: 2020-02-09

## 2020-02-16 ENCOUNTER — PATIENT MESSAGE (OUTPATIENT)
Dept: FAMILY MEDICINE | Facility: CLINIC | Age: 55
End: 2020-02-16

## 2020-02-17 RX ORDER — HYDROCHLOROTHIAZIDE 12.5 MG/1
CAPSULE ORAL
Qty: 90 CAPSULE | Refills: 2 | Status: SHIPPED | OUTPATIENT
Start: 2020-02-17 | End: 2020-10-30 | Stop reason: SDUPTHER

## 2020-02-17 RX ORDER — HYDROCHLOROTHIAZIDE 12.5 MG/1
12.5 CAPSULE ORAL DAILY
Qty: 90 CAPSULE | Refills: 2 | Status: SHIPPED | OUTPATIENT
Start: 2020-02-17 | End: 2020-02-17

## 2020-03-04 ENCOUNTER — OFFICE VISIT (OUTPATIENT)
Dept: FAMILY MEDICINE | Facility: CLINIC | Age: 55
End: 2020-03-04
Payer: COMMERCIAL

## 2020-03-04 VITALS
DIASTOLIC BLOOD PRESSURE: 78 MMHG | WEIGHT: 159.63 LBS | HEART RATE: 76 BPM | OXYGEN SATURATION: 99 % | RESPIRATION RATE: 18 BRPM | TEMPERATURE: 98 F | HEIGHT: 66 IN | SYSTOLIC BLOOD PRESSURE: 126 MMHG | BODY MASS INDEX: 25.66 KG/M2

## 2020-03-04 DIAGNOSIS — N39.0 ACUTE UTI (URINARY TRACT INFECTION): Primary | ICD-10-CM

## 2020-03-04 DIAGNOSIS — R10.2 SUPRAPUBIC PAIN: ICD-10-CM

## 2020-03-04 LAB
BILIRUB SERPL-MCNC: NEGATIVE MG/DL
BLOOD URINE, POC: 50
COLOR, POC UA: NORMAL
GLUCOSE UR QL STRIP: NORMAL
KETONES UR QL STRIP: NEGATIVE
LEUKOCYTE ESTERASE URINE, POC: NORMAL
NITRITE, POC UA: NEGATIVE
PH, POC UA: 6
PROTEIN, POC: NORMAL
SPECIFIC GRAVITY, POC UA: 1.1
UROBILINOGEN, POC UA: NORMAL

## 2020-03-04 PROCEDURE — 81002 POCT URINE DIPSTICK WITHOUT MICROSCOPE: ICD-10-PCS | Mod: S$GLB,,, | Performed by: REGISTERED NURSE

## 2020-03-04 PROCEDURE — 99213 OFFICE O/P EST LOW 20 MIN: CPT | Mod: 25,S$GLB,, | Performed by: REGISTERED NURSE

## 2020-03-04 PROCEDURE — 81002 URINALYSIS NONAUTO W/O SCOPE: CPT | Mod: S$GLB,,, | Performed by: REGISTERED NURSE

## 2020-03-04 PROCEDURE — 99999 PR PBB SHADOW E&M-EST. PATIENT-LVL IV: CPT | Mod: PBBFAC,,, | Performed by: REGISTERED NURSE

## 2020-03-04 PROCEDURE — 99999 PR PBB SHADOW E&M-EST. PATIENT-LVL IV: ICD-10-PCS | Mod: PBBFAC,,, | Performed by: REGISTERED NURSE

## 2020-03-04 PROCEDURE — 99213 PR OFFICE/OUTPT VISIT, EST, LEVL III, 20-29 MIN: ICD-10-PCS | Mod: 25,S$GLB,, | Performed by: REGISTERED NURSE

## 2020-03-04 RX ORDER — EPINEPHRINE 0.3 MG/.3ML
INJECTION SUBCUTANEOUS
COMMUNITY
End: 2020-10-30

## 2020-03-04 RX ORDER — NITROFURANTOIN 25; 75 MG/1; MG/1
100 CAPSULE ORAL 2 TIMES DAILY
Qty: 14 CAPSULE | Refills: 0 | Status: SHIPPED | OUTPATIENT
Start: 2020-03-04 | End: 2020-03-11

## 2020-03-04 NOTE — LETTER
March 4, 2020    Rosalva Ken  [address]  [place] [address]             [facility]  Family Medicine  [address] [place] [address]  Phone: [phone]  Fax: [phone]   March 4, 2020     Patient: Rosalva Ken   YOB: 1965   Date of Visit: 3/4/2020       To Whom it May Concern:    Rosalva Ken was seen in my clinic on 3/4/2020. She may return to work on 03/05/2020.    Please excuse her from any classes or work missed.    If you have any questions or concerns, please don't hesitate to call.    Sincerely,         Yoshi Neves NP

## 2020-03-04 NOTE — PROGRESS NOTES
Subjective:       Patient ID: Rosalva Ken is a 54 y.o. female.    Chief Complaint   Patient presents with    Abdominal Pain       HPI    Rosalva Ken is here today with c/o pelvic/stomach pain since last week.  Does c/o pain w/ urination.  Drinks about 60 oz water daily.  Urine has not been smelly or cloudy.      Review of Systems   Constitutional: Negative for chills and fever.   Gastrointestinal: Positive for abdominal pain. Negative for constipation, diarrhea, nausea and vomiting.   Genitourinary: Positive for dysuria and pelvic pain. Negative for frequency and hematuria.   Musculoskeletal: Positive for myalgias. Negative for arthralgias.   Neurological: Negative.  Negative for headaches.         Review of patient's allergies indicates:   Allergen Reactions    Allegra [fexofenadine] Palpitations    Allegra-d 12 hour [fexofenadine-pseudoephedrine] Palpitations         Patient Active Problem List   Diagnosis    Essential hypertension    Hyperlipidemia    Seasonal allergies    Parkinson's disease    Prediabetes       Medication List with Changes/Refills   Current Medications    AZELASTINE (ASTELIN) 137 MCG (0.1 %) NASAL SPRAY    1 spray by Nasal route 2 (two) times daily.    BD ALLERGY SYRINGE 1 ML 28 GAUGE SYRG        EPINEPHRINE (EPIPEN) 0.3 MG/0.3 ML ATIN    as directed    HYDROCHLOROTHIAZIDE (MICROZIDE) 12.5 MG CAPSULE    TAKE ONE CAPSULE BY MOUTH DAILY    IBUPROFEN (ADVIL,MOTRIN) 600 MG TABLET    Take 1 tablet (600 mg total) by mouth 3 (three) times daily.    MULTIVITAMIN CAPSULE    Take 1 capsule by mouth once daily.    PRAMIPEXOLE (MIRAPEX) 0.5 MG TABLET    Take 0.75 mg by mouth 3 (three) times daily.     RASAGILINE (AZILECT) 1 MG TAB    Take 1 mg by mouth once daily.   Discontinued Medications    PREDNISONE (DELTASONE) 20 MG TABLET    Take 2 tablets daily for 3 days, then 1 tablet daily for 3 days, then 1/2 tablet daily for 2 days.             Past medical, surgical, family and  "social histories have been reviewed today.        Objective:     Vitals:    03/04/20 0857   BP: 126/78   Pulse: 76   Resp: 18   Temp: 97.5 °F (36.4 °C)   TempSrc: Oral   SpO2: 99%   Weight: 72.4 kg (159 lb 9.8 oz)   Height: 5' 6" (1.676 m)   PainSc:   4   PainLoc: Back       Estimated body mass index is 25.76 kg/m² as calculated from the following:    Height as of this encounter: 5' 6" (1.676 m).    Weight as of this encounter: 72.4 kg (159 lb 9.8 oz).      Physical Exam   Constitutional: She is oriented to person, place, and time. She appears well-developed and well-nourished.   Abdominal: She exhibits no distension. There is tenderness in the suprapubic area. There is no guarding and no CVA tenderness.   Neurological: She is alert and oriented to person, place, and time.   Vitals reviewed.      Urine dipstick shows positive for leukocytes, red blood cells.      Diagnosis       1. Acute UTI (urinary tract infection)    2. Suprapubic pain          Assessment/ Plan     Acute UTI (urinary tract infection)  -     nitrofurantoin, macrocrystal-monohydrate, (MACROBID) 100 MG capsule; Take 1 capsule (100 mg total) by mouth 2 (two) times daily. for 7 days  Dispense: 14 capsule; Refill: 0    Suprapubic pain  -     POCT urine dipstick without microscope  -     nitrofurantoin, macrocrystal-monohydrate, (MACROBID) 100 MG capsule; Take 1 capsule (100 mg total) by mouth 2 (two) times daily. for 7 days  Dispense: 14 capsule; Refill: 0      Infection triggers and prevention discussed.  Follow-up in clinic as needed.        Patient Care Team:  Lalita Patterson MD as PCP - General (Family Medicine)  Angel Myers MD as Consulting Physician (Otolaryngology)  Cristian Howard II, MD as Consulting Physician (Neurology)  Farooq Browne LPN as Care Coordinator (Internal Medicine)      IVETT Chauhan  [facility]   "

## 2020-03-08 ENCOUNTER — PATIENT MESSAGE (OUTPATIENT)
Dept: FAMILY MEDICINE | Facility: CLINIC | Age: 55
End: 2020-03-08

## 2020-03-09 NOTE — TELEPHONE ENCOUNTER
Called and spoke with patient, and she advised that she's still taking the antibiotic that was prescribe.

## 2020-03-10 DIAGNOSIS — Z87.440 RECENT URINARY TRACT INFECTION: Primary | ICD-10-CM

## 2020-03-16 ENCOUNTER — LAB VISIT (OUTPATIENT)
Dept: LAB | Facility: HOSPITAL | Age: 55
End: 2020-03-16
Attending: FAMILY MEDICINE
Payer: COMMERCIAL

## 2020-03-16 ENCOUNTER — PATIENT MESSAGE (OUTPATIENT)
Dept: FAMILY MEDICINE | Facility: CLINIC | Age: 55
End: 2020-03-16

## 2020-03-16 DIAGNOSIS — Z87.440 RECENT URINARY TRACT INFECTION: ICD-10-CM

## 2020-03-16 LAB
ALBUMIN SERPL BCP-MCNC: 3.7 G/DL (ref 3.5–5.2)
ANION GAP SERPL CALC-SCNC: 8 MMOL/L (ref 8–16)
BUN SERPL-MCNC: 16 MG/DL (ref 6–20)
CALCIUM SERPL-MCNC: 9.4 MG/DL (ref 8.7–10.5)
CHLORIDE SERPL-SCNC: 102 MMOL/L (ref 95–110)
CO2 SERPL-SCNC: 31 MMOL/L (ref 23–29)
CREAT SERPL-MCNC: 1 MG/DL (ref 0.5–1.4)
EST. GFR  (AFRICAN AMERICAN): >60 ML/MIN/1.73 M^2
EST. GFR  (NON AFRICAN AMERICAN): >60 ML/MIN/1.73 M^2
GLUCOSE SERPL-MCNC: 95 MG/DL (ref 70–110)
PHOSPHATE SERPL-MCNC: 3.4 MG/DL (ref 2.7–4.5)
POTASSIUM SERPL-SCNC: 4.2 MMOL/L (ref 3.5–5.1)
SODIUM SERPL-SCNC: 141 MMOL/L (ref 136–145)

## 2020-03-16 PROCEDURE — 36415 COLL VENOUS BLD VENIPUNCTURE: CPT | Mod: PO

## 2020-03-16 PROCEDURE — 80069 RENAL FUNCTION PANEL: CPT

## 2020-03-16 NOTE — TELEPHONE ENCOUNTER
Called and spoke with patient in reference to following up and getting labs done. She said that she will go and get them done.

## 2020-03-21 ENCOUNTER — PATIENT MESSAGE (OUTPATIENT)
Dept: FAMILY MEDICINE | Facility: CLINIC | Age: 55
End: 2020-03-21

## 2020-04-02 RX ORDER — IBUPROFEN 600 MG/1
600 TABLET ORAL 3 TIMES DAILY
Qty: 60 TABLET | Refills: 1 | Status: SHIPPED | OUTPATIENT
Start: 2020-04-02 | End: 2020-10-30

## 2020-05-18 ENCOUNTER — NURSE TRIAGE (OUTPATIENT)
Dept: ADMINISTRATIVE | Facility: CLINIC | Age: 55
End: 2020-05-18

## 2020-05-18 NOTE — TELEPHONE ENCOUNTER
Reason for Disposition   [1] Fever (or feeling feverish) OR cough occurs AND [2] living in area with major community spread AND [3] testing being done in the community for symptoms    Additional Information   Negative: SEVERE difficulty breathing (e.g., struggling for each breath, speaks in single words)   Negative: Bluish (or gray) lips or face now   Negative: Sounds like a life-threatening emergency to the triager   Negative: [1] Difficulty breathing occurs AND [2] within 14 days of COVID-19 EXPOSURE (Close Contact)   Negative: Patient sounds very sick or weak to the triager   Negative: [1] Fever (or feeling feverish) OR cough AND [2] within 14 Days of COVID-19 EXPOSURE (Close Contact)   Negative: [1] Fever (or feeling feverish) OR cough occurs AND [2] within 14 days of travel from another country (international travel)   Negative: [1] Fever (or feeling feverish) OR cough occurs AND [2] within 14 days of travel from a city or area with major community spread    Protocols used: CORONAVIRUS (COVID-19) EXPOSURE-A-AH    Pt stated she has low grade fever and diarrhea and want to be tested for COVID. Advised of Urgent Care location near her that does screening and testing. Advised Provider at site determines if test will be administered. Pt verbalized understanding

## 2020-05-19 ENCOUNTER — PATIENT MESSAGE (OUTPATIENT)
Dept: FAMILY MEDICINE | Facility: CLINIC | Age: 55
End: 2020-05-19

## 2020-05-19 NOTE — TELEPHONE ENCOUNTER
Reason for Disposition   [1] COVID-19 infection diagnosed or suspected AND [2] mild symptoms (fever, cough) AND [3] no trouble breathing or other complications    Additional Information   Negative: SEVERE difficulty breathing (e.g., struggling for each breath, speaks in single words)   Negative: Difficult to awaken or acting confused (e.g., disoriented, slurred speech)   Negative: Bluish (or gray) lips or face now   Negative: Shock suspected (e.g., cold/pale/clammy skin, too weak to stand, low BP, rapid pulse)   Negative: Sounds like a life-threatening emergency to the triager   Negative: SEVERE or constant chest pain (Exception: mild central chest pain, present only when coughing)   Negative: MODERATE difficulty breathing (e.g., speaks in phrases, SOB even at rest, pulse 100-120)   Negative: Patient sounds very sick or weak to the triager   Negative: MILD difficulty breathing (e.g., minimal/no SOB at rest, SOB with walking, pulse <100)   Negative: Chest pain   Negative: [1] Fever > 101 F (38.3 C) AND [2] age > 60   Negative: Fever > 103 F (39.4 C)   Negative: [1] Fever > 100.0 F (37.8 C) AND [2] bedridden (e.g., nursing home patient, CVA, chronic illness, recovering from surgery)   Negative: HIGH RISK patient (e.g., age > 64 years, diabetes, heart or lung disease, weak immune system)   Negative: Fever present > 3 days (72 hours)   Negative: [1] Fever returns after gone for over 24 hours AND [2] symptoms worse or not improved   Negative: [1] Continuous (nonstop) coughing interferes with work or school AND [2] no improvement using cough treatment per protocol   Negative: Cough present > 3 weeks    Protocols used: CORONAVIRUS (COVID-19) DIAGNOSED OR [id number]

## 2020-06-02 ENCOUNTER — OFFICE VISIT (OUTPATIENT)
Dept: FAMILY MEDICINE | Facility: CLINIC | Age: 55
End: 2020-06-02
Payer: COMMERCIAL

## 2020-06-02 VITALS
TEMPERATURE: 98 F | WEIGHT: 157.19 LBS | HEART RATE: 82 BPM | OXYGEN SATURATION: 96 % | SYSTOLIC BLOOD PRESSURE: 124 MMHG | BODY MASS INDEX: 25.26 KG/M2 | RESPIRATION RATE: 18 BRPM | DIASTOLIC BLOOD PRESSURE: 76 MMHG | HEIGHT: 66 IN

## 2020-06-02 DIAGNOSIS — G89.29 CHRONIC RIGHT-SIDED LOW BACK PAIN WITHOUT SCIATICA: Primary | ICD-10-CM

## 2020-06-02 DIAGNOSIS — M54.50 CHRONIC RIGHT-SIDED LOW BACK PAIN WITHOUT SCIATICA: Primary | ICD-10-CM

## 2020-06-02 PROCEDURE — 99999 PR PBB SHADOW E&M-EST. PATIENT-LVL III: ICD-10-PCS | Mod: PBBFAC,,, | Performed by: FAMILY MEDICINE

## 2020-06-02 PROCEDURE — 99999 PR PBB SHADOW E&M-EST. PATIENT-LVL III: CPT | Mod: PBBFAC,,, | Performed by: FAMILY MEDICINE

## 2020-06-02 PROCEDURE — 96372 THER/PROPH/DIAG INJ SC/IM: CPT | Mod: S$GLB,,, | Performed by: FAMILY MEDICINE

## 2020-06-02 PROCEDURE — 99213 PR OFFICE/OUTPT VISIT, EST, LEVL III, 20-29 MIN: ICD-10-PCS | Mod: 25,S$GLB,, | Performed by: FAMILY MEDICINE

## 2020-06-02 PROCEDURE — 99213 OFFICE O/P EST LOW 20 MIN: CPT | Mod: 25,S$GLB,, | Performed by: FAMILY MEDICINE

## 2020-06-02 PROCEDURE — 96372 PR INJECTION,THERAP/PROPH/DIAG2ST, IM OR SUBCUT: ICD-10-PCS | Mod: S$GLB,,, | Performed by: FAMILY MEDICINE

## 2020-06-02 RX ORDER — AZELASTINE HYDROCHLORIDE, FLUTICASONE PROPIONATE 137; 50 UG/1; UG/1
SPRAY, METERED NASAL
COMMUNITY
End: 2020-10-30

## 2020-06-02 RX ORDER — CARBIDOPA AND LEVODOPA 25; 100 MG/1; MG/1
TABLET ORAL
COMMUNITY
Start: 2020-05-18

## 2020-06-02 RX ORDER — METHYLPREDNISOLONE ACETATE 80 MG/ML
80 INJECTION, SUSPENSION INTRA-ARTICULAR; INTRALESIONAL; INTRAMUSCULAR; SOFT TISSUE
Status: COMPLETED | OUTPATIENT
Start: 2020-06-02 | End: 2020-06-02

## 2020-06-02 RX ORDER — LANOLIN ALCOHOL/MO/W.PET/CERES
100 CREAM (GRAM) TOPICAL DAILY
COMMUNITY

## 2020-06-02 RX ADMIN — METHYLPREDNISOLONE ACETATE 80 MG: 80 INJECTION, SUSPENSION INTRA-ARTICULAR; INTRALESIONAL; INTRAMUSCULAR; SOFT TISSUE at 09:06

## 2020-06-02 NOTE — PROGRESS NOTES
CHIEF COMPLAINT:  This is a 55-year-old female complaining of persistent lower back pain.     SUBJECTIVE:  Patient has had problems with lower back for at least 1-1/2 years.  Recently the pain seems to be constant.  The pain involves her lower back mostly on the right side but she does feel discomfort across her lower back.  She also has pain in the right hip at times and has difficulty sleeping on her right side   Patient completed physical therapy in the past with some improvement, however results did not last.  She has been taking ibuprofen with transient relief.  Pain does not radiate into lower extremities.  Patient denies numbness, tingling or weakness in limb.     ROS:  GENERAL: Patient denies fever, chills, night sweats. Patient denies weight gain or loss. Patient denies anorexia, fatigue, weakness or swollen glands.  SKIN: Patient denies rash.  LUNGS: Patient denies cough, wheeze or hemoptysis.  CARDIOVASCULAR: Patient denies chest pain, shortness of breath, palpitations, syncope or lower extremity edema.  GI: Patient denies abdominal pain, nausea, vomiting, diarrhea, constipation, blood in stool or melena.  GENITOURINARY: Patient denies dysuria, frequency, hematuria, nocturia, urgency or incontinence.  MUSCULOSKELETAL: Patient denies joint pain, swelling, redness or warmth.  Positive for back pain.  NEUROLOGIC: Patient denies headache, vertigo, paresthesias, weakness in limb, or abnormality of gait.     OBJECTIVE:   GENERAL: Well-developed well-nourished slightly overweight black female alert and oriented x3, in no acute distress. Memory, judgment and cognition without deficit.   SKIN: Clear without rash. Normal color and tone.   HEENT: Eyes: Clear conjunctivae. No scleral icterus.   NECK: Supple, normal range of motion.   LUNGS: Normal respiratory effort.   BACK: No CVA or spinal tenderness. Normal range of motion flexion and extension.  EXTREMITIES: Without cyanosis, clubbing or edema. Distal pulses 2+  and equal. Normal range of motion in lower extremities. No joint effusion, erythema or warmth.   NEUROLOGIC:  Motor strength equal bilaterally. Sensation normal to touch. Deep tendon reflexes 2+ and equal. Gait without abnormality.  Negative SLR.  Negative LESLIE.    X-ray lumbar spine:  Vertebral body heights and alignment are within normal limits.  There is mild disc height loss at L4-5.  Posterior elements appear grossly intact. No acute fractures or subluxations are demonstrated.  The remaining visualized osseous and soft tissue structures demonstrate no appreciable abnormality.    ASSESSMENT:  1. Chronic right-sided low back pain without sciatica      PLAN:  1.  Apply moist heat and/or ice q.i.d. for 15-20 minutes.  2.  Depo-Medrol 80 mg IM.  3.  Suggested physical medicine consult.  Patient declines at this time.  4.  Patient declines physical therapy.  5.  UA dip.  6.  Follow up if no improvement or worsening symptoms.    This note is generated with speech recognition software and is subject to transcription error and sound alike phrases that may be missed by proofreading.

## 2020-06-08 ENCOUNTER — TELEPHONE (OUTPATIENT)
Dept: FAMILY MEDICINE | Facility: CLINIC | Age: 55
End: 2020-06-08

## 2020-06-08 DIAGNOSIS — G89.29 CHRONIC RIGHT-SIDED LOW BACK PAIN WITHOUT SCIATICA: Primary | ICD-10-CM

## 2020-06-08 DIAGNOSIS — M54.50 CHRONIC RIGHT-SIDED LOW BACK PAIN WITHOUT SCIATICA: Primary | ICD-10-CM

## 2020-06-08 NOTE — TELEPHONE ENCOUNTER
----- Message from Kenyatta Culp sent at 6/8/2020  3:27 PM CDT -----  Contact: Pt  Pt is requesting call back in regards to questions about getting referral to pain management clinics          Pls call back at [phone]

## 2020-06-08 NOTE — TELEPHONE ENCOUNTER
Pt states that she experienced a pain in her left shoulder, arm, and chest last night while lying down and states that it wasn't a shooting pain, but just a pain that was unusual. She wanted to know if that was something she should be worried about or not bc she has never had heart issues or any history of any heart attacks or strokes. She also wanted to know if you can do a referral for physical medicine for her back pain. She states that you and her discussed this at her last appt and would like to go.

## 2020-06-08 NOTE — TELEPHONE ENCOUNTER
The pain is not typical for cardiac disease.  Sounds musculoskeletal.  Follow-up if pain persists or worsens.  Consult made.

## 2020-06-08 NOTE — TELEPHONE ENCOUNTER
----- Message from Mindi Busch sent at 6/8/2020  8:53 AM CDT -----  Contact: Patient  Patient had a pain in her left arm last night and needs to be advised, please call her back at [phone]

## 2020-09-15 ENCOUNTER — TELEPHONE (OUTPATIENT)
Dept: ORTHOPEDICS | Facility: CLINIC | Age: 55
End: 2020-09-15

## 2020-09-15 ENCOUNTER — OFFICE VISIT (OUTPATIENT)
Dept: FAMILY MEDICINE | Facility: CLINIC | Age: 55
End: 2020-09-15
Payer: COMMERCIAL

## 2020-09-15 VITALS
BODY MASS INDEX: 25.26 KG/M2 | WEIGHT: 156.5 LBS | OXYGEN SATURATION: 100 % | TEMPERATURE: 98 F | HEART RATE: 56 BPM | DIASTOLIC BLOOD PRESSURE: 80 MMHG | SYSTOLIC BLOOD PRESSURE: 140 MMHG

## 2020-09-15 DIAGNOSIS — M25.532 LEFT WRIST PAIN: Primary | ICD-10-CM

## 2020-09-15 DIAGNOSIS — M79.89 MASS OF SOFT TISSUE OF WRIST: Primary | ICD-10-CM

## 2020-09-15 DIAGNOSIS — Z23 NEED FOR VACCINATION: ICD-10-CM

## 2020-09-15 PROCEDURE — 90471 IMMUNIZATION ADMIN: CPT | Mod: S$GLB,,, | Performed by: FAMILY MEDICINE

## 2020-09-15 PROCEDURE — 90686 IIV4 VACC NO PRSV 0.5 ML IM: CPT | Mod: S$GLB,,, | Performed by: FAMILY MEDICINE

## 2020-09-15 PROCEDURE — 99213 PR OFFICE/OUTPT VISIT, EST, LEVL III, 20-29 MIN: ICD-10-PCS | Mod: 25,S$GLB,, | Performed by: FAMILY MEDICINE

## 2020-09-15 PROCEDURE — 10160 PNXR ASPIR ABSC HMTMA BULLA: CPT | Mod: S$GLB,,, | Performed by: FAMILY MEDICINE

## 2020-09-15 PROCEDURE — 99213 OFFICE O/P EST LOW 20 MIN: CPT | Mod: 25,S$GLB,, | Performed by: FAMILY MEDICINE

## 2020-09-15 PROCEDURE — 99999 PR PBB SHADOW E&M-EST. PATIENT-LVL V: ICD-10-PCS | Mod: PBBFAC,,, | Performed by: FAMILY MEDICINE

## 2020-09-15 PROCEDURE — 90686 FLU VACCINE (QUAD) GREATER THAN OR EQUAL TO 3YO PRESERVATIVE FREE IM: ICD-10-PCS | Mod: S$GLB,,, | Performed by: FAMILY MEDICINE

## 2020-09-15 PROCEDURE — 90471 FLU VACCINE (QUAD) GREATER THAN OR EQUAL TO 3YO PRESERVATIVE FREE IM: ICD-10-PCS | Mod: S$GLB,,, | Performed by: FAMILY MEDICINE

## 2020-09-15 PROCEDURE — 10160 PR PUNCTURE DRAINAGE, LESION: ICD-10-PCS | Mod: S$GLB,,, | Performed by: FAMILY MEDICINE

## 2020-09-15 PROCEDURE — 99999 PR PBB SHADOW E&M-EST. PATIENT-LVL V: CPT | Mod: PBBFAC,,, | Performed by: FAMILY MEDICINE

## 2020-09-15 RX ORDER — METHOCARBAMOL 750 MG/1
TABLET, FILM COATED ORAL
COMMUNITY
Start: 2020-09-08 | End: 2022-08-26

## 2020-09-15 RX ORDER — PRAMIPEXOLE DIHYDROCHLORIDE 0.25 MG/1
0.5 TABLET ORAL
COMMUNITY
Start: 2020-08-07 | End: 2021-11-29

## 2020-09-15 RX ORDER — AZITHROMYCIN 250 MG/1
TABLET, FILM COATED ORAL
COMMUNITY
Start: 2020-06-17 | End: 2020-10-30

## 2020-09-15 RX ORDER — PREDNISONE 20 MG/1
TABLET ORAL
COMMUNITY
Start: 2020-06-17 | End: 2020-10-30

## 2020-09-21 NOTE — PROGRESS NOTES
CHIEF COMPLAINT:  This is a 55-year-old female complaining of knot on left wrist.    SUBJECTIVE:  Patient complains of sudden onset of nodule on left volar wrist yesterday.  She denies any history of trauma.  She reports recent increase physical activity.  Nodule is not painful, red or warm to touch.  She denies fever or chills.  She denies decreased range of motion in left upper extremity.    ROS:  GENERAL: Patient denies fever, chills, night sweats.  Patient denies weight gain or loss. Patient denies anorexia, fatigue, weakness or swollen glands.  SKIN: Patient denies rash.  LUNGS: Patient denies cough, wheeze or hemoptysis.  CARDIOVASCULAR: Patient denies chest pain, shortness of breath, palpitations, syncope or lower extremity edema.  MUSCULOSKELETAL: Patient denies joint pain, swelling, redness or warmth.  NEUROLOGIC: Patient denies headache, vertigo, numbness, tingling, weakness in limb, or abnormality of gait.    OBJECTIVE:   GENERAL: Well-developed well-nourished black female alert and oriented x3, in no acute distress. Memory, judgment and cognition without deficit.   SKIN: Clear without rash. Normal color and tone.   HEENT: Eyes: Clear conjunctivae. No scleral icterus.   NECK: Supple, normal range of motion.   LUNGS: Normal respiratory effort.   EXTREMITIES: Without cyanosis, clubbing or edema. Distal pulses 2+ and equal.  Soft tissue mass volar left wrist/dorsal forearm.    NEUROLOGIC:  Motor strength equal bilaterally. Sensation normal to touch. Deep tendon reflexes 2+ and equal. Gait without abnormality.      Discussed potential complications of procedure including bleeding, infection, and injury to nerve.  Patient understands and accepts risks.  Verbal consent obtained.    Procedure:  After sterile prep, skin anesthetized with 1% xylocaine with epinephrine.  Attempted aspiration of soft tissue mass with 18 gauge needle and 10 cc syringe.  No return of contents.    ASSESSMENT:  1. Mass of soft tissue of  wrist    2. Need for vaccination        PLAN:   1.  Local care instructions.  2.  Reassurance.  3.  Patient requested further evaluation soft tissue mass.  4.  Orthopedic consult.  5.  Influenza vaccine.    This note is generated with speech recognition software and is subject to transcription error and sound alike phrases that may be missed by proofreading.

## 2020-09-23 ENCOUNTER — OFFICE VISIT (OUTPATIENT)
Dept: ORTHOPEDICS | Facility: CLINIC | Age: 55
End: 2020-09-23
Payer: COMMERCIAL

## 2020-09-23 ENCOUNTER — HOSPITAL ENCOUNTER (OUTPATIENT)
Dept: RADIOLOGY | Facility: HOSPITAL | Age: 55
Discharge: HOME | End: 2020-09-23
Attending: ORTHOPAEDIC SURGERY
Payer: COMMERCIAL

## 2020-09-23 VITALS — WEIGHT: 156 LBS | HEIGHT: 66 IN | BODY MASS INDEX: 25.07 KG/M2

## 2020-09-23 DIAGNOSIS — M67.432 GANGLION OF LEFT WRIST: Primary | ICD-10-CM

## 2020-09-23 DIAGNOSIS — M25.532 LEFT WRIST PAIN: ICD-10-CM

## 2020-09-23 DIAGNOSIS — M79.89 MASS OF SOFT TISSUE OF WRIST: ICD-10-CM

## 2020-09-23 PROCEDURE — 99999 PR PBB SHADOW E&M-EST. PATIENT-LVL III: ICD-10-PCS | Mod: PBBFAC,,, | Performed by: ORTHOPAEDIC SURGERY

## 2020-09-23 PROCEDURE — 73110 XR WRIST COMPLETE 3 VIEWS LEFT: ICD-10-PCS | Mod: 26,LT,, | Performed by: RADIOLOGY

## 2020-09-23 PROCEDURE — 99213 PR OFFICE/OUTPT VISIT, EST, LEVL III, 20-29 MIN: ICD-10-PCS | Mod: S$GLB,,, | Performed by: ORTHOPAEDIC SURGERY

## 2020-09-23 PROCEDURE — 73110 X-RAY EXAM OF WRIST: CPT | Mod: TC,LT

## 2020-09-23 PROCEDURE — 99213 OFFICE O/P EST LOW 20 MIN: CPT | Mod: S$GLB,,, | Performed by: ORTHOPAEDIC SURGERY

## 2020-09-23 PROCEDURE — 73110 X-RAY EXAM OF WRIST: CPT | Mod: 26,LT,, | Performed by: RADIOLOGY

## 2020-09-23 PROCEDURE — 99999 PR PBB SHADOW E&M-EST. PATIENT-LVL III: CPT | Mod: PBBFAC,,, | Performed by: ORTHOPAEDIC SURGERY

## 2020-09-23 NOTE — LETTER
September 23, 2020      Lalita Patterson MD  [address]  [facility] [address]           [facility]  [address] [place] [address]  Phone: [phone]  Fax: [phone]          Patient: Rosalva Stauffer   MR Number: [id number]   YOB: 1965   Date of Visit: 9/23/2020       Dear Dr. Lalita Patterson:    Thank you for referring Rosalva Stauffer to me for evaluation. Attached you will find relevant portions of my assessment and plan of care.    If you have questions, please do not hesitate to call me. I look forward to following Rosalva Stauffer along with you.    Sincerely,    Jose Pastrana MD    Enclosure  CC:  No Recipients    If you would like to receive this communication electronically, please contact [email or web address] or [phone] to request more information on [employer] Link access.    For providers and/or their staff who would like to refer a patient to [facility], please contact us through our one-stop-shop provider referral line, [facility], at [phone].    If you feel you have received this communication in error or would no longer like to receive these types of communications, please e-mail [email or web address]

## 2020-09-23 NOTE — PROGRESS NOTES
Subjective:     Patient ID: Rosalva Stauffer is a 55 y.o. female.    Chief Complaint: Pain of the Left Wrist    The patient is a 55-year-old female with a left wrist radial volar ganglion cyst with negative aspiration by a primary care physician this cyst is not particularly bothersome or enlarging in size.      Past Medical History:   Diagnosis Date    DDD (degenerative disc disease), cervical     Hyperlipidemia     Hypertension     Leiomyoma     Parkinson's disease     Polymenorrhea     Prediabetes     Seasonal allergies      Past Surgical History:   Procedure Laterality Date     SECTION, LOW TRANSVERSE      x1    COLONOSCOPY N/A 2018    Procedure: COLONOSCOPY;  Surgeon: Carlos Oconnell III, MD;  Location: [facility];  Service: Endoscopy;  Laterality: N/A;    GANGLION CYST EXCISION      HERNIA REPAIR      HYSTERECTOMY      Lysis of adhesions and enterotomy closure  2013    ROBOTIC ASSISTED HYSTERECTOMY  2013    With unilateral SO    SHOULDER ARTHROSCOPY W/ ROTATOR CUFF REPAIR Left 2017    Submaxillary gland drainage       Family History   Problem Relation Age of Onset    Hypertension Mother     Cancer Mother     Colon cancer Mother     Diabetes Father     Hypertension Father     Hypertension Sister     Thyroid disease Sister     Hypertension Brother     Hypertension Sister     Hypertension Sister     Hypertension Brother     Cataracts Maternal Grandmother      Social History     Socioeconomic History    Marital status:      Spouse name: Not on file    Number of children: Not on file    Years of education: Not on file    Highest education level: Not on file   Occupational History     Employer: [employer]   Social Needs    Financial resource strain: Not hard at all    Food insecurity     Worry: Never true     Inability: Never true    Transportation needs     Medical: No     Non-medical: No   Tobacco Use    Smoking status: Never Smoker     Smokeless tobacco: Never Used   Substance and Sexual Activity    Alcohol use: No     Alcohol/week: 0.0 standard drinks     Frequency: Monthly or less     Drinks per session: 1 or 2     Binge frequency: Never    Drug use: No    Sexual activity: Not Currently   Lifestyle    Physical activity     Days per week: 3 days     Minutes per session: 110 min    Stress: Not at all   Relationships    Social connections     Talks on phone: More than three times a week     Gets together: Once a week     Attends Jew service: Not on file     Active member of club or organization: No     Attends meetings of clubs or organizations: Never     Relationship status:    Other Topics Concern    Not on file   Social History Narrative    The patient is  and has one child. She has remarried her female partner. 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                Medication List with Changes/Refills   Current Medications    AZELASTINE (ASTELIN) 137 MCG (0.1 %) NASAL SPRAY    1 spray by Nasal route 2 (two) times daily.    AZELASTINE-FLUTICASONE (DYMISTA) 137-50 MCG/SPRAY SPRY NASSAL SPRAY    1 spray in each nostril    AZITHROMYCIN (Z-JULIANO) 250 MG TABLET        CARBIDOPA-LEVODOPA  MG (SINEMET)  MG PER TABLET        CYANOCOBALAMIN (VITAMIN B-12) 1000 MCG TABLET    Take 100 mcg by mouth once daily.    EPINEPHRINE (EPIPEN) 0.3 MG/0.3 ML ATIN    as directed    HYDROCHLOROTHIAZIDE (MICROZIDE) 12.5 MG CAPSULE    TAKE ONE CAPSULE BY MOUTH DAILY    IBUPROFEN (ADVIL,MOTRIN) 600 MG TABLET    Take 1 tablet (600 mg total) by mouth 3 (three) times daily.    METHOCARBAMOL (ROBAXIN) 750 MG TAB    TK 1 T PO BID PRF SPASM    MULTIVITAMIN CAPSULE    Take 1 capsule by mouth once daily.    PRAMIPEXOLE (MIRAPEX) 0.25 MG TABLET    TK 1 TO 2 TS PO 1 TO 2 H BEFORE BEDTIME    PREDNISONE (DELTASONE) 20 MG TABLET    TK 1 T PO ONCE A DAY IN THE MORNING    RASAGILINE (AZILECT) 1 MG TAB    Take 1 mg by mouth once daily.     Review of  patient's allergies indicates:   Allergen Reactions    Allegra [fexofenadine] Palpitations    Allegra-d 12 hour [fexofenadine-pseudoephedrine] Palpitations     Review of Systems   Constitution: Negative for malaise/fatigue.   HENT: Negative for hearing loss.    Eyes: Negative for double vision and visual disturbance.   Cardiovascular: Negative for chest pain.   Respiratory: Negative for shortness of breath.    Endocrine: Negative for cold intolerance.   Hematologic/Lymphatic: Does not bruise/bleed easily.   Skin: Negative for poor wound healing and suspicious lesions.   Musculoskeletal: Negative for gout, joint pain and joint swelling.   Gastrointestinal: Negative for nausea and vomiting.   Genitourinary: Negative for dysuria.   Neurological: Negative for numbness, paresthesias and sensory change.   Psychiatric/Behavioral: Negative for depression, memory loss and substance abuse. The patient is not nervous/anxious.    Allergic/Immunologic: Negative for persistent infections.       Objective:   Body mass index is 25.18 kg/m².  There were no vitals filed for this visit.             General    Constitutional: She is oriented to person, place, and time. She appears well-developed and well-nourished. No distress.   HENT:   Head: Normocephalic.   Eyes: EOM are normal.   Neck: Normal range of motion.   Pulmonary/Chest: Effort normal.   Neurological: She is oriented to person, place, and time.   Psychiatric: She has a normal mood and affect.         Left Hand/Wrist Exam     Inspection   Scars: Wrist - absent Hand -  absent  Effusion: Wrist - absent Hand -  absent    Other     Sensory Exam  Median Distribution: normal  Ulnar Distribution: normal  Radial Distribution: normal    Comments:  The patient has a 2 cm left wrist radial volar ganglion cyst.  Radial artery is palpable.  There are no motor or sensory deficits.          Vascular Exam       Capillary Refill  Left Hand: normal capillary refill      Relevant imaging  results reviewed and interpreted by me, discussed with the patient and / or family today radiographs left wrist were normal  Assessment:     Encounter Diagnoses   Name Primary?    Mass of soft tissue of wrist     Ganglion of left wrist Yes        Plan:     The patient was told if the mass increases in size or become symptomatic to return for consideration of excision.  I told her usually aspiration or cortisone injection is not particularly helpful with the volar cyst.  I told her aspiration for dorsal cyst is reasonable                Disclaimer: This note was prepared using a voice recognition system and is likely to have sound alike errors within the text.

## 2020-10-30 ENCOUNTER — LAB VISIT (OUTPATIENT)
Dept: LAB | Facility: HOSPITAL | Age: 55
End: 2020-10-30
Attending: FAMILY MEDICINE
Payer: COMMERCIAL

## 2020-10-30 ENCOUNTER — OFFICE VISIT (OUTPATIENT)
Dept: FAMILY MEDICINE | Facility: CLINIC | Age: 55
End: 2020-10-30
Payer: COMMERCIAL

## 2020-10-30 VITALS
WEIGHT: 155.63 LBS | HEART RATE: 80 BPM | OXYGEN SATURATION: 98 % | HEIGHT: 66 IN | SYSTOLIC BLOOD PRESSURE: 118 MMHG | TEMPERATURE: 98 F | BODY MASS INDEX: 25.01 KG/M2 | DIASTOLIC BLOOD PRESSURE: 72 MMHG

## 2020-10-30 DIAGNOSIS — Z00.00 PREVENTATIVE HEALTH CARE: Primary | ICD-10-CM

## 2020-10-30 DIAGNOSIS — R73.03 PREDIABETES: ICD-10-CM

## 2020-10-30 DIAGNOSIS — Z12.31 ENCOUNTER FOR SCREENING MAMMOGRAM FOR MALIGNANT NEOPLASM OF BREAST: ICD-10-CM

## 2020-10-30 DIAGNOSIS — E78.5 HYPERLIPIDEMIA, UNSPECIFIED HYPERLIPIDEMIA TYPE: ICD-10-CM

## 2020-10-30 DIAGNOSIS — Z00.00 PREVENTATIVE HEALTH CARE: ICD-10-CM

## 2020-10-30 DIAGNOSIS — G20.A1 PARKINSON'S DISEASE: ICD-10-CM

## 2020-10-30 DIAGNOSIS — Z23 NEED FOR VACCINATION: ICD-10-CM

## 2020-10-30 DIAGNOSIS — I10 ESSENTIAL HYPERTENSION: ICD-10-CM

## 2020-10-30 LAB
ALBUMIN SERPL BCP-MCNC: 4 G/DL (ref 3.5–5.2)
ALP SERPL-CCNC: 96 U/L (ref 55–135)
ALT SERPL W/O P-5'-P-CCNC: 15 U/L (ref 10–44)
ANION GAP SERPL CALC-SCNC: 11 MMOL/L (ref 8–16)
AST SERPL-CCNC: 44 U/L (ref 10–40)
BASOPHILS # BLD AUTO: 0.03 K/UL (ref 0–0.2)
BASOPHILS NFR BLD: 0.7 % (ref 0–1.9)
BILIRUB SERPL-MCNC: 0.4 MG/DL (ref 0.1–1)
BUN SERPL-MCNC: 14 MG/DL (ref 6–20)
CALCIUM SERPL-MCNC: 10 MG/DL (ref 8.7–10.5)
CHLORIDE SERPL-SCNC: 99 MMOL/L (ref 95–110)
CHOLEST SERPL-MCNC: 266 MG/DL (ref 120–199)
CHOLEST/HDLC SERPL: 3.8 {RATIO} (ref 2–5)
CO2 SERPL-SCNC: 27 MMOL/L (ref 23–29)
CREAT SERPL-MCNC: 1 MG/DL (ref 0.5–1.4)
DIFFERENTIAL METHOD: ABNORMAL
EOSINOPHIL # BLD AUTO: 0.2 K/UL (ref 0–0.5)
EOSINOPHIL NFR BLD: 4 % (ref 0–8)
ERYTHROCYTE [DISTWIDTH] IN BLOOD BY AUTOMATED COUNT: 12.8 % (ref 11.5–14.5)
EST. GFR  (AFRICAN AMERICAN): >60 ML/MIN/1.73 M^2
EST. GFR  (NON AFRICAN AMERICAN): >60 ML/MIN/1.73 M^2
GLUCOSE SERPL-MCNC: 103 MG/DL (ref 70–110)
HCT VFR BLD AUTO: 40.7 % (ref 37–48.5)
HDLC SERPL-MCNC: 70 MG/DL (ref 40–75)
HDLC SERPL: 26.3 % (ref 20–50)
HGB BLD-MCNC: 12.4 G/DL (ref 12–16)
IMM GRANULOCYTES # BLD AUTO: 0 K/UL (ref 0–0.04)
IMM GRANULOCYTES NFR BLD AUTO: 0 % (ref 0–0.5)
LDLC SERPL CALC-MCNC: 176.8 MG/DL (ref 63–159)
LYMPHOCYTES # BLD AUTO: 2.1 K/UL (ref 1–4.8)
LYMPHOCYTES NFR BLD: 45.1 % (ref 18–48)
MCH RBC QN AUTO: 29 PG (ref 27–31)
MCHC RBC AUTO-ENTMCNC: 30.5 G/DL (ref 32–36)
MCV RBC AUTO: 95 FL (ref 82–98)
MONOCYTES # BLD AUTO: 0.3 K/UL (ref 0.3–1)
MONOCYTES NFR BLD: 6.8 % (ref 4–15)
NEUTROPHILS # BLD AUTO: 2 K/UL (ref 1.8–7.7)
NEUTROPHILS NFR BLD: 43.4 % (ref 38–73)
NONHDLC SERPL-MCNC: 196 MG/DL
NRBC BLD-RTO: 0 /100 WBC
PLATELET # BLD AUTO: 254 K/UL (ref 150–350)
PMV BLD AUTO: 12.4 FL (ref 9.2–12.9)
POTASSIUM SERPL-SCNC: 4 MMOL/L (ref 3.5–5.1)
PROT SERPL-MCNC: 7.9 G/DL (ref 6–8.4)
RBC # BLD AUTO: 4.27 M/UL (ref 4–5.4)
SODIUM SERPL-SCNC: 137 MMOL/L (ref 136–145)
TRIGL SERPL-MCNC: 96 MG/DL (ref 30–150)
TSH SERPL DL<=0.005 MIU/L-ACNC: 0.66 UIU/ML (ref 0.4–4)
WBC # BLD AUTO: 4.55 K/UL (ref 3.9–12.7)

## 2020-10-30 PROCEDURE — 83036 HEMOGLOBIN GLYCOSYLATED A1C: CPT

## 2020-10-30 PROCEDURE — 90471 IMMUNIZATION ADMIN: CPT | Mod: S$GLB,,, | Performed by: FAMILY MEDICINE

## 2020-10-30 PROCEDURE — 90732 PNEUMOCOCCAL POLYSACCHARIDE VACCINE 23-VALENT =>2YO SQ IM: ICD-10-PCS | Mod: S$GLB,,, | Performed by: FAMILY MEDICINE

## 2020-10-30 PROCEDURE — 90471 PNEUMOCOCCAL POLYSACCHARIDE VACCINE 23-VALENT =>2YO SQ IM: ICD-10-PCS | Mod: S$GLB,,, | Performed by: FAMILY MEDICINE

## 2020-10-30 PROCEDURE — 36415 COLL VENOUS BLD VENIPUNCTURE: CPT | Mod: PO

## 2020-10-30 PROCEDURE — 80053 COMPREHEN METABOLIC PANEL: CPT

## 2020-10-30 PROCEDURE — 99396 PREV VISIT EST AGE 40-64: CPT | Mod: 25,S$GLB,, | Performed by: FAMILY MEDICINE

## 2020-10-30 PROCEDURE — 84443 ASSAY THYROID STIM HORMONE: CPT

## 2020-10-30 PROCEDURE — 80061 LIPID PANEL: CPT

## 2020-10-30 PROCEDURE — 99999 PR PBB SHADOW E&M-EST. PATIENT-LVL IV: CPT | Mod: PBBFAC,,, | Performed by: FAMILY MEDICINE

## 2020-10-30 PROCEDURE — 90732 PPSV23 VACC 2 YRS+ SUBQ/IM: CPT | Mod: S$GLB,,, | Performed by: FAMILY MEDICINE

## 2020-10-30 PROCEDURE — 99999 PR PBB SHADOW E&M-EST. PATIENT-LVL IV: ICD-10-PCS | Mod: PBBFAC,,, | Performed by: FAMILY MEDICINE

## 2020-10-30 PROCEDURE — 99396 PR PREVENTIVE VISIT,EST,40-64: ICD-10-PCS | Mod: 25,S$GLB,, | Performed by: FAMILY MEDICINE

## 2020-10-30 PROCEDURE — 85025 COMPLETE CBC W/AUTO DIFF WBC: CPT

## 2020-10-30 RX ORDER — CEPHALEXIN 500 MG/1
500 CAPSULE ORAL EVERY 8 HOURS
COMMUNITY
End: 2021-07-09

## 2020-10-30 RX ORDER — MUPIROCIN 20 MG/G
OINTMENT TOPICAL 3 TIMES DAILY
COMMUNITY
End: 2021-07-09

## 2020-10-30 RX ORDER — HYDROCHLOROTHIAZIDE 12.5 MG/1
12.5 CAPSULE ORAL DAILY
Qty: 90 CAPSULE | Refills: 3 | Status: SHIPPED | OUTPATIENT
Start: 2020-10-30 | End: 2021-03-11

## 2020-10-30 NOTE — PROGRESS NOTES
CHIEF COMPLAINT:  This is a 55-year-old female here for preventive health exam.     SUBJECTIVE:  Patient reports recent injury to right thumb requiring sutures 2.5 weeks ago. She is taking Sinemet and Azilect for Parkinson's disease per her neurologist.  She has a tremor and was told that she had mild disease. She takes Mirapex for restless leg syndrome.  Her hypertension is controlled on HCTZ.  She has dyslipidemia and prediabetes.  She is not exercising regularly.  She had sleep study which indicated that she had narcolepsy which she rejects.     Eye exam October 2019. Mammogram November 2019. Colonoscopy June 2018, due again in June 2023. Tdap September 2011. Flu vaccine September 2020.     ROS:  GENERAL: Patient denies fever, chills, night sweats. Patient denies weight gain or loss. Patient denies anorexia, fatigue, weakness or swollen glands.  SKIN: Patient denies rash or hair loss.  HEENT: Patient denies sore throat, ear pain, hearing loss, nasal congestion, or runny nose. Patient denies visual disturbance, eye irritation or discharge.  LUNGS: Patient denies cough, wheeze or hemoptysis.  CARDIOVASCULAR: Patient denies chest pain, shortness of breath, palpitations, syncope or lower extremity edema.  GI: Patient denies abdominal pain, nausea, vomiting, diarrhea, constipation, blood in stool or melena.  GENITOURINARY: Patient denies pelvic pain, vaginal discharge, itch or odor. Patient denies irregular vaginal bleeding. Patient denies dysuria, frequency, hematuria, nocturia, urgency or incontinence.  BREASTS: Patient denies breast pain, mass or nipple discharge.  MUSCULOSKELETAL: Patient denies joint pain, swelling, redness or warmth.  NEUROLOGIC: Patient denies headache, vertigo, paresthesias, weakness in limb, dysarthria, dysphagia or abnormality of gait.  PSYCHIATRIC: Patient denies anxiety, depression, or memory loss.     OBJECTIVE:   GENERAL: Well-developed well-nourished slightly overweight black female  alert and oriented x3, in no acute distress. Memory, judgment and cognition without deficit.   SKIN: Clear without rash. Normal color and tone.   HEENT: Eyes: Clear conjunctivae. No scleral icterus. Pupils equal reactive to light and accommodation. Ears: Clear TMs. Clear canals. Nose: Without congestion. Pharynx: Without injection or exudates.   NECK: Supple, normal range of motion. No masses, lymphadenopathy or enlarged thyroid. No JVD. Carotids 2+ and equal. No bruits.   LUNGS: Clear to auscultation. Normal respiratory effort.   CARDIOVASCULAR: Regular rhythm, normal S1, S2 without murmur, gallop or rub.   BACK: No CVA or spinal tenderness.   BREASTS: No masses, tenderness or nipple discharge.   ABDOMEN: Normal appearance. Active bowel sounds. Soft, nontender without mass or organomegaly. No rebound or guarding. Well-healed incisional scars.   EXTREMITIES: Without cyanosis, clubbing or edema. Distal pulses 2+ and equal. Normal range of motion in all extremities. No joint effusion, erythema or warmth.   NEUROLOGIC: Cranial nerves II through XII without deficit. Motor strength equal bilaterally. Sensation normal to touch. Deep tendon reflexes 2+ and equal. Gait without abnormality. No tremor. Negative cerebellar signs.   PELVIC: External: Without lesions or inflammation. Vaginal: Cuff intact. Bimanual: Normal size and shape. Adnexa: Non-tender, without masses. Rectovaginal: Confirms, heme-negative stool x2.    ASSESSMENT:  1. Preventative health care    2. Essential hypertension    3. Hyperlipidemia, unspecified hyperlipidemia type    4. Prediabetes    5. Parkinson's disease    6. Need for vaccination    7. Encounter for screening mammogram for malignant neoplasm of breast        PLAN:  1.  Weight reduction. Exercise regularly.  2.  Age-appropriate counseling.  3.  Fasting lab.  4.  Mammogram.  5.  Refill HCTZ.  6.  Pneumovax.  7.  Shingrix vaccine.  Return to clinic in 2-6 months for booster.  8.  Follow-up  annually.    This note is generated with speech recognition software and is subject to transcription error and sound alike phrases that may be missed by proofreading.

## 2020-10-31 LAB
ESTIMATED AVG GLUCOSE: 128 MG/DL (ref 68–131)
HBA1C MFR BLD HPLC: 6.1 % (ref 4–5.6)

## 2020-11-06 ENCOUNTER — CLINICAL SUPPORT (OUTPATIENT)
Dept: FAMILY MEDICINE | Facility: CLINIC | Age: 55
End: 2020-11-06
Payer: COMMERCIAL

## 2020-11-06 PROCEDURE — 99999 PR PBB SHADOW E&M-EST. PATIENT-LVL II: ICD-10-PCS | Mod: PBBFAC,,,

## 2020-11-06 PROCEDURE — 90750 ZOSTER RECOMBINANT VACCINE: ICD-10-PCS | Mod: S$GLB,,, | Performed by: FAMILY MEDICINE

## 2020-11-06 PROCEDURE — 99999 PR PBB SHADOW E&M-EST. PATIENT-LVL II: CPT | Mod: PBBFAC,,,

## 2020-11-06 PROCEDURE — 90471 ZOSTER RECOMBINANT VACCINE: ICD-10-PCS | Mod: S$GLB,,, | Performed by: FAMILY MEDICINE

## 2020-11-06 PROCEDURE — 90471 IMMUNIZATION ADMIN: CPT | Mod: S$GLB,,, | Performed by: FAMILY MEDICINE

## 2020-11-06 PROCEDURE — 90750 HZV VACC RECOMBINANT IM: CPT | Mod: S$GLB,,, | Performed by: FAMILY MEDICINE

## 2020-12-02 ENCOUNTER — HOSPITAL ENCOUNTER (OUTPATIENT)
Dept: RADIOLOGY | Facility: HOSPITAL | Age: 55
Discharge: HOME | End: 2020-12-02
Attending: FAMILY MEDICINE
Payer: COMMERCIAL

## 2020-12-02 VITALS — HEIGHT: 60 IN | WEIGHT: 155.63 LBS | BODY MASS INDEX: 30.55 KG/M2

## 2020-12-02 DIAGNOSIS — Z12.31 ENCOUNTER FOR SCREENING MAMMOGRAM FOR MALIGNANT NEOPLASM OF BREAST: ICD-10-CM

## 2020-12-02 PROCEDURE — 77067 SCR MAMMO BI INCL CAD: CPT | Mod: 26,,, | Performed by: RADIOLOGY

## 2020-12-02 PROCEDURE — 77063 MAMMO DIGITAL SCREENING BILAT WITH TOMO: ICD-10-PCS | Mod: 26,,, | Performed by: RADIOLOGY

## 2020-12-02 PROCEDURE — 77067 SCR MAMMO BI INCL CAD: CPT | Mod: TC

## 2020-12-02 PROCEDURE — 77063 BREAST TOMOSYNTHESIS BI: CPT | Mod: 26,,, | Performed by: RADIOLOGY

## 2020-12-02 PROCEDURE — 77067 MAMMO DIGITAL SCREENING BILAT WITH TOMO: ICD-10-PCS | Mod: 26,,, | Performed by: RADIOLOGY

## 2020-12-22 ENCOUNTER — PATIENT MESSAGE (OUTPATIENT)
Dept: FAMILY MEDICINE | Facility: CLINIC | Age: 55
End: 2020-12-22

## 2020-12-22 ENCOUNTER — TELEPHONE (OUTPATIENT)
Dept: FAMILY MEDICINE | Facility: CLINIC | Age: 55
End: 2020-12-22

## 2020-12-22 NOTE — TELEPHONE ENCOUNTER
Called pt to inform her we do have the fmla paperwork ready to be filled out and that Dr. Patterson is out for the Holidays. Pt understood     Last ov- 10/30/20      ----- Message from Delfina Londono sent at 12/22/2020  9:48 AM CST -----  Please call pt @ [phone] regarding papers for FMLA, pt need to know if fax was received.

## 2020-12-23 ENCOUNTER — OFFICE VISIT (OUTPATIENT)
Dept: INTERNAL MEDICINE | Facility: CLINIC | Age: 55
End: 2020-12-23
Payer: COMMERCIAL

## 2020-12-23 VITALS
BODY MASS INDEX: 30.86 KG/M2 | DIASTOLIC BLOOD PRESSURE: 82 MMHG | SYSTOLIC BLOOD PRESSURE: 132 MMHG | TEMPERATURE: 96 F | OXYGEN SATURATION: 97 % | HEART RATE: 78 BPM | HEIGHT: 60 IN | WEIGHT: 157.19 LBS

## 2020-12-23 DIAGNOSIS — I10 ESSENTIAL HYPERTENSION: ICD-10-CM

## 2020-12-23 DIAGNOSIS — R10.9 FLANK PAIN: Primary | ICD-10-CM

## 2020-12-23 PROCEDURE — 99999 PR PBB SHADOW E&M-EST. PATIENT-LVL IV: ICD-10-PCS | Mod: PBBFAC,,, | Performed by: PHYSICIAN ASSISTANT

## 2020-12-23 PROCEDURE — 99214 OFFICE O/P EST MOD 30 MIN: CPT | Mod: S$GLB,,, | Performed by: PHYSICIAN ASSISTANT

## 2020-12-23 PROCEDURE — 99999 PR PBB SHADOW E&M-EST. PATIENT-LVL IV: CPT | Mod: PBBFAC,,, | Performed by: PHYSICIAN ASSISTANT

## 2020-12-23 PROCEDURE — 99214 PR OFFICE/OUTPT VISIT, EST, LEVL IV, 30-39 MIN: ICD-10-PCS | Mod: S$GLB,,, | Performed by: PHYSICIAN ASSISTANT

## 2020-12-23 NOTE — PROGRESS NOTES
Subjective:      Patient ID: Rosalva Stauffer is a 55 y.o. female.    Chief Complaint: Flank Pain    Flank Pain  This is a new problem. The current episode started in the past 7 days. The problem has been waxing and waning since onset. The quality of the pain is described as aching. The pain is mild. The symptoms are aggravated by sitting. Pertinent negatives include no abdominal pain, bladder incontinence, bowel incontinence, chest pain, dysuria, fever, headaches, leg pain, numbness, paresis, paresthesias, pelvic pain, perianal numbness, tingling, weakness or weight loss. She has tried heat for the symptoms. The treatment provided significant relief.   Went to urgent care yesterday and she had blood in the urine but no infection. Pt reports significant improvement today since she put heat on it yesterday. Scheduled for injection into her back next week for her chronic low back pain.   Patient Active Problem List   Diagnosis    Essential hypertension    Hyperlipidemia    Seasonal allergies    Parkinson's disease    Prediabetes       Current Outpatient Medications:     azelastine (ASTELIN) 137 mcg (0.1 %) nasal spray, 1 spray by Nasal route 2 (two) times daily., Disp: , Rfl:     carbidopa-levodopa  mg (SINEMET)  mg per tablet, , Disp: , Rfl:     cephALEXin (KEFLEX) 500 MG capsule, Take 500 mg by mouth every 8 (eight) hours., Disp: , Rfl:     cyanocobalamin (VITAMIN B-12) 1000 MCG tablet, Take 100 mcg by mouth once daily., Disp: , Rfl:     hydroCHLOROthiazide (MICROZIDE) 12.5 mg capsule, Take 1 capsule (12.5 mg total) by mouth once daily., Disp: 90 capsule, Rfl: 3    methocarbamoL (ROBAXIN) 750 MG Tab, TK 1 T PO BID PRF SPASM, Disp: , Rfl:     multivitamin capsule, Take 1 capsule by mouth once daily., Disp: , Rfl:     mupirocin (BACTROBAN) 2 % ointment, Apply topically 3 (three) times daily., Disp: , Rfl:     pramipexole (MIRAPEX) 0.25 MG tablet, .75mg, Disp: , Rfl:     rasagiline  (AZILECT) 1 mg Tab, Take 1 mg by mouth once daily., Disp: , Rfl:       Review of Systems   Constitutional: Negative for activity change, appetite change, chills, diaphoresis, fatigue, fever, unexpected weight change and weight loss.   HENT: Negative.  Negative for congestion, hearing loss, postnasal drip, rhinorrhea, sore throat, trouble swallowing and voice change.    Eyes: Negative.  Negative for visual disturbance.   Respiratory: Negative.  Negative for cough, choking, chest tightness and shortness of breath.    Cardiovascular: Negative for chest pain, palpitations and leg swelling.   Gastrointestinal: Negative for abdominal distention, abdominal pain, blood in stool, bowel incontinence, constipation, diarrhea, nausea and vomiting.   Endocrine: Negative for cold intolerance, heat intolerance, polydipsia and polyuria.   Genitourinary: Positive for flank pain and urgency. Negative for bladder incontinence, difficulty urinating, dysuria, frequency and pelvic pain.   Musculoskeletal: Positive for arthralgias and back pain. Negative for gait problem, joint swelling, myalgias, neck pain and neck stiffness.   Skin: Negative for color change, pallor, rash and wound.   Neurological: Negative for dizziness, tingling, tremors, weakness, light-headedness, numbness, headaches and paresthesias.   Hematological: Negative for adenopathy.   Psychiatric/Behavioral: Negative for behavioral problems, confusion, self-injury, sleep disturbance and suicidal ideas. The patient is not nervous/anxious.      Objective:   /82 (BP Location: Right arm, Patient Position: Sitting, BP Method: Large (Manual))   Pulse 78   Temp 96.4 °F (35.8 °C) (Tympanic)   Ht 5' (1.524 m)   Wt 71.3 kg (157 lb 3 oz)   LMP 05/28/2013   SpO2 97%   BMI 30.70 kg/m²     Physical Exam  Vitals signs reviewed.   Constitutional:       Appearance: She is well-developed.   HENT:      Head: Normocephalic and atraumatic.      Right Ear: External ear normal.       Left Ear: External ear normal.      Nose: Nose normal.   Eyes:      Conjunctiva/sclera: Conjunctivae normal.      Pupils: Pupils are equal, round, and reactive to light.   Neck:      Musculoskeletal: Normal range of motion and neck supple.   Cardiovascular:      Rate and Rhythm: Normal rate and regular rhythm.      Heart sounds: Normal heart sounds. No murmur. No friction rub. No gallop.    Pulmonary:      Effort: Pulmonary effort is normal. No respiratory distress.      Breath sounds: Normal breath sounds. No wheezing or rales.   Chest:      Chest wall: No tenderness.   Abdominal:      General: Abdomen is flat. Bowel sounds are normal. There is no distension.      Palpations: Abdomen is soft.      Tenderness: There is no abdominal tenderness. There is no right CVA tenderness, left CVA tenderness, guarding or rebound. Negative signs include Waldrop's sign, Rovsing's sign, McBurney's sign and psoas sign.   Musculoskeletal: Normal range of motion.      Right hip: She exhibits normal range of motion, normal strength, no tenderness, no bony tenderness, no swelling, no crepitus, no deformity and no laceration.      Thoracic back: She exhibits normal range of motion, no tenderness and no bony tenderness.      Lumbar back: She exhibits normal range of motion, no tenderness, no bony tenderness and normal pulse.        Legs:    Lymphadenopathy:      Cervical: No cervical adenopathy.   Skin:     General: Skin is warm and dry.   Neurological:      Mental Status: She is alert and oriented to person, place, and time.   Psychiatric:         Behavior: Behavior normal.         Thought Content: Thought content normal.         Judgment: Judgment normal.         Assessment:     1. Flank pain    2. Essential hypertension      Plan:   Flank pain  -     Urine culture; Future  -     Urinalysis; Future; Expected date: 12/23/2020  -no acute findings on exam today.   -cont with conservative management.     Essential hypertension  -Stable and  controlled. Continue current treatment plan as previously prescribed with your PCP.       Follow up if symptoms worsen or fail to improve.

## 2020-12-24 ENCOUNTER — PATIENT MESSAGE (OUTPATIENT)
Dept: INTERNAL MEDICINE | Facility: CLINIC | Age: 55
End: 2020-12-24

## 2020-12-31 ENCOUNTER — LAB VISIT (OUTPATIENT)
Dept: LAB | Facility: HOSPITAL | Age: 55
End: 2020-12-31
Attending: PHYSICIAN ASSISTANT
Payer: COMMERCIAL

## 2020-12-31 DIAGNOSIS — R10.9 FLANK PAIN: ICD-10-CM

## 2020-12-31 LAB
BILIRUB UR QL STRIP: NEGATIVE
CLARITY UR REFRACT.AUTO: CLEAR
COLOR UR AUTO: NORMAL
GLUCOSE UR QL STRIP: NEGATIVE
HGB UR QL STRIP: NEGATIVE
KETONES UR QL STRIP: NEGATIVE
LEUKOCYTE ESTERASE UR QL STRIP: NEGATIVE
NITRITE UR QL STRIP: NEGATIVE
PH UR STRIP: 5 [PH] (ref 5–8)
PROT UR QL STRIP: NEGATIVE
SP GR UR STRIP: 1.01 (ref 1–1.03)
URN SPEC COLLECT METH UR: NORMAL

## 2020-12-31 PROCEDURE — 87088 URINE BACTERIA CULTURE: CPT

## 2020-12-31 PROCEDURE — 87077 CULTURE AEROBIC IDENTIFY: CPT | Mod: 59

## 2020-12-31 PROCEDURE — 87186 SC STD MICRODIL/AGAR DIL: CPT

## 2020-12-31 PROCEDURE — 87086 URINE CULTURE/COLONY COUNT: CPT

## 2020-12-31 PROCEDURE — 81003 URINALYSIS AUTO W/O SCOPE: CPT

## 2021-01-04 LAB
BACTERIA UR CULT: ABNORMAL
BACTERIA UR CULT: ABNORMAL

## 2021-01-05 ENCOUNTER — PATIENT MESSAGE (OUTPATIENT)
Dept: FAMILY MEDICINE | Facility: CLINIC | Age: 56
End: 2021-01-05

## 2021-01-05 DIAGNOSIS — Z23 NEED FOR VACCINATION: Primary | ICD-10-CM

## 2021-01-05 DIAGNOSIS — N30.00 ACUTE CYSTITIS WITHOUT HEMATURIA: Primary | ICD-10-CM

## 2021-01-05 RX ORDER — NITROFURANTOIN 25; 75 MG/1; MG/1
100 CAPSULE ORAL 2 TIMES DAILY
Qty: 14 CAPSULE | Refills: 0 | Status: SHIPPED | OUTPATIENT
Start: 2021-01-05 | End: 2021-01-12

## 2021-01-11 ENCOUNTER — CLINICAL SUPPORT (OUTPATIENT)
Dept: FAMILY MEDICINE | Facility: CLINIC | Age: 56
End: 2021-01-11
Payer: COMMERCIAL

## 2021-01-11 PROCEDURE — 99999 PR PBB SHADOW E&M-EST. PATIENT-LVL II: CPT | Mod: PBBFAC,,,

## 2021-01-11 PROCEDURE — 90750 HZV VACC RECOMBINANT IM: CPT | Mod: S$GLB,,, | Performed by: FAMILY MEDICINE

## 2021-01-11 PROCEDURE — 90471 IMMUNIZATION ADMIN: CPT | Mod: S$GLB,,, | Performed by: FAMILY MEDICINE

## 2021-01-11 PROCEDURE — 90471 ZOSTER RECOMBINANT VACCINE: ICD-10-PCS | Mod: S$GLB,,, | Performed by: FAMILY MEDICINE

## 2021-01-11 PROCEDURE — 90750 ZOSTER RECOMBINANT VACCINE: ICD-10-PCS | Mod: S$GLB,,, | Performed by: FAMILY MEDICINE

## 2021-01-11 PROCEDURE — 99999 PR PBB SHADOW E&M-EST. PATIENT-LVL II: ICD-10-PCS | Mod: PBBFAC,,,

## 2021-01-31 ENCOUNTER — PATIENT MESSAGE (OUTPATIENT)
Dept: FAMILY MEDICINE | Facility: CLINIC | Age: 56
End: 2021-01-31

## 2021-03-08 ENCOUNTER — PATIENT MESSAGE (OUTPATIENT)
Dept: FAMILY MEDICINE | Facility: CLINIC | Age: 56
End: 2021-03-08

## 2021-03-08 DIAGNOSIS — R31.0 GROSS HEMATURIA: ICD-10-CM

## 2021-03-08 DIAGNOSIS — N39.0 RECURRENT UTI: Primary | ICD-10-CM

## 2021-03-11 RX ORDER — HYDROCHLOROTHIAZIDE 12.5 MG/1
CAPSULE ORAL
Qty: 90 CAPSULE | Refills: 2 | Status: SHIPPED | OUTPATIENT
Start: 2021-03-11 | End: 2021-11-22 | Stop reason: SDUPTHER

## 2021-07-09 ENCOUNTER — OFFICE VISIT (OUTPATIENT)
Dept: FAMILY MEDICINE | Facility: CLINIC | Age: 56
End: 2021-07-09
Payer: COMMERCIAL

## 2021-07-09 ENCOUNTER — LAB VISIT (OUTPATIENT)
Dept: LAB | Facility: HOSPITAL | Age: 56
End: 2021-07-09
Payer: COMMERCIAL

## 2021-07-09 VITALS
HEIGHT: 60 IN | TEMPERATURE: 98 F | DIASTOLIC BLOOD PRESSURE: 82 MMHG | WEIGHT: 157.06 LBS | OXYGEN SATURATION: 99 % | BODY MASS INDEX: 30.83 KG/M2 | HEART RATE: 89 BPM | SYSTOLIC BLOOD PRESSURE: 120 MMHG

## 2021-07-09 DIAGNOSIS — Z00.00 PREVENTATIVE HEALTH CARE: Primary | ICD-10-CM

## 2021-07-09 DIAGNOSIS — G20.A1 PARKINSON'S DISEASE: ICD-10-CM

## 2021-07-09 DIAGNOSIS — R73.03 PREDIABETES: ICD-10-CM

## 2021-07-09 DIAGNOSIS — E78.5 HYPERLIPIDEMIA, UNSPECIFIED HYPERLIPIDEMIA TYPE: ICD-10-CM

## 2021-07-09 DIAGNOSIS — I10 ESSENTIAL HYPERTENSION: ICD-10-CM

## 2021-07-09 DIAGNOSIS — Z00.00 PREVENTATIVE HEALTH CARE: ICD-10-CM

## 2021-07-09 PROCEDURE — 80053 COMPREHEN METABOLIC PANEL: CPT | Performed by: FAMILY MEDICINE

## 2021-07-09 PROCEDURE — 99999 PR PBB SHADOW E&M-EST. PATIENT-LVL III: ICD-10-PCS | Mod: PBBFAC,,, | Performed by: FAMILY MEDICINE

## 2021-07-09 PROCEDURE — 80061 LIPID PANEL: CPT | Performed by: FAMILY MEDICINE

## 2021-07-09 PROCEDURE — 99396 PREV VISIT EST AGE 40-64: CPT | Mod: S$PBB,,, | Performed by: FAMILY MEDICINE

## 2021-07-09 PROCEDURE — 83036 HEMOGLOBIN GLYCOSYLATED A1C: CPT | Performed by: FAMILY MEDICINE

## 2021-07-09 PROCEDURE — 99396 PR PREVENTIVE VISIT,EST,40-64: ICD-10-PCS | Mod: S$PBB,,, | Performed by: FAMILY MEDICINE

## 2021-07-09 PROCEDURE — 84443 ASSAY THYROID STIM HORMONE: CPT | Performed by: FAMILY MEDICINE

## 2021-07-09 PROCEDURE — 85025 COMPLETE CBC W/AUTO DIFF WBC: CPT | Performed by: FAMILY MEDICINE

## 2021-07-09 PROCEDURE — 99999 PR PBB SHADOW E&M-EST. PATIENT-LVL III: CPT | Mod: PBBFAC,,, | Performed by: FAMILY MEDICINE

## 2021-07-09 PROCEDURE — 36415 COLL VENOUS BLD VENIPUNCTURE: CPT | Mod: PO | Performed by: FAMILY MEDICINE

## 2021-07-10 LAB
ALBUMIN SERPL BCP-MCNC: 4 G/DL (ref 3.5–5.2)
ALP SERPL-CCNC: 78 U/L (ref 55–135)
ALT SERPL W/O P-5'-P-CCNC: 29 U/L (ref 10–44)
ANION GAP SERPL CALC-SCNC: 12 MMOL/L (ref 8–16)
AST SERPL-CCNC: 45 U/L (ref 10–40)
BASOPHILS # BLD AUTO: 0.02 K/UL (ref 0–0.2)
BASOPHILS NFR BLD: 0.5 % (ref 0–1.9)
BILIRUB SERPL-MCNC: 0.4 MG/DL (ref 0.1–1)
BUN SERPL-MCNC: 14 MG/DL (ref 6–20)
CALCIUM SERPL-MCNC: 10.3 MG/DL (ref 8.7–10.5)
CHLORIDE SERPL-SCNC: 100 MMOL/L (ref 95–110)
CHOLEST SERPL-MCNC: 264 MG/DL (ref 120–199)
CHOLEST/HDLC SERPL: 3 {RATIO} (ref 2–5)
CO2 SERPL-SCNC: 26 MMOL/L (ref 23–29)
CREAT SERPL-MCNC: 1 MG/DL (ref 0.5–1.4)
DIFFERENTIAL METHOD: ABNORMAL
EOSINOPHIL # BLD AUTO: 0.1 K/UL (ref 0–0.5)
EOSINOPHIL NFR BLD: 2.5 % (ref 0–8)
ERYTHROCYTE [DISTWIDTH] IN BLOOD BY AUTOMATED COUNT: 14.9 % (ref 11.5–14.5)
EST. GFR  (AFRICAN AMERICAN): >60 ML/MIN/1.73 M^2
EST. GFR  (NON AFRICAN AMERICAN): >60 ML/MIN/1.73 M^2
ESTIMATED AVG GLUCOSE: 128 MG/DL (ref 68–131)
GLUCOSE SERPL-MCNC: 86 MG/DL (ref 70–110)
HBA1C MFR BLD: 6.1 % (ref 4–5.6)
HCT VFR BLD AUTO: 38.5 % (ref 37–48.5)
HDLC SERPL-MCNC: 89 MG/DL (ref 40–75)
HDLC SERPL: 33.7 % (ref 20–50)
HGB BLD-MCNC: 12.4 G/DL (ref 12–16)
IMM GRANULOCYTES # BLD AUTO: 0 K/UL (ref 0–0.04)
IMM GRANULOCYTES NFR BLD AUTO: 0 % (ref 0–0.5)
LDLC SERPL CALC-MCNC: 160.2 MG/DL (ref 63–159)
LYMPHOCYTES # BLD AUTO: 1.7 K/UL (ref 1–4.8)
LYMPHOCYTES NFR BLD: 39.6 % (ref 18–48)
MCH RBC QN AUTO: 29.5 PG (ref 27–31)
MCHC RBC AUTO-ENTMCNC: 32.2 G/DL (ref 32–36)
MCV RBC AUTO: 91 FL (ref 82–98)
MONOCYTES # BLD AUTO: 0.3 K/UL (ref 0.3–1)
MONOCYTES NFR BLD: 7.3 % (ref 4–15)
NEUTROPHILS # BLD AUTO: 2.2 K/UL (ref 1.8–7.7)
NEUTROPHILS NFR BLD: 50.1 % (ref 38–73)
NONHDLC SERPL-MCNC: 175 MG/DL
NRBC BLD-RTO: 0 /100 WBC
PLATELET # BLD AUTO: 260 K/UL (ref 150–450)
PMV BLD AUTO: 11.7 FL (ref 9.2–12.9)
POTASSIUM SERPL-SCNC: 4.3 MMOL/L (ref 3.5–5.1)
PROT SERPL-MCNC: 7.9 G/DL (ref 6–8.4)
RBC # BLD AUTO: 4.21 M/UL (ref 4–5.4)
SODIUM SERPL-SCNC: 138 MMOL/L (ref 136–145)
TRIGL SERPL-MCNC: 74 MG/DL (ref 30–150)
TSH SERPL DL<=0.005 MIU/L-ACNC: 0.83 UIU/ML (ref 0.4–4)
WBC # BLD AUTO: 4.37 K/UL (ref 3.9–12.7)

## 2021-08-20 ENCOUNTER — TELEPHONE (OUTPATIENT)
Dept: FAMILY MEDICINE | Facility: CLINIC | Age: 56
End: 2021-08-20

## 2021-09-10 ENCOUNTER — PATIENT MESSAGE (OUTPATIENT)
Dept: FAMILY MEDICINE | Facility: CLINIC | Age: 56
End: 2021-09-10

## 2021-09-10 ENCOUNTER — TELEPHONE (OUTPATIENT)
Dept: FAMILY MEDICINE | Facility: CLINIC | Age: 56
End: 2021-09-10

## 2021-09-13 ENCOUNTER — TELEPHONE (OUTPATIENT)
Dept: FAMILY MEDICINE | Facility: CLINIC | Age: 56
End: 2021-09-13

## 2021-09-14 ENCOUNTER — OFFICE VISIT (OUTPATIENT)
Dept: FAMILY MEDICINE | Facility: CLINIC | Age: 56
End: 2021-09-14
Payer: COMMERCIAL

## 2021-09-14 VITALS
HEIGHT: 60 IN | WEIGHT: 153.56 LBS | TEMPERATURE: 98 F | OXYGEN SATURATION: 98 % | SYSTOLIC BLOOD PRESSURE: 116 MMHG | BODY MASS INDEX: 30.15 KG/M2 | DIASTOLIC BLOOD PRESSURE: 82 MMHG | HEART RATE: 90 BPM

## 2021-09-14 DIAGNOSIS — R20.0 NUMBNESS OF TOES: Primary | ICD-10-CM

## 2021-09-14 PROCEDURE — 99999 PR PBB SHADOW E&M-EST. PATIENT-LVL IV: ICD-10-PCS | Mod: PBBFAC,,, | Performed by: FAMILY MEDICINE

## 2021-09-14 PROCEDURE — 99213 PR OFFICE/OUTPT VISIT, EST, LEVL III, 20-29 MIN: ICD-10-PCS | Mod: S$GLB,,, | Performed by: FAMILY MEDICINE

## 2021-09-14 PROCEDURE — 99999 PR PBB SHADOW E&M-EST. PATIENT-LVL IV: CPT | Mod: PBBFAC,,, | Performed by: FAMILY MEDICINE

## 2021-09-14 PROCEDURE — 99213 OFFICE O/P EST LOW 20 MIN: CPT | Mod: S$GLB,,, | Performed by: FAMILY MEDICINE

## 2021-09-14 RX ORDER — PRAMIPEXOLE DIHYDROCHLORIDE 1 MG/1
1 TABLET ORAL NIGHTLY
COMMUNITY
Start: 2021-07-13

## 2021-09-16 ENCOUNTER — PATIENT OUTREACH (OUTPATIENT)
Dept: ADMINISTRATIVE | Facility: OTHER | Age: 56
End: 2021-09-16

## 2021-09-21 ENCOUNTER — OFFICE VISIT (OUTPATIENT)
Dept: PODIATRY | Facility: CLINIC | Age: 56
End: 2021-09-21
Payer: COMMERCIAL

## 2021-09-21 VITALS — BODY MASS INDEX: 30.17 KG/M2 | HEIGHT: 60 IN | WEIGHT: 153.69 LBS

## 2021-09-21 DIAGNOSIS — M79.2 NEURITIS: Primary | ICD-10-CM

## 2021-09-21 DIAGNOSIS — R20.0 NUMBNESS OF TOES: ICD-10-CM

## 2021-09-21 PROCEDURE — 99999 PR PBB SHADOW E&M-EST. PATIENT-LVL III: CPT | Mod: PBBFAC,,, | Performed by: PODIATRIST

## 2021-09-21 PROCEDURE — 99203 OFFICE O/P NEW LOW 30 MIN: CPT | Mod: S$GLB,,, | Performed by: PODIATRIST

## 2021-09-21 PROCEDURE — 99999 PR PBB SHADOW E&M-EST. PATIENT-LVL III: ICD-10-PCS | Mod: PBBFAC,,, | Performed by: PODIATRIST

## 2021-09-21 PROCEDURE — 99203 PR OFFICE/OUTPT VISIT, NEW, LEVL III, 30-44 MIN: ICD-10-PCS | Mod: S$GLB,,, | Performed by: PODIATRIST

## 2021-10-29 ENCOUNTER — PATIENT MESSAGE (OUTPATIENT)
Dept: PODIATRY | Facility: CLINIC | Age: 56
End: 2021-10-29
Payer: COMMERCIAL

## 2021-10-29 DIAGNOSIS — M79.672 LEFT FOOT PAIN: Primary | ICD-10-CM

## 2021-11-01 ENCOUNTER — PATIENT MESSAGE (OUTPATIENT)
Dept: FAMILY MEDICINE | Facility: CLINIC | Age: 56
End: 2021-11-01
Payer: COMMERCIAL

## 2021-11-08 ENCOUNTER — TELEPHONE (OUTPATIENT)
Dept: FAMILY MEDICINE | Facility: CLINIC | Age: 56
End: 2021-11-08
Payer: COMMERCIAL

## 2021-11-10 ENCOUNTER — HOSPITAL ENCOUNTER (OUTPATIENT)
Dept: RADIOLOGY | Facility: HOSPITAL | Age: 56
Discharge: HOME | End: 2021-11-10
Attending: REGISTERED NURSE
Payer: COMMERCIAL

## 2021-11-10 ENCOUNTER — OFFICE VISIT (OUTPATIENT)
Dept: FAMILY MEDICINE | Facility: CLINIC | Age: 56
End: 2021-11-10
Payer: COMMERCIAL

## 2021-11-10 VITALS
HEIGHT: 60 IN | SYSTOLIC BLOOD PRESSURE: 110 MMHG | WEIGHT: 152.69 LBS | TEMPERATURE: 98 F | DIASTOLIC BLOOD PRESSURE: 78 MMHG | OXYGEN SATURATION: 98 % | BODY MASS INDEX: 29.98 KG/M2 | RESPIRATION RATE: 18 BRPM | HEART RATE: 84 BPM

## 2021-11-10 DIAGNOSIS — R07.89 SENSATION OF CHEST PRESSURE: ICD-10-CM

## 2021-11-10 DIAGNOSIS — J30.9 ALLERGIC RHINITIS, UNSPECIFIED SEASONALITY, UNSPECIFIED TRIGGER: Primary | ICD-10-CM

## 2021-11-10 PROCEDURE — 99999 PR PBB SHADOW E&M-EST. PATIENT-LVL IV: CPT | Mod: PBBFAC,,, | Performed by: REGISTERED NURSE

## 2021-11-10 PROCEDURE — 99999 PR PBB SHADOW E&M-EST. PATIENT-LVL IV: ICD-10-PCS | Mod: PBBFAC,,, | Performed by: REGISTERED NURSE

## 2021-11-10 PROCEDURE — 96372 THER/PROPH/DIAG INJ SC/IM: CPT | Mod: S$GLB,,, | Performed by: REGISTERED NURSE

## 2021-11-10 PROCEDURE — 71046 XR CHEST PA AND LATERAL: ICD-10-PCS | Mod: 26,,, | Performed by: RADIOLOGY

## 2021-11-10 PROCEDURE — 96372 PR INJECTION,THERAP/PROPH/DIAG2ST, IM OR SUBCUT: ICD-10-PCS | Mod: S$GLB,,, | Performed by: REGISTERED NURSE

## 2021-11-10 PROCEDURE — 99214 OFFICE O/P EST MOD 30 MIN: CPT | Mod: 25,S$GLB,, | Performed by: REGISTERED NURSE

## 2021-11-10 PROCEDURE — 71046 X-RAY EXAM CHEST 2 VIEWS: CPT | Mod: TC,FY,PO

## 2021-11-10 PROCEDURE — 71046 X-RAY EXAM CHEST 2 VIEWS: CPT | Mod: 26,,, | Performed by: RADIOLOGY

## 2021-11-10 PROCEDURE — 99214 PR OFFICE/OUTPT VISIT, EST, LEVL IV, 30-39 MIN: ICD-10-PCS | Mod: 25,S$GLB,, | Performed by: REGISTERED NURSE

## 2021-11-10 RX ORDER — MONTELUKAST SODIUM 10 MG/1
10 TABLET ORAL NIGHTLY
Qty: 90 TABLET | Refills: 0 | Status: SHIPPED | OUTPATIENT
Start: 2021-11-10 | End: 2022-02-24

## 2021-11-10 RX ORDER — IBUPROFEN 600 MG/1
600 TABLET ORAL 3 TIMES DAILY
COMMUNITY
End: 2022-08-26

## 2021-11-10 RX ORDER — METHYLPREDNISOLONE ACETATE 80 MG/ML
80 INJECTION, SUSPENSION INTRA-ARTICULAR; INTRALESIONAL; INTRAMUSCULAR; SOFT TISSUE ONCE
Status: COMPLETED | OUTPATIENT
Start: 2021-11-10 | End: 2021-11-10

## 2021-11-10 RX ADMIN — METHYLPREDNISOLONE ACETATE 80 MG: 80 INJECTION, SUSPENSION INTRA-ARTICULAR; INTRALESIONAL; INTRAMUSCULAR; SOFT TISSUE at 12:11

## 2021-11-22 ENCOUNTER — PATIENT MESSAGE (OUTPATIENT)
Dept: FAMILY MEDICINE | Facility: CLINIC | Age: 56
End: 2021-11-22
Payer: COMMERCIAL

## 2021-11-22 RX ORDER — HYDROCHLOROTHIAZIDE 12.5 MG/1
12.5 CAPSULE ORAL DAILY
Qty: 90 CAPSULE | Refills: 2 | Status: SHIPPED | OUTPATIENT
Start: 2021-11-22 | End: 2022-08-01 | Stop reason: SDUPTHER

## 2021-11-28 ENCOUNTER — PATIENT MESSAGE (OUTPATIENT)
Dept: FAMILY MEDICINE | Facility: CLINIC | Age: 56
End: 2021-11-28
Payer: COMMERCIAL

## 2021-11-29 ENCOUNTER — OFFICE VISIT (OUTPATIENT)
Dept: FAMILY MEDICINE | Facility: CLINIC | Age: 56
End: 2021-11-29
Payer: COMMERCIAL

## 2021-11-29 VITALS
HEIGHT: 60 IN | WEIGHT: 155.88 LBS | BODY MASS INDEX: 30.61 KG/M2 | TEMPERATURE: 97 F | HEART RATE: 62 BPM | RESPIRATION RATE: 14 BRPM | DIASTOLIC BLOOD PRESSURE: 80 MMHG | SYSTOLIC BLOOD PRESSURE: 126 MMHG | OXYGEN SATURATION: 99 %

## 2021-11-29 DIAGNOSIS — Z23 NEED FOR VACCINATION: ICD-10-CM

## 2021-11-29 DIAGNOSIS — L03.019 PARONYCHIA OF INDEX FINGER: Primary | ICD-10-CM

## 2021-11-29 PROCEDURE — 90686 IIV4 VACC NO PRSV 0.5 ML IM: CPT | Mod: S$GLB,,, | Performed by: FAMILY MEDICINE

## 2021-11-29 PROCEDURE — 10060 I&D ABSCESS SIMPLE/SINGLE: CPT | Mod: S$GLB,,, | Performed by: FAMILY MEDICINE

## 2021-11-29 PROCEDURE — 99999 PR PBB SHADOW E&M-EST. PATIENT-LVL IV: CPT | Mod: PBBFAC,,, | Performed by: FAMILY MEDICINE

## 2021-11-29 PROCEDURE — 90471 IMMUNIZATION ADMIN: CPT | Mod: S$GLB,,, | Performed by: FAMILY MEDICINE

## 2021-11-29 PROCEDURE — 90471 FLU VACCINE (QUAD) GREATER THAN OR EQUAL TO 3YO PRESERVATIVE FREE IM: ICD-10-PCS | Mod: S$GLB,,, | Performed by: FAMILY MEDICINE

## 2021-11-29 PROCEDURE — 90686 FLU VACCINE (QUAD) GREATER THAN OR EQUAL TO 3YO PRESERVATIVE FREE IM: ICD-10-PCS | Mod: S$GLB,,, | Performed by: FAMILY MEDICINE

## 2021-11-29 PROCEDURE — 10060 PR DRAIN SKIN ABSCESS SIMPLE: ICD-10-PCS | Mod: S$GLB,,, | Performed by: FAMILY MEDICINE

## 2021-11-29 PROCEDURE — 99213 PR OFFICE/OUTPT VISIT, EST, LEVL III, 20-29 MIN: ICD-10-PCS | Mod: 25,S$GLB,, | Performed by: FAMILY MEDICINE

## 2021-11-29 PROCEDURE — 99999 PR PBB SHADOW E&M-EST. PATIENT-LVL IV: ICD-10-PCS | Mod: PBBFAC,,, | Performed by: FAMILY MEDICINE

## 2021-11-29 PROCEDURE — 99213 OFFICE O/P EST LOW 20 MIN: CPT | Mod: 25,S$GLB,, | Performed by: FAMILY MEDICINE

## 2021-11-29 RX ORDER — SULFAMETHOXAZOLE AND TRIMETHOPRIM 800; 160 MG/1; MG/1
1 TABLET ORAL 2 TIMES DAILY
Qty: 20 TABLET | Refills: 0 | Status: SHIPPED | OUTPATIENT
Start: 2021-11-29 | End: 2021-12-09

## 2021-12-07 ENCOUNTER — TELEPHONE (OUTPATIENT)
Dept: FAMILY MEDICINE | Facility: CLINIC | Age: 56
End: 2021-12-07
Payer: COMMERCIAL

## 2021-12-07 NOTE — TELEPHONE ENCOUNTER
----- Message from Veena Charles sent at 12/7/2021  3:42 PM CST -----  Contact: [phone]  Caller is requesting to schedule their annual screening mammogram appointment. Order is not listed in Epic.  Please enter order and contact patient to schedule.  Would the patient like a call back, or a response through their [facility] portal?:   call back

## 2021-12-09 ENCOUNTER — PATIENT MESSAGE (OUTPATIENT)
Dept: FAMILY MEDICINE | Facility: CLINIC | Age: 56
End: 2021-12-09
Payer: COMMERCIAL

## 2021-12-09 DIAGNOSIS — Z12.31 ENCOUNTER FOR SCREENING MAMMOGRAM FOR MALIGNANT NEOPLASM OF BREAST: Primary | ICD-10-CM

## 2021-12-21 ENCOUNTER — PATIENT MESSAGE (OUTPATIENT)
Dept: FAMILY MEDICINE | Facility: CLINIC | Age: 56
End: 2021-12-21
Payer: COMMERCIAL

## 2022-01-24 ENCOUNTER — HOSPITAL ENCOUNTER (OUTPATIENT)
Dept: RADIOLOGY | Facility: HOSPITAL | Age: 57
Discharge: HOME | End: 2022-01-24
Attending: FAMILY MEDICINE
Payer: COMMERCIAL

## 2022-01-24 DIAGNOSIS — Z12.31 ENCOUNTER FOR SCREENING MAMMOGRAM FOR MALIGNANT NEOPLASM OF BREAST: ICD-10-CM

## 2022-01-24 PROCEDURE — 77067 MAMMO DIGITAL SCREENING BILAT WITH TOMO: ICD-10-PCS | Mod: 26,,, | Performed by: RADIOLOGY

## 2022-01-24 PROCEDURE — 77067 SCR MAMMO BI INCL CAD: CPT | Mod: 26,,, | Performed by: RADIOLOGY

## 2022-01-24 PROCEDURE — 77063 BREAST TOMOSYNTHESIS BI: CPT | Mod: 26,,, | Performed by: RADIOLOGY

## 2022-01-24 PROCEDURE — 77063 BREAST TOMOSYNTHESIS BI: CPT | Mod: TC

## 2022-01-24 PROCEDURE — 77063 MAMMO DIGITAL SCREENING BILAT WITH TOMO: ICD-10-PCS | Mod: 26,,, | Performed by: RADIOLOGY

## 2022-02-21 NOTE — PROGRESS NOTES
Subjective:       Patient ID: Rosalva Stauffer is a 56 y.o. female.      Chief Complaint   Patient presents with    Urinary Frequency       HPI    Rosalva reports urinary frequency over the past few weeks.  Started Mobic per Dr. Medina ~ 2/4/22.  UF started shortly thereafter.  Stopped Mobic on 2/19/2022 and urinary frequency resolved.  Pre-diabetes, A1c overdue.  Cutting back on sweets.  Denies burning w/ urination, pelvic or flank pain, hematuria, fever or chills.      Lab Results   Component Value Date    HGBA1C 6.1 (H) 07/09/2021       Review of Systems   Constitutional: Negative for activity change, appetite change, chills and fever.   Respiratory: Negative.    Cardiovascular: Negative.    Genitourinary: Positive for frequency (stopped after stopping Mobic). Negative for difficulty urinating, dysuria, pelvic pain and urgency.   Musculoskeletal: Positive for arthralgias.   Neurological: Negative.          Review of patient's allergies indicates:   Allergen Reactions    Allegra-d 12 hour [fexofenadine-pseudoephedrine] Palpitations         Patient Active Problem List   Diagnosis    Essential hypertension    Hyperlipidemia    Seasonal allergies    Parkinson's disease    Prediabetes           Current Outpatient Medications:     azelastine (ASTELIN) 137 mcg (0.1 %) nasal spray, 1 spray by Nasal route 2 (two) times daily., Disp: , Rfl:     carbidopa-levodopa  mg (SINEMET)  mg per tablet, , Disp: , Rfl:     cyanocobalamin (VITAMIN B-12) 1000 MCG tablet, Take 100 mcg by mouth once daily., Disp: , Rfl:     hydroCHLOROthiazide (MICROZIDE) 12.5 mg capsule, Take 1 capsule (12.5 mg total) by mouth once daily., Disp: 90 capsule, Rfl: 2    montelukast (SINGULAIR) 10 mg tablet, Take 1 tablet (10 mg total) by mouth every evening., Disp: 90 tablet, Rfl: 0    multivitamin capsule, Take 1 capsule by mouth once daily., Disp: , Rfl:     pramipexole (MIRAPEX) 1 MG tablet, Take 1 mg by mouth nightly. Pt states  she takes 1mg at night, Disp: , Rfl:     rasagiline (AZILECT) 1 mg Tab, Take 1 mg by mouth once daily., Disp: , Rfl:     ibuprofen (ADVIL,MOTRIN) 600 MG tablet, Take 600 mg by mouth 3 (three) times daily., Disp: , Rfl:     methocarbamoL (ROBAXIN) 750 MG Tab, TK 1 T PO BID PRF SPASM, Disp: , Rfl:         Past medical, surgical, family and social histories have been reviewed today.      Objective:     Vitals:    02/24/22 0817   BP: 110/76   Pulse: 79   Temp: 97.5 °F (36.4 °C)   SpO2: 99%   Weight: 69.6 kg (153 lb 7 oz)   Height: 5' (1.524 m)         Physical Exam  Vitals reviewed.   Constitutional:       General: She is not in acute distress.  HENT:      Head: Normocephalic and atraumatic.   Abdominal:      Tenderness: There is no abdominal tenderness. There is no right CVA tenderness, left CVA tenderness or guarding.   Neurological:      Mental Status: She is alert and oriented to person, place, and time.      Motor: No weakness.      Gait: Gait normal.   Psychiatric:         Mood and Affect: Mood normal.         Behavior: Behavior normal.         Thought Content: Thought content normal.         Judgment: Judgment normal.         Results for orders placed or performed in visit on 02/24/22   POCT urine dipstick without microscope   Result Value Ref Range    Color, UA Roseanne     pH, UA 5     WBC, UA Small (+)     Nitrite, UA Neg     Protein, POC Trace     Glucose, UA Neg     Ketones, UA Neg     Urobilinogen, UA Normal     Bilirubin, POC Neg     Blood, UA Neg     Clarity, UA Clear     Spec Grav UA 1.020        Assessment       ICD-10-CM ICD-9-CM    1. Adverse effect of non-steroidal anti-inflammatory drug (NSAID), initial encounter  T39.395A E935.8 UA clear today, Mobic likely cause for her urinary frequency issue.  Discuss with Dr. Medina treatment for her joint pain.   2. Urinary frequency  R35.0 788.41 POCT urine dipstick without microscope      Hemoglobin A1C   3. Prediabetes  R73.03 790.29 Hemoglobin A1C         Follow-up     Lab pending.  Return to clinic as needed.      IVETT Chauhan  [facility]

## 2022-02-24 ENCOUNTER — LAB VISIT (OUTPATIENT)
Dept: LAB | Facility: HOSPITAL | Age: 57
End: 2022-02-24
Attending: REGISTERED NURSE
Payer: COMMERCIAL

## 2022-02-24 ENCOUNTER — OFFICE VISIT (OUTPATIENT)
Dept: FAMILY MEDICINE | Facility: CLINIC | Age: 57
End: 2022-02-24
Payer: COMMERCIAL

## 2022-02-24 VITALS
SYSTOLIC BLOOD PRESSURE: 110 MMHG | DIASTOLIC BLOOD PRESSURE: 76 MMHG | OXYGEN SATURATION: 99 % | WEIGHT: 153.44 LBS | HEIGHT: 60 IN | HEART RATE: 79 BPM | TEMPERATURE: 98 F | BODY MASS INDEX: 30.12 KG/M2

## 2022-02-24 DIAGNOSIS — R73.03 PREDIABETES: ICD-10-CM

## 2022-02-24 DIAGNOSIS — T39.395A ADVERSE EFFECT OF NON-STEROIDAL ANTI-INFLAMMATORY DRUG (NSAID), INITIAL ENCOUNTER: Primary | ICD-10-CM

## 2022-02-24 DIAGNOSIS — R35.0 URINARY FREQUENCY: ICD-10-CM

## 2022-02-24 LAB
BILIRUB SERPL-MCNC: NORMAL MG/DL
BLOOD URINE, POC: NORMAL
CLARITY, POC UA: CLEAR
COLOR, POC UA: NORMAL
ESTIMATED AVG GLUCOSE: 126 MG/DL (ref 68–131)
GLUCOSE UR QL STRIP: NORMAL
HBA1C MFR BLD: 6 % (ref 4–5.6)
KETONES UR QL STRIP: NORMAL
LEUKOCYTE ESTERASE URINE, POC: NORMAL
NITRITE, POC UA: NORMAL
PH, POC UA: 5
PROTEIN, POC: NORMAL
SPECIFIC GRAVITY, POC UA: 1.02
UROBILINOGEN, POC UA: NORMAL

## 2022-02-24 PROCEDURE — 99213 OFFICE O/P EST LOW 20 MIN: CPT | Mod: 25,S$GLB,, | Performed by: REGISTERED NURSE

## 2022-02-24 PROCEDURE — 99999 PR PBB SHADOW E&M-EST. PATIENT-LVL III: CPT | Mod: PBBFAC,,, | Performed by: REGISTERED NURSE

## 2022-02-24 PROCEDURE — 36415 COLL VENOUS BLD VENIPUNCTURE: CPT | Mod: PO | Performed by: REGISTERED NURSE

## 2022-02-24 PROCEDURE — 81002 POCT URINE DIPSTICK WITHOUT MICROSCOPE: ICD-10-PCS | Mod: S$GLB,,, | Performed by: REGISTERED NURSE

## 2022-02-24 PROCEDURE — 99999 PR PBB SHADOW E&M-EST. PATIENT-LVL III: ICD-10-PCS | Mod: PBBFAC,,, | Performed by: REGISTERED NURSE

## 2022-02-24 PROCEDURE — 81002 URINALYSIS NONAUTO W/O SCOPE: CPT | Mod: S$GLB,,, | Performed by: REGISTERED NURSE

## 2022-02-24 PROCEDURE — 83036 HEMOGLOBIN GLYCOSYLATED A1C: CPT | Performed by: REGISTERED NURSE

## 2022-02-24 PROCEDURE — 99213 PR OFFICE/OUTPT VISIT, EST, LEVL III, 20-29 MIN: ICD-10-PCS | Mod: 25,S$GLB,, | Performed by: REGISTERED NURSE

## 2022-05-03 ENCOUNTER — OFFICE VISIT (OUTPATIENT)
Dept: INTERNAL MEDICINE | Facility: CLINIC | Age: 57
End: 2022-05-03
Payer: COMMERCIAL

## 2022-05-03 ENCOUNTER — HOSPITAL ENCOUNTER (OUTPATIENT)
Dept: CARDIOLOGY | Facility: HOSPITAL | Age: 57
Discharge: HOME | End: 2022-05-03
Attending: PEDIATRICS
Payer: COMMERCIAL

## 2022-05-03 VITALS
DIASTOLIC BLOOD PRESSURE: 88 MMHG | WEIGHT: 154.13 LBS | HEIGHT: 60 IN | BODY MASS INDEX: 30.26 KG/M2 | HEART RATE: 72 BPM | OXYGEN SATURATION: 97 % | SYSTOLIC BLOOD PRESSURE: 132 MMHG | TEMPERATURE: 98 F

## 2022-05-03 DIAGNOSIS — E78.5 HYPERLIPIDEMIA, UNSPECIFIED HYPERLIPIDEMIA TYPE: ICD-10-CM

## 2022-05-03 DIAGNOSIS — G20.A1 PARKINSON'S DISEASE: ICD-10-CM

## 2022-05-03 DIAGNOSIS — Z01.818 PREOPERATIVE CLEARANCE: ICD-10-CM

## 2022-05-03 DIAGNOSIS — R73.03 PREDIABETES: ICD-10-CM

## 2022-05-03 DIAGNOSIS — M21.612 BUNION OF GREAT TOE OF LEFT FOOT: Primary | ICD-10-CM

## 2022-05-03 DIAGNOSIS — I10 ESSENTIAL HYPERTENSION: ICD-10-CM

## 2022-05-03 PROCEDURE — 93010 ELECTROCARDIOGRAM REPORT: CPT | Mod: ,,, | Performed by: INTERNAL MEDICINE

## 2022-05-03 PROCEDURE — 99214 PR OFFICE/OUTPT VISIT, EST, LEVL IV, 30-39 MIN: ICD-10-PCS | Mod: S$GLB,,, | Performed by: PEDIATRICS

## 2022-05-03 PROCEDURE — 99999 PR PBB SHADOW E&M-EST. PATIENT-LVL IV: ICD-10-PCS | Mod: PBBFAC,,, | Performed by: PEDIATRICS

## 2022-05-03 PROCEDURE — 99999 PR PBB SHADOW E&M-EST. PATIENT-LVL IV: CPT | Mod: PBBFAC,,, | Performed by: PEDIATRICS

## 2022-05-03 PROCEDURE — 93005 ELECTROCARDIOGRAM TRACING: CPT

## 2022-05-03 PROCEDURE — 93010 EKG 12-LEAD: ICD-10-PCS | Mod: ,,, | Performed by: INTERNAL MEDICINE

## 2022-05-03 PROCEDURE — 99214 OFFICE O/P EST MOD 30 MIN: CPT | Mod: S$GLB,,, | Performed by: PEDIATRICS

## 2022-05-03 NOTE — PROGRESS NOTES
Subjective:       Patient ID: Rosalva Stauffer is a 57 y.o. female.    Chief Complaint: Pre-op Exam    Rosalva Stauffer is a 57 y.o. female who presents to clinic for pre-op clearance for a L bunionectomy by Dr. Sandeep Armendariz on 5/10/22.     PMHx, PSHx, SocHx, and FHx reviewed and discussed with patient.    Patient Active Problem List:     Essential hypertension     Hyperlipidemia     Seasonal allergies     Parkinson's disease     Prediabetes    Past Surgical History:  No date:  SECTION, LOW TRANSVERSE      Comment:  x1  2018: COLONOSCOPY; N/A      Comment:  Procedure: COLONOSCOPY;  Surgeon: Carlos Oconnell III, MD;  Location: [facility];  Service: Endoscopy;                 Laterality: N/A;  No date: GANGLION CYST EXCISION  No date: HERNIA REPAIR  No date: HYSTERECTOMY  2013: Lysis of adhesions and enterotomy closure  2013: ROBOTIC ASSISTED HYSTERECTOMY      Comment:  With unilateral SO  2017: SHOULDER ARTHROSCOPY W/ ROTATOR CUFF REPAIR; Left  No date: Submaxillary gland drainage    Review of patient's family history indicates:  Problem: Hypertension      Relation: Mother          Age of Onset: (Not Specified)  Problem: Cancer      Relation: Mother          Age of Onset: (Not Specified)  Problem: Colon cancer      Relation: Mother          Age of Onset: (Not Specified)  Problem: Diabetes      Relation: Father          Age of Onset: (Not Specified)  Problem: Hypertension      Relation: Father          Age of Onset: (Not Specified)  Problem: Hypertension      Relation: Sister          Age of Onset: (Not Specified)  Problem: Thyroid disease      Relation: Sister          Age of Onset: (Not Specified)  Problem: Hypertension      Relation: Brother          Age of Onset: (Not Specified)  Problem: Hypertension      Relation: Sister          Age of Onset: (Not Specified)  Problem: Hypertension      Relation: Sister          Age of Onset: (Not Specified)  Problem:  Hypertension      Relation: Brother          Age of Onset: (Not Specified)  Problem: Cataracts      Relation: Maternal Grandmother          Age of Onset: (Not Specified)      Social History    Socioeconomic History      Marital status:     Occupational History        Employer: [employer]    Tobacco Use      Smoking status: Never Smoker      Smokeless tobacco: Never Used    Substance and Sexual Activity      Alcohol use: No        Alcohol/week: 0.0 standard drinks      Drug use: No      Sexual activity: Not Currently    Social History Narrative      The patient is  and has one child. She has remarried her female partner. 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               Social Determinants of Health  Financial Resource Strain: Low Risk       Difficulty of Paying Living Expenses: Not hard at all  Food Insecurity: No Food Insecurity      Worried About Running Out of Food in the Last Year: Never true      Ran Out of Food in the Last Year: Never true  Transportation Needs: No Transportation Needs      Lack of Transportation (Medical): No      Lack of Transportation (Non-Medical): No  Physical Activity: Insufficiently Active      Days of Exercise per Week: 3 days      Minutes of Exercise per Session: 30 min  Stress: No Stress Concern Present      Feeling of Stress : Only a little  Social Connections: Unknown      Frequency of Communication with Friends and Family: More than three times a week      Frequency of Social Gatherings with Friends and Family: Once a week      Active Member of Clubs or Organizations: No      Attends Club or Organization Meetings: Never      Marital Status:   Housing Stability: Low Risk       Unable to Pay for Housing in the Last Year: No      Number of Places Lived in the Last Year: 1      Unstable Housing in the Last Year: No       Review of Systems   Constitutional: Negative for activity change, appetite change, chills, diaphoresis, fatigue, fever  and unexpected weight change.   HENT: Negative for nasal congestion, ear pain, mouth sores, nosebleeds, postnasal drip, rhinorrhea, sneezing and sore throat.    Eyes: Negative for photophobia, pain, discharge, redness and visual disturbance.   Respiratory: Negative for cough, chest tightness, shortness of breath, wheezing and stridor.    Cardiovascular: Negative for chest pain, palpitations and leg swelling.   Gastrointestinal: Negative for constipation, diarrhea, nausea and vomiting.   Genitourinary: Negative for decreased urine volume, difficulty urinating, dysuria, flank pain, frequency, hematuria and urgency.   Musculoskeletal: Negative for arthralgias, back pain, joint swelling, neck pain and neck stiffness.   Integumentary:  Negative for color change and rash.   Neurological: Negative for dizziness, syncope, speech difficulty, weakness, light-headedness and headaches.   Hematological: Negative for adenopathy. Does not bruise/bleed easily.   Psychiatric/Behavioral: Negative for confusion, decreased concentration, dysphoric mood, hallucinations, sleep disturbance and suicidal ideas. The patient is not nervous/anxious.    All other systems reviewed and are negative.        Objective:      Physical Exam  Vitals and nursing note reviewed.   Constitutional:       General: She is not in acute distress.     Appearance: She is well-developed.   Neck:      Thyroid: No thyromegaly.      Vascular: No JVD.   Cardiovascular:      Rate and Rhythm: Normal rate and regular rhythm.      Heart sounds: Normal heart sounds. No murmur heard.  Pulmonary:      Effort: Pulmonary effort is normal. No respiratory distress.      Breath sounds: Normal breath sounds. No wheezing or rales.   Abdominal:      General: There is no distension.      Palpations: Abdomen is soft. There is no mass.      Tenderness: There is no abdominal tenderness. There is no guarding.   Musculoskeletal:      Right lower leg: No edema.      Left lower leg: No  edema.   Lymphadenopathy:      Cervical: No cervical adenopathy.   Skin:     Capillary Refill: Capillary refill takes less than 2 seconds.      Findings: No rash.   Neurological:      General: No focal deficit present.      Mental Status: She is alert and oriented to person, place, and time.      Cranial Nerves: No cranial nerve deficit.      Coordination: Coordination normal.   Psychiatric:         Mood and Affect: Mood normal.         Behavior: Behavior normal.         Thought Content: Thought content normal.         Judgment: Judgment normal.         Assessment:       Problem List Items Addressed This Visit     Essential hypertension    Hyperlipidemia    Parkinson's disease    Prediabetes      Other Visit Diagnoses     Bunion of great toe of left foot    -  Primary    Preoperative clearance        Relevant Orders    Basic Metabolic Panel    CBC Auto Differential    EKG 12-lead          Plan:     Bunion of great toe of left foot    Prediabetes    Parkinson's disease    Hyperlipidemia, unspecified hyperlipidemia type    Essential hypertension    Preoperative clearance  -     Basic Metabolic Panel; Future; Expected date: 05/03/2022  -     CBC Auto Differential; Future; Expected date: 05/03/2022  -     EKG 12-lead; Future         Maintain all medications with a sip of water the AM of surgery, hold all NSAIDs 5-7 days prior, await pre-op labs otherwise she is at acceptable risk to proceed with surgery, note to Dr. Marcello Elias Attestation:   I, Nba Aguilar, am scribing for, and in the presence of, Dr. Marciaon Gomez Jr. I performed the above scribed service and the documentation accurately describes the services I performed. I attest to the accuracy of the note.    I, Dr. Marciano Gomez Jr, reviewed documentation as scribed above. I personally performed the services described in this documentation.  I agree that the record reflects my personal performance and is accurate and complete. Marciano Gomez  MD Jaja.  05/03/2022

## 2022-05-06 ENCOUNTER — TELEPHONE (OUTPATIENT)
Dept: INTERNAL MEDICINE | Facility: CLINIC | Age: 57
End: 2022-05-06
Payer: COMMERCIAL

## 2022-05-06 NOTE — TELEPHONE ENCOUNTER
See result note 05/03/22. Printed and fax EKG and report, lab results and OV 05/03/22 to Dr. Marcello loza/ [facility] F#[phone]. Returned call to Suzi loza/ Dr. Armendariz, no ans, lvm on nurses' line fax sent to her per request at confirmed F# above.

## 2022-05-06 NOTE — TELEPHONE ENCOUNTER
----- Message from Chelsea Blanton MA sent at 5/6/2022  8:56 AM CDT -----  Contact: Dr Sandeep Armendariz's office    ----- Message -----  From: Magaly Garcia MA  Sent: 5/6/2022   8:28 AM CDT  To: Jason Bautista Jr Staff      ----- Message -----  From: Margaret Flynn  Sent: 5/6/2022   8:24 AM CDT  To: Willy STANLEY Staff    .Type:  Needs Medical Advice    Who Called: Suzi  Symptoms (please be specific):   How long has patient had these symptoms:    Pharmacy name and phone #:    Would the patient rather a call back or a response via [facility]? call  Best Call Back Number: [phone] or fax  [phone]   Additional Information: Caller is requesting a callback from the nurse in regards to them needing the patients Labs and EKG for the pt clearance to be completed. Please The caller is needing this by 1:00 pm today.

## 2022-05-06 NOTE — TELEPHONE ENCOUNTER
----- Message from Chelsea Blanton MA sent at 5/6/2022  8:56 AM CDT -----  Contact: Dr Sandeep Armendariz's office    ----- Message -----  From: Magaly Garcia MA  Sent: 5/6/2022   8:28 AM CDT  To: Jason Bautista Jr Staff      ----- Message -----  From: Margaret Flynn  Sent: 5/6/2022   8:24 AM CDT  To: Willy STANLEY Staff    .Type:  Needs Medical Advice    Who Called: Suzi  Symptoms (please be specific):   How long has patient had these symptoms:    Pharmacy name and phone #:    Would the patient rather a call back or a response via [facility]? call  Best Call Back Number: [phone] or fax  [phone]   Additional Information: Caller is requesting a callback from the nurse in regards to them needing the patients Labs and EKG for the pt clearance to be completed. Please The caller is needing this by 1:00 pm today.

## 2022-05-17 ENCOUNTER — TELEPHONE (OUTPATIENT)
Dept: FAMILY MEDICINE | Facility: CLINIC | Age: 57
End: 2022-05-17

## 2022-05-17 NOTE — TELEPHONE ENCOUNTER
----- Message from Carmenza Venegas sent at 5/17/2022  3:33 PM CDT -----  Pt would like the nurse to call her pertaining to any follow up appts she isn't aware of. Call back number is [phone]. Thx. El

## 2022-05-31 ENCOUNTER — TELEPHONE (OUTPATIENT)
Dept: INTERNAL MEDICINE | Facility: CLINIC | Age: 57
End: 2022-05-31
Payer: COMMERCIAL

## 2022-06-02 ENCOUNTER — PATIENT MESSAGE (OUTPATIENT)
Dept: INTERNAL MEDICINE | Facility: CLINIC | Age: 57
End: 2022-06-02
Payer: COMMERCIAL

## 2022-06-06 ENCOUNTER — TELEPHONE (OUTPATIENT)
Dept: INTERNAL MEDICINE | Facility: CLINIC | Age: 57
End: 2022-06-06
Payer: COMMERCIAL

## 2022-06-06 NOTE — TELEPHONE ENCOUNTER
S/w pt reporting pre-op form was faxed last wk, pt reports per [facility] they did not receive forms. Advised pt would re-fax everything today, pt prakash.

## 2022-07-20 ENCOUNTER — PATIENT MESSAGE (OUTPATIENT)
Dept: FAMILY MEDICINE | Facility: CLINIC | Age: 57
End: 2022-07-20
Payer: COMMERCIAL

## 2022-07-20 NOTE — TELEPHONE ENCOUNTER
To Whom It May Concern:    Rosalva Stauffer is under my care.  Due to parkinsonism, she needs to avoid extreme temperatures.  Please allow her to avoid work in extreme cold or heat.  Please contact me if you have any further questions.    Sincerely,

## 2022-08-01 RX ORDER — HYDROCHLOROTHIAZIDE 12.5 MG/1
12.5 CAPSULE ORAL DAILY
Qty: 90 CAPSULE | Refills: 0 | Status: SHIPPED | OUTPATIENT
Start: 2022-08-01 | End: 2022-09-02 | Stop reason: SDUPTHER

## 2022-08-01 NOTE — TELEPHONE ENCOUNTER
No new care gaps identified.  [facility] Embedded Care Gaps. Reference number: [id number]. 8/01/2022   11:07:29 AM RAÚLT

## 2022-08-02 ENCOUNTER — PATIENT MESSAGE (OUTPATIENT)
Dept: FAMILY MEDICINE | Facility: CLINIC | Age: 57
End: 2022-08-02
Payer: COMMERCIAL

## 2022-08-26 ENCOUNTER — OFFICE VISIT (OUTPATIENT)
Dept: FAMILY MEDICINE | Facility: CLINIC | Age: 57
End: 2022-08-26
Payer: COMMERCIAL

## 2022-08-26 VITALS
HEIGHT: 60 IN | OXYGEN SATURATION: 99 % | BODY MASS INDEX: 29.22 KG/M2 | DIASTOLIC BLOOD PRESSURE: 78 MMHG | HEART RATE: 73 BPM | SYSTOLIC BLOOD PRESSURE: 120 MMHG | WEIGHT: 148.81 LBS | TEMPERATURE: 100 F | RESPIRATION RATE: 18 BRPM

## 2022-08-26 DIAGNOSIS — R51.9 ACUTE NONINTRACTABLE HEADACHE, UNSPECIFIED HEADACHE TYPE: Primary | ICD-10-CM

## 2022-08-26 DIAGNOSIS — J30.2 SEASONAL ALLERGIC RHINITIS, UNSPECIFIED TRIGGER: ICD-10-CM

## 2022-08-26 LAB
CTP QC/QA: YES
SARS-COV-2 RDRP RESP QL NAA+PROBE: NEGATIVE

## 2022-08-26 PROCEDURE — U0002 COVID-19 LAB TEST NON-CDC: HCPCS | Mod: QW,S$GLB,, | Performed by: FAMILY MEDICINE

## 2022-08-26 PROCEDURE — 99213 PR OFFICE/OUTPT VISIT, EST, LEVL III, 20-29 MIN: ICD-10-PCS | Mod: 25,S$GLB,, | Performed by: FAMILY MEDICINE

## 2022-08-26 PROCEDURE — U0002: ICD-10-PCS | Mod: QW,S$GLB,, | Performed by: FAMILY MEDICINE

## 2022-08-26 PROCEDURE — 99999 PR PBB SHADOW E&M-EST. PATIENT-LVL IV: CPT | Mod: PBBFAC,,, | Performed by: FAMILY MEDICINE

## 2022-08-26 PROCEDURE — 99999 PR PBB SHADOW E&M-EST. PATIENT-LVL IV: ICD-10-PCS | Mod: PBBFAC,,, | Performed by: FAMILY MEDICINE

## 2022-08-26 PROCEDURE — 99213 OFFICE O/P EST LOW 20 MIN: CPT | Mod: 25,S$GLB,, | Performed by: FAMILY MEDICINE

## 2022-08-26 RX ORDER — AZELASTINE 1 MG/ML
1 SPRAY, METERED NASAL 2 TIMES DAILY
Qty: 30 ML | Refills: 0 | Status: SHIPPED | OUTPATIENT
Start: 2022-08-26 | End: 2023-08-15 | Stop reason: SDUPTHER

## 2022-08-26 NOTE — PROGRESS NOTES
Rosalva Stauffer    Chief Complaint   Patient presents with    Hypertension    Headache       History of Present Illness:   Ms. Willy Stauffer comes in today with complaint of having recent elevation of blood pressure with headache on yesterday.  She states her blood pressure was 150/90 yesterday and last night using list in our monitors.  She states during those times she had headache and took Aleve night with help.  She states she had headache with sinus pressure this morning.  She states headache is 3/10 on pain scale today.  If she has not been using nasal spray.  She does not smoke cigarettes.  She denies recent exposure to COVID infection.  She states she has received COVID vaccine series with booster.  She has taken blood pressure was good this morning.  She could alcohol but denies tobacco illicit drug use.  She states she got much which he eating exercises 2 times a week, 15-20 minutes each time.    Otherwise, she denies having fever, chills, fatigue, appetite changes; shortness of breath, cough, wheezing; chest pain, palpitations, leg swelling abdominal pain, nausea, vomiting, diarrhea, constipation; unusual urinary symptoms; polydipsia, polyphagia, polyuria, hot or cold intolerance; back pain; other sinus symptoms; anxiety, depression, homicidal or suicidal thoughts.           She is followed by PCP Dr. Patterson.    POCT Rapid COVID ordered today - (-).      Current Outpatient Medications   Medication Sig    azelastine (ASTELIN) 137 mcg (0.1 %) nasal spray 1 spray by Nasal route 2 (two) times daily.    carbidopa-levodopa  mg (SINEMET)  mg per tablet     hydroCHLOROthiazide (MICROZIDE) 12.5 mg capsule Take 1 capsule (12.5 mg total) by mouth once daily.    multivitamin capsule Take 1 capsule by mouth once daily.    pramipexole (MIRAPEX) 1 MG tablet Take 1 mg by mouth nightly. Pt states she takes 1mg at night    rasagiline (AZILECT) 1 mg Tab Take 1 mg by mouth once daily.    cyanocobalamin  (VITAMIN B-12) 1000 MCG tablet Take 100 mcg by mouth once daily.       Review of Systems   Constitutional:  Negative for appetite change, chills, fatigue and fever.   HENT:  Positive for sinus pressure. Negative for congestion, postnasal drip, rhinorrhea, sinus pain, sneezing and sore throat.         See history of present illness.   Respiratory:  Negative for cough, shortness of breath and wheezing.    Cardiovascular:  Negative for chest pain, palpitations and leg swelling.        See history of present illness.   Gastrointestinal:  Negative for abdominal pain, constipation, diarrhea, nausea and vomiting.   Endocrine: Negative for cold intolerance, heat intolerance, polydipsia, polyphagia and polyuria.   Genitourinary:  Negative for difficulty urinating.   Musculoskeletal:  Negative for arthralgias, back pain and myalgias.   Neurological:  Positive for headaches.        See history of present illness.   Psychiatric/Behavioral:  Negative for dysphoric mood and suicidal ideas. The patient is not nervous/anxious.         Negative for homicidal ideas.     Objective:  Physical Exam  Vitals reviewed.   Constitutional:       General: She is not in acute distress.     Appearance: Normal appearance. She is not ill-appearing, toxic-appearing or diaphoretic.      Comments: Pleasant.   HENT:      Head: Normocephalic and atraumatic.      Comments: Non tender sinuses.     Right Ear: Tympanic membrane, ear canal and external ear normal. There is no impacted cerumen.      Left Ear: Tympanic membrane, ear canal and external ear normal. There is no impacted cerumen.      Nose: Congestion present. No rhinorrhea.      Mouth/Throat:      Mouth: Mucous membranes are moist.      Pharynx: Oropharynx is clear. No oropharyngeal exudate or posterior oropharyngeal erythema.   Eyes:      General:         Right eye: No discharge.         Left eye: No discharge.      Extraocular Movements: Extraocular movements intact.      Conjunctiva/sclera:  Conjunctivae normal.      Pupils: Pupils are equal, round, and reactive to light.   Cardiovascular:      Rate and Rhythm: Normal rate and regular rhythm.      Pulses: Normal pulses.      Heart sounds: No murmur heard.  Pulmonary:      Effort: Pulmonary effort is normal. No respiratory distress.      Breath sounds: Normal breath sounds. No wheezing.   Abdominal:      General: Bowel sounds are normal. There is no distension.      Palpations: Abdomen is soft. There is no mass.      Tenderness: There is no abdominal tenderness. There is no guarding or rebound.   Musculoskeletal:         General: No swelling or tenderness. Normal range of motion.      Cervical back: Normal range of motion and neck supple. No tenderness.      Comments: She is ambulatory without problems.   Lymphadenopathy:      Cervical: No cervical adenopathy.   Skin:     General: Skin is warm.   Neurological:      General: No focal deficit present.      Mental Status: She is alert and oriented to person, place, and time.   Psychiatric:         Mood and Affect: Mood normal.         Behavior: Behavior normal.         Thought Content: Thought content normal.         Judgment: Judgment normal.       ASSESSMENT:  1. Acute nonintractable headache, unspecified headache type    2. Seasonal allergic rhinitis, unspecified trigger        PLAN:  Rosalva was seen today for hypertension and headache.    Diagnoses and all orders for this visit:    Acute nonintractable headache, unspecified headache type  -     POCT COVID-19 Rapid Screening    Seasonal allergic rhinitis, unspecified trigger  -     azelastine (ASTELIN) 137 mcg (0.1 %) nasal spray; 1 spray (137 mcg total) by Nasal route 2 (two) times daily.     Continue current medications (including use Astelin), follow low sodium, low cholesterol, low carb diet, daily walks.  Prescription refill as noted above.  Follow up if symptoms worsen or fail to improve.

## 2022-09-02 ENCOUNTER — OFFICE VISIT (OUTPATIENT)
Dept: FAMILY MEDICINE | Facility: CLINIC | Age: 57
End: 2022-09-02
Payer: COMMERCIAL

## 2022-09-02 VITALS
WEIGHT: 151.69 LBS | DIASTOLIC BLOOD PRESSURE: 71 MMHG | HEART RATE: 99 BPM | HEIGHT: 60 IN | TEMPERATURE: 97 F | SYSTOLIC BLOOD PRESSURE: 111 MMHG | BODY MASS INDEX: 29.78 KG/M2 | OXYGEN SATURATION: 97 %

## 2022-09-02 DIAGNOSIS — G20.A1 PARKINSON'S DISEASE: ICD-10-CM

## 2022-09-02 DIAGNOSIS — Z00.00 PREVENTATIVE HEALTH CARE: Primary | ICD-10-CM

## 2022-09-02 DIAGNOSIS — E78.5 HYPERLIPIDEMIA, UNSPECIFIED HYPERLIPIDEMIA TYPE: ICD-10-CM

## 2022-09-02 DIAGNOSIS — I10 ESSENTIAL HYPERTENSION: ICD-10-CM

## 2022-09-02 DIAGNOSIS — R73.03 PREDIABETES: ICD-10-CM

## 2022-09-02 PROCEDURE — 99999 PR PBB SHADOW E&M-EST. PATIENT-LVL IV: ICD-10-PCS | Mod: PBBFAC,,, | Performed by: FAMILY MEDICINE

## 2022-09-02 PROCEDURE — 99396 PREV VISIT EST AGE 40-64: CPT | Mod: S$GLB,,, | Performed by: FAMILY MEDICINE

## 2022-09-02 PROCEDURE — 99999 PR PBB SHADOW E&M-EST. PATIENT-LVL IV: CPT | Mod: PBBFAC,,, | Performed by: FAMILY MEDICINE

## 2022-09-02 PROCEDURE — 99396 PR PREVENTIVE VISIT,EST,40-64: ICD-10-PCS | Mod: S$GLB,,, | Performed by: FAMILY MEDICINE

## 2022-09-02 RX ORDER — HYDROCHLOROTHIAZIDE 12.5 MG/1
12.5 CAPSULE ORAL DAILY
Qty: 90 CAPSULE | Refills: 3 | Status: SHIPPED | OUTPATIENT
Start: 2022-09-02 | End: 2023-09-24 | Stop reason: SDUPTHER

## 2022-09-07 ENCOUNTER — LAB VISIT (OUTPATIENT)
Dept: LAB | Facility: HOSPITAL | Age: 57
End: 2022-09-07
Attending: FAMILY MEDICINE
Payer: COMMERCIAL

## 2022-09-07 DIAGNOSIS — R73.03 PREDIABETES: ICD-10-CM

## 2022-09-07 DIAGNOSIS — Z00.00 PREVENTATIVE HEALTH CARE: ICD-10-CM

## 2022-09-07 PROCEDURE — 36415 COLL VENOUS BLD VENIPUNCTURE: CPT | Mod: PO | Performed by: FAMILY MEDICINE

## 2022-09-07 PROCEDURE — 84443 ASSAY THYROID STIM HORMONE: CPT | Performed by: FAMILY MEDICINE

## 2022-09-07 PROCEDURE — 80061 LIPID PANEL: CPT | Performed by: FAMILY MEDICINE

## 2022-09-07 PROCEDURE — 83036 HEMOGLOBIN GLYCOSYLATED A1C: CPT | Performed by: FAMILY MEDICINE

## 2022-09-07 PROCEDURE — 80053 COMPREHEN METABOLIC PANEL: CPT | Performed by: FAMILY MEDICINE

## 2022-09-07 PROCEDURE — 85025 COMPLETE CBC W/AUTO DIFF WBC: CPT | Performed by: FAMILY MEDICINE

## 2022-09-08 LAB
ALBUMIN SERPL BCP-MCNC: 4.3 G/DL (ref 3.5–5.2)
ALP SERPL-CCNC: 76 U/L (ref 55–135)
ALT SERPL W/O P-5'-P-CCNC: 48 U/L (ref 10–44)
ANION GAP SERPL CALC-SCNC: 12 MMOL/L (ref 8–16)
AST SERPL-CCNC: 49 U/L (ref 10–40)
BASOPHILS # BLD AUTO: 0.02 K/UL (ref 0–0.2)
BASOPHILS NFR BLD: 0.4 % (ref 0–1.9)
BILIRUB SERPL-MCNC: 0.4 MG/DL (ref 0.1–1)
BUN SERPL-MCNC: 14 MG/DL (ref 6–20)
CALCIUM SERPL-MCNC: 10 MG/DL (ref 8.7–10.5)
CHLORIDE SERPL-SCNC: 100 MMOL/L (ref 95–110)
CHOLEST SERPL-MCNC: 336 MG/DL (ref 120–199)
CHOLEST/HDLC SERPL: 3.5 {RATIO} (ref 2–5)
CO2 SERPL-SCNC: 26 MMOL/L (ref 23–29)
CREAT SERPL-MCNC: 1 MG/DL (ref 0.5–1.4)
DIFFERENTIAL METHOD: ABNORMAL
EOSINOPHIL # BLD AUTO: 0.1 K/UL (ref 0–0.5)
EOSINOPHIL NFR BLD: 1.2 % (ref 0–8)
ERYTHROCYTE [DISTWIDTH] IN BLOOD BY AUTOMATED COUNT: 13.6 % (ref 11.5–14.5)
EST. GFR  (NO RACE VARIABLE): >60 ML/MIN/1.73 M^2
ESTIMATED AVG GLUCOSE: 131 MG/DL (ref 68–131)
GLUCOSE SERPL-MCNC: 98 MG/DL (ref 70–110)
HBA1C MFR BLD: 6.2 % (ref 4–5.6)
HCT VFR BLD AUTO: 41.5 % (ref 37–48.5)
HDLC SERPL-MCNC: 96 MG/DL (ref 40–75)
HDLC SERPL: 28.6 % (ref 20–50)
HGB BLD-MCNC: 13 G/DL (ref 12–16)
IMM GRANULOCYTES # BLD AUTO: 0.01 K/UL (ref 0–0.04)
IMM GRANULOCYTES NFR BLD AUTO: 0.2 % (ref 0–0.5)
LDLC SERPL CALC-MCNC: 232.2 MG/DL (ref 63–159)
LYMPHOCYTES # BLD AUTO: 1.9 K/UL (ref 1–4.8)
LYMPHOCYTES NFR BLD: 38.2 % (ref 18–48)
MCH RBC QN AUTO: 29.4 PG (ref 27–31)
MCHC RBC AUTO-ENTMCNC: 31.3 G/DL (ref 32–36)
MCV RBC AUTO: 94 FL (ref 82–98)
MONOCYTES # BLD AUTO: 0.3 K/UL (ref 0.3–1)
MONOCYTES NFR BLD: 5.9 % (ref 4–15)
NEUTROPHILS # BLD AUTO: 2.7 K/UL (ref 1.8–7.7)
NEUTROPHILS NFR BLD: 54.1 % (ref 38–73)
NONHDLC SERPL-MCNC: 240 MG/DL
NRBC BLD-RTO: 0 /100 WBC
PLATELET # BLD AUTO: 266 K/UL (ref 150–450)
PMV BLD AUTO: 11.5 FL (ref 9.2–12.9)
POTASSIUM SERPL-SCNC: 4.7 MMOL/L (ref 3.5–5.1)
PROT SERPL-MCNC: 7.6 G/DL (ref 6–8.4)
RBC # BLD AUTO: 4.42 M/UL (ref 4–5.4)
SODIUM SERPL-SCNC: 138 MMOL/L (ref 136–145)
TRIGL SERPL-MCNC: 39 MG/DL (ref 30–150)
TSH SERPL DL<=0.005 MIU/L-ACNC: 0.78 UIU/ML (ref 0.4–4)
WBC # BLD AUTO: 4.9 K/UL (ref 3.9–12.7)

## 2022-10-25 ENCOUNTER — PATIENT MESSAGE (OUTPATIENT)
Dept: FAMILY MEDICINE | Facility: CLINIC | Age: 57
End: 2022-10-25
Payer: COMMERCIAL

## 2022-10-26 ENCOUNTER — TELEPHONE (OUTPATIENT)
Dept: FAMILY MEDICINE | Facility: CLINIC | Age: 57
End: 2022-10-26
Payer: COMMERCIAL

## 2022-10-26 DIAGNOSIS — Z12.31 ENCOUNTER FOR SCREENING MAMMOGRAM FOR MALIGNANT NEOPLASM OF BREAST: Primary | ICD-10-CM

## 2022-10-27 ENCOUNTER — PATIENT MESSAGE (OUTPATIENT)
Dept: FAMILY MEDICINE | Facility: CLINIC | Age: 57
End: 2022-10-27
Payer: COMMERCIAL

## 2022-10-28 ENCOUNTER — TELEPHONE (OUTPATIENT)
Dept: FAMILY MEDICINE | Facility: CLINIC | Age: 57
End: 2022-10-28
Payer: COMMERCIAL

## 2022-10-28 NOTE — TELEPHONE ENCOUNTER
----- Message from Lamar Tse sent at 10/28/2022  8:07 AM CDT -----  Contact: [facility]oe[facility]  Patient is calling to speak with the nurse regarding medication. Reports the medication hydroCHLOROthiazide (MICROZIDE) 12.5 mg capsule has a recall on it, also states needing to know what medication to take. Please give patient a call back at .[phone]   Thanks macho

## 2022-12-06 ENCOUNTER — PATIENT MESSAGE (OUTPATIENT)
Dept: FAMILY MEDICINE | Facility: CLINIC | Age: 57
End: 2022-12-06
Payer: COMMERCIAL

## 2022-12-21 ENCOUNTER — PATIENT MESSAGE (OUTPATIENT)
Dept: FAMILY MEDICINE | Facility: CLINIC | Age: 57
End: 2022-12-21
Payer: COMMERCIAL

## 2023-01-19 ENCOUNTER — PATIENT MESSAGE (OUTPATIENT)
Dept: FAMILY MEDICINE | Facility: CLINIC | Age: 58
End: 2023-01-19
Payer: COMMERCIAL

## 2023-01-19 DIAGNOSIS — Z12.11 COLON CANCER SCREENING: Primary | ICD-10-CM

## 2023-01-26 ENCOUNTER — HOSPITAL ENCOUNTER (OUTPATIENT)
Dept: RADIOLOGY | Facility: HOSPITAL | Age: 58
Discharge: HOME | End: 2023-01-26
Attending: FAMILY MEDICINE
Payer: COMMERCIAL

## 2023-01-26 ENCOUNTER — PATIENT MESSAGE (OUTPATIENT)
Dept: FAMILY MEDICINE | Facility: CLINIC | Age: 58
End: 2023-01-26
Payer: COMMERCIAL

## 2023-01-26 ENCOUNTER — TELEPHONE (OUTPATIENT)
Dept: FAMILY MEDICINE | Facility: CLINIC | Age: 58
End: 2023-01-26
Payer: COMMERCIAL

## 2023-01-26 DIAGNOSIS — R93.89 ABNORMAL X-RAY: ICD-10-CM

## 2023-01-26 DIAGNOSIS — Z12.31 ENCOUNTER FOR SCREENING MAMMOGRAM FOR MALIGNANT NEOPLASM OF BREAST: ICD-10-CM

## 2023-01-26 DIAGNOSIS — G89.29 CHRONIC BILATERAL LOW BACK PAIN, UNSPECIFIED WHETHER SCIATICA PRESENT: Primary | ICD-10-CM

## 2023-01-26 DIAGNOSIS — M54.50 CHRONIC BILATERAL LOW BACK PAIN, UNSPECIFIED WHETHER SCIATICA PRESENT: Primary | ICD-10-CM

## 2023-01-26 PROCEDURE — 77067 SCR MAMMO BI INCL CAD: CPT | Mod: TC

## 2023-01-26 PROCEDURE — 77067 MAMMO DIGITAL SCREENING BILAT WITH TOMO: ICD-10-PCS | Mod: 26,,, | Performed by: RADIOLOGY

## 2023-01-26 PROCEDURE — 77063 BREAST TOMOSYNTHESIS BI: CPT | Mod: 26,,, | Performed by: RADIOLOGY

## 2023-01-26 PROCEDURE — 77063 MAMMO DIGITAL SCREENING BILAT WITH TOMO: ICD-10-PCS | Mod: 26,,, | Performed by: RADIOLOGY

## 2023-01-26 PROCEDURE — 77067 SCR MAMMO BI INCL CAD: CPT | Mod: 26,,, | Performed by: RADIOLOGY

## 2023-01-26 NOTE — TELEPHONE ENCOUNTER
They will only read the actual x-ray film and I do not think fill except an x-ray from a chiropractor.  She may need to repeat x-rays here at the clinic.

## 2023-01-26 NOTE — TELEPHONE ENCOUNTER
Pt is asking are you going to sent her xray to a radiologist to read it please advise     ----- Message from Ganga Rodriges sent at 1/26/2023  2:42 PM CST -----  Contact: [phone]  Patient would like to consult with a nurse in regards to an email that was sent to Dr. Patterson. Please call back at [phone]. Thanks KD

## 2023-01-27 ENCOUNTER — HOSPITAL ENCOUNTER (OUTPATIENT)
Dept: PREADMISSION TESTING | Facility: HOSPITAL | Age: 58
Discharge: HOME | End: 2023-01-27
Attending: FAMILY MEDICINE
Payer: COMMERCIAL

## 2023-01-27 DIAGNOSIS — Z12.11 COLON CANCER SCREENING: Primary | ICD-10-CM

## 2023-01-27 RX ORDER — POLYETHYLENE GLYCOL 3350, SODIUM SULFATE ANHYDROUS, SODIUM BICARBONATE, SODIUM CHLORIDE, POTASSIUM CHLORIDE 236; 22.74; 6.74; 5.86; 2.97 G/4L; G/4L; G/4L; G/4L; G/4L
4 POWDER, FOR SOLUTION ORAL ONCE
Qty: 4000 ML | Refills: 0 | Status: SHIPPED | OUTPATIENT
Start: 2023-01-27 | End: 2023-01-27

## 2023-01-27 NOTE — TELEPHONE ENCOUNTER
Pt stated that she will come in to the clinic to get her xray done so she will be able to get the results please advise

## 2023-02-01 ENCOUNTER — PATIENT MESSAGE (OUTPATIENT)
Dept: FAMILY MEDICINE | Facility: CLINIC | Age: 58
End: 2023-02-01
Payer: COMMERCIAL

## 2023-02-01 ENCOUNTER — HOSPITAL ENCOUNTER (OUTPATIENT)
Dept: RADIOLOGY | Facility: HOSPITAL | Age: 58
Discharge: HOME | End: 2023-02-01
Attending: FAMILY MEDICINE
Payer: COMMERCIAL

## 2023-02-01 DIAGNOSIS — G89.29 CHRONIC BILATERAL LOW BACK PAIN, UNSPECIFIED WHETHER SCIATICA PRESENT: ICD-10-CM

## 2023-02-01 DIAGNOSIS — M54.50 CHRONIC BILATERAL LOW BACK PAIN, UNSPECIFIED WHETHER SCIATICA PRESENT: ICD-10-CM

## 2023-02-01 DIAGNOSIS — R93.89 ABNORMAL X-RAY: ICD-10-CM

## 2023-02-01 PROCEDURE — 72100 XR LUMBAR SPINE AP AND LATERAL: ICD-10-PCS | Mod: 26,,, | Performed by: RADIOLOGY

## 2023-02-01 PROCEDURE — 72100 X-RAY EXAM L-S SPINE 2/3 VWS: CPT | Mod: TC,FY,PO

## 2023-02-01 PROCEDURE — 72100 X-RAY EXAM L-S SPINE 2/3 VWS: CPT | Mod: 26,,, | Performed by: RADIOLOGY

## 2023-02-10 ENCOUNTER — ANESTHESIA EVENT (OUTPATIENT)
Dept: ENDOSCOPY | Facility: HOSPITAL | Age: 58
End: 2023-02-10
Payer: COMMERCIAL

## 2023-02-10 NOTE — ANESTHESIA PREPROCEDURE EVALUATION
02/10/2023  Rosalva Stauffer is a 57 y.o., female.  Past Medical History:   Diagnosis Date    DDD (degenerative disc disease), cervical     Hyperlipidemia     Hypertension     Leiomyoma     Parkinson's disease     Polymenorrhea     Prediabetes     Seasonal allergies      Past Surgical History:   Procedure Laterality Date    BUNIONECTOMY Left 2022    CATARACT EXTRACTION W/  INTRAOCULAR LENS IMPLANT Bilateral 2022, July     SECTION, LOW TRANSVERSE      x1    COLONOSCOPY N/A 2018    Procedure: COLONOSCOPY;  Surgeon: Carlos Oconnell III, MD;  Location: [facility];  Service: Endoscopy;  Laterality: N/A;    GANGLION CYST EXCISION      HERNIA REPAIR      Lysis of adhesions and enterotomy closure  2013    ROBOTIC ASSISTED HYSTERECTOMY  2013    With unilateral SO    SHOULDER ARTHROSCOPY W/ ROTATOR CUFF REPAIR Left 2017    Submaxillary gland drainage           Pre-op Assessment    I have reviewed the Patient Summary Reports.     I have reviewed the Nursing Notes.       Review of Systems  Anesthesia Hx:  No problems with previous Anesthesia  Denies Family Hx of Anesthesia complications.   Denies Personal Hx of Anesthesia complications.   Social:  Non-Smoker    Cardiovascular:   Exercise tolerance: good Hypertension Denies MI.   Denies Dysrhythmias.  hyperlipidemia ECG has been reviewed. Normal sinus rhythm   Low voltage QRS   Borderline Abnormal ECG   When compared with ECG of 10-NOV-2017 09:02,   No significant change was found   Confirmed by MIGUEL A LEE MD ([id number]) on 5/3/2022 9:02:28 PM    Renal/:  Renal/ Normal     Hepatic/GI:  Hepatic/GI Normal    Musculoskeletal:   Arthritis   Spine Disorders: Degenerative disease and Disc disease    OB/GYN/PEDS:  S/p hysterectomy   Neurological:   Parkinson's Movement Disorder Dx, Parkinson's Disease    Endocrine:  Endocrine Normal        Physical Exam  General: Well nourished, Cooperative, Alert and Oriented    Airway:  Mallampati: II   Mouth Opening: Normal  TM Distance: 4 - 6 cm  Tongue: Normal  Neck ROM: Normal ROM    Dental:    Chest/Lungs:  Clear to auscultation, Normal Respiratory Rate    Heart:  Rate: Normal  Rhythm: Regular Rhythm      Wt Readings from Last 3 Encounters:   02/13/23 63 kg (138 lb 14.2 oz)   09/02/22 68.8 kg (151 lb 10.8 oz)   08/26/22 67.5 kg (148 lb 13 oz)     Temp Readings from Last 3 Encounters:   02/13/23 36.1 °C (97 °F)   09/02/22 36.2 °C (97.1 °F) (Tympanic)   08/26/22 37.8 °C (100 °F)     BP Readings from Last 3 Encounters:   02/13/23 (!) 167/79   09/02/22 111/71   08/26/22 120/78     Pulse Readings from Last 3 Encounters:   02/13/23 69   09/02/22 99   08/26/22 73         Anesthesia Plan  Type of Anesthesia, risks & benefits discussed:    Anesthesia Type: Gen Natural Airway, MAC  Intra-op Monitoring Plan: Standard ASA Monitors  Post Op Pain Control Plan: multimodal analgesia  Induction:  IV  Informed Consent: Informed consent signed with the Patient and all parties understand the risks and agree with anesthesia plan.  All questions answered.   ASA Score: 3  Day of Surgery Review of History & Physical: H&P Update referred to the surgeon/provider.    Ready For Surgery From Anesthesia Perspective.     .

## 2023-02-13 ENCOUNTER — HOSPITAL ENCOUNTER (OUTPATIENT)
Facility: HOSPITAL | Age: 58
Discharge: HOME | End: 2023-02-13
Attending: INTERNAL MEDICINE | Admitting: INTERNAL MEDICINE
Payer: COMMERCIAL

## 2023-02-13 ENCOUNTER — ANESTHESIA (OUTPATIENT)
Dept: ENDOSCOPY | Facility: HOSPITAL | Age: 58
End: 2023-02-13
Payer: COMMERCIAL

## 2023-02-13 VITALS
OXYGEN SATURATION: 100 % | TEMPERATURE: 97 F | BODY MASS INDEX: 22.32 KG/M2 | WEIGHT: 138.88 LBS | HEIGHT: 66 IN | DIASTOLIC BLOOD PRESSURE: 78 MMHG | HEART RATE: 79 BPM | SYSTOLIC BLOOD PRESSURE: 151 MMHG | RESPIRATION RATE: 17 BRPM

## 2023-02-13 DIAGNOSIS — Z12.11 COLON CANCER SCREENING: Primary | ICD-10-CM

## 2023-02-13 PROCEDURE — 63600175 PHARM REV CODE 636 W HCPCS: Performed by: NURSE ANESTHETIST, CERTIFIED REGISTERED

## 2023-02-13 PROCEDURE — D9220A PRA ANESTHESIA: Mod: ,,, | Performed by: NURSE ANESTHETIST, CERTIFIED REGISTERED

## 2023-02-13 PROCEDURE — 37000009 HC ANESTHESIA EA ADD 15 MINS: Performed by: INTERNAL MEDICINE

## 2023-02-13 PROCEDURE — G0105 COLORECTAL SCRN; HI RISK IND: HCPCS | Mod: ,,, | Performed by: INTERNAL MEDICINE

## 2023-02-13 PROCEDURE — G0105 COLORECTAL SCRN; HI RISK IND: HCPCS | Performed by: INTERNAL MEDICINE

## 2023-02-13 PROCEDURE — G0105 COLORECTAL SCRN; HI RISK IND: ICD-10-PCS | Mod: ,,, | Performed by: INTERNAL MEDICINE

## 2023-02-13 PROCEDURE — 63600175 PHARM REV CODE 636 W HCPCS: Performed by: INTERNAL MEDICINE

## 2023-02-13 PROCEDURE — D9220A PRA ANESTHESIA: ICD-10-PCS | Mod: ,,, | Performed by: NURSE ANESTHETIST, CERTIFIED REGISTERED

## 2023-02-13 PROCEDURE — 37000008 HC ANESTHESIA 1ST 15 MINUTES: Performed by: INTERNAL MEDICINE

## 2023-02-13 RX ORDER — PROPOFOL 10 MG/ML
VIAL (ML) INTRAVENOUS
Status: DISCONTINUED | OUTPATIENT
Start: 2023-02-13 | End: 2023-02-13

## 2023-02-13 RX ORDER — LIDOCAINE HCL/PF 100 MG/5ML
SYRINGE (ML) INTRAVENOUS
Status: DISCONTINUED | OUTPATIENT
Start: 2023-02-13 | End: 2023-02-13

## 2023-02-13 RX ORDER — SODIUM CHLORIDE, SODIUM LACTATE, POTASSIUM CHLORIDE, CALCIUM CHLORIDE 600; 310; 30; 20 MG/100ML; MG/100ML; MG/100ML; MG/100ML
INJECTION, SOLUTION INTRAVENOUS CONTINUOUS
Status: DISCONTINUED | OUTPATIENT
Start: 2023-02-13 | End: 2023-02-13 | Stop reason: HOSPADM

## 2023-02-13 RX ADMIN — PROPOFOL 25 MG: 10 INJECTION, EMULSION INTRAVENOUS at 02:02

## 2023-02-13 RX ADMIN — Medication 50 MG: at 02:02

## 2023-02-13 RX ADMIN — SODIUM CHLORIDE, POTASSIUM CHLORIDE, SODIUM LACTATE AND CALCIUM CHLORIDE: 600; 310; 30; 20 INJECTION, SOLUTION INTRAVENOUS at 12:02

## 2023-02-13 RX ADMIN — PROPOFOL 50 MG: 10 INJECTION, EMULSION INTRAVENOUS at 02:02

## 2023-02-13 NOTE — ANESTHESIA POSTPROCEDURE EVALUATION
Anesthesia Post Evaluation    Patient: Rosalva Stauffer    Procedure(s) Performed: Procedure(s) (LRB):  COLONOSCOPY (N/A)    Final Anesthesia Type: general      Patient location during evaluation: GI PACU  Patient participation: Yes- Able to Participate  Level of consciousness: awake and alert  Post-procedure vital signs: reviewed and stable  Pain management: adequate  Airway patency: patent    PONV status at discharge: No PONV  Anesthetic complications: no      Cardiovascular status: hemodynamically stable  Respiratory status: unassisted and spontaneous ventilation  Hydration status: euvolemic  Follow-up not needed.          Vitals Value Taken Time   /78 02/13/23 1455   Temp 36.2 °C (97.2 °F) 02/13/23 1425   Pulse 79 02/13/23 1455   Resp 17 02/13/23 1455   SpO2 100 % 02/13/23 1455         Event Time   Out of Recovery 15:00:00         Pain/Hector Score: Hector Score: 10 (2/13/2023  2:55 PM)

## 2023-02-13 NOTE — H&P
Short Stay Endoscopy History and Physical    PCP - Lalita Patterson MD  Referring Physician - Lalita Patterson MD  [address] [place] [address]    Procedure - Colonoscopy  ASA - per anesthesia  Mallampati - per anesthesia  History of Anesthesia problems - no  Family history Anesthesia problems -  no   Plan of anesthesia - General    HPI  57 y.o. female  Reason for procedure: colon cancer screen          Personnel H/o colon polyps:no  FH of colon cancer:yes  Anticoagulation:no      ROS:  Constitutional: No fevers, chills, No weight loss  CV: No chest pain  Pulm: No cough, No shortness of breath  GI: see HPI    Medical History:  has a past medical history of DDD (degenerative disc disease), cervical, Hyperlipidemia, Hypertension, Leiomyoma, Parkinson's disease (), Polymenorrhea, Prediabetes, and Seasonal allergies.    Surgical History:  has a past surgical history that includes Hernia repair; Submaxillary gland drainage; Lysis of adhesions and enterotomy closure (2013); Ganglion cyst excision;  section, low transverse; Robotic assisted hysterectomy (2013); Colonoscopy (N/A, 2018); Shoulder arthroscopy w/ rotator cuff repair (Left, 2017); Bunionectomy (Left, 2022); and Cataract extraction w/  intraocular lens implant (Bilateral, ).    Family History: family history includes Cancer in her mother; Cataracts in her maternal grandmother; Colon cancer in her mother; Diabetes in her father; Hypertension in her brother, brother, father, mother, sister, sister, and sister; Thyroid disease in her sister..    Social History:  reports that she has never smoked. She has never used smokeless tobacco. She reports current alcohol use. She reports that she does not use drugs.    Review of patient's allergies indicates:   Allergen Reactions    Allegra-d 12 hour [fexofenadine-pseudoephedrine] Palpitations    Decongestant allergy Other (See Comments)    Mobic [meloxicam]       Urinary frequency       Medications:   Medications Prior to Admission   Medication Sig Dispense Refill Last Dose    carbidopa-levodopa  mg (SINEMET)  mg per tablet    2/13/2023    hydroCHLOROthiazide (MICROZIDE) 12.5 mg capsule Take 1 capsule (12.5 mg total) by mouth once daily. 90 capsule 3 2/13/2023    pramipexole (MIRAPEX) 1 MG tablet Take 1 mg by mouth nightly. Pt states she takes 1mg at night   2/13/2023    rasagiline (AZILECT) 1 mg Tab Take 1 mg by mouth once daily.   2/13/2023    azelastine (ASTELIN) 137 mcg (0.1 %) nasal spray 1 spray (137 mcg total) by Nasal route 2 (two) times daily. 30 mL 0     cyanocobalamin (VITAMIN B-12) 1000 MCG tablet Take 100 mcg by mouth once daily.       multivitamin capsule Take 1 capsule by mouth once daily.          Physical Exam:    Vital Signs:   Vitals:    02/13/23 1245   BP: (!) 167/79   Pulse: 69   Resp: 16   Temp: 97 °F (36.1 °C)       General Appearance: Well appearing in no acute distress  Abdomen: Soft, non tender, non distended with normal bowel sounds, no masses    Labs:  Lab Results   Component Value Date    WBC 4.90 09/07/2022    HGB 13.0 09/07/2022    HCT 41.5 09/07/2022     09/07/2022    CHOL 336 (H) 09/07/2022    TRIG 39 09/07/2022    HDL 96 (H) 09/07/2022    ALT 48 (H) 09/07/2022    AST 49 (H) 09/07/2022     09/07/2022    K 4.7 09/07/2022     09/07/2022    CREATININE 1.0 09/07/2022    BUN 14 09/07/2022    CO2 26 09/07/2022    TSH 0.784 09/07/2022    GLUF 94 08/13/2004    HGBA1C 6.2 (H) 09/07/2022       I have explained the risks and benefits of this endoscopic procedure to the patient including but not limited to bleeding, inflammation, infection, perforation, and death.    SEDATION PLAN: per anesthesia       History reviewed, vital signs satisfactory, cardiopulmonary status satisfactory, sedation options, risks and plans have been discussed with the patient  All their questions were answered and the patient agrees to the sedation  procedures as planned and the patient is deemed an appropriate candidate for the sedation as planned.     The risks, benefits and alternatives of the procedure were discussed with the patient in detail. This discussion was had in the presence of endoscopy staff. The risks include, risks of adverse reaction to sedation requiring the use of reversal agents, bleeding requiring blood transfusion, perforation requiring surgical intervention and technical failure. Other risks include aspiration leading to respiratory distress and respiratory failure resulting in endotracheal intubation and mechanical ventilation including death. If anesthesia is being utilized for this procedure, it is up to the anesthesiologist to determine airway safety including elective endotracheal intubation. Questions were answered, they agree to proceed. There was no language barriers.       Procedure explained to patient, informed consent obtained and placed in chart.       Konstantin Mccormick MD

## 2023-02-13 NOTE — DISCHARGE SUMMARY
[facility]  Discharge Note  Short Stay    Procedure(s) (LRB):  COLONOSCOPY (N/A)      OUTCOME: Patient tolerated treatment/procedure well without complication and is now ready for discharge.    DISPOSITION: Home or Self Care    FINAL DIAGNOSIS:  <principal problem not specified>    FOLLOWUP: With primary care provider    DISCHARGE INSTRUCTIONS:  No discharge procedures on file.     TIME SPENT ON DISCHARGE: 20   minutes

## 2023-02-13 NOTE — TRANSFER OF CARE
"Anesthesia Transfer of Care Note    Patient: Rosalva Stauffer    Procedure(s) Performed: Procedure(s) (LRB):  COLONOSCOPY (N/A)    Patient location: PACU    Anesthesia Type: general    Transport from OR: Transported from OR on room air with adequate spontaneous ventilation    Post pain: adequate analgesia    Post assessment: no apparent anesthetic complications and tolerated procedure well    Post vital signs: stable    Level of consciousness: awake    Nausea/Vomiting: no nausea/vomiting    Complications: none    Transfer of care protocol was followed      Last vitals:   Visit Vitals  BP (!) 167/79 (BP Location: Right arm, Patient Position: Sitting)   Pulse 69   Temp 36.1 °C (97 °F)   Resp 16   Ht 5' 6" (1.676 m)   Wt 63 kg (138 lb 14.2 oz)   LMP 05/28/2013   SpO2 100%   Breastfeeding No   BMI 22.42 kg/m²     "

## 2023-02-13 NOTE — [employer] PATIENT INSTRUCTIONS
Discharge Summary/Instructions after an Endoscopic Procedure  Patient Name: Rosalva Stauffer  Patient MRN: [id number]  Patient YOB: 1965 Monday, February 13, 2023  Konstantin Mccormick MD  Dear patient,  As a result of recent federal legislation (The Federal Cures Act), you may   receive lab or pathology results from your procedure in your [facility]   account before your physician is able to contact you. Your physician or   their representative will relay the results to you with their   recommendations at their soonest availability.  Thank you,  RESTRICTIONS:  During your procedure today, you received medications for sedation.  These   medications may affect your judgment, balance and coordination.  Therefore,   for 24 hours, you have the following restrictions:   - DO NOT drive a car, operate machinery, make legal/financial decisions,   sign important papers or drink alcohol.    ACTIVITY:  Today: no heavy lifting, straining or running due to procedural   sedation/anesthesia.  The following day: return to full activity including work.  DIET:  Eat and drink normally unless instructed otherwise.     TREATMENT FOR COMMON SIDE EFFECTS:  - Mild abdominal pain, nausea, belching, bloating or excessive gas:  rest,   eat lightly and use a heating pad.  - Sore Throat: treat with throat lozenges and/or gargle with warm salt   water.  - Because air was used during the procedure, expelling large amounts of air   from your rectum or belching is normal.  - If a bowel prep was taken, you may not have a bowel movement for 1-3 days.    This is normal.  SYMPTOMS TO WATCH FOR AND REPORT TO YOUR PHYSICIAN:  1. Abdominal pain or bloating, other than gas cramps.  2. Chest pain.  3. Back pain.  4. Signs of infection such as: chills or fever occurring within 24 hours   after the procedure.  5. Rectal bleeding, which would show as bright red, maroon, or black stools.   (A tablespoon of blood from the rectum is not serious,  especially if   hemorrhoids are present.)  6. Vomiting.  7. Weakness or dizziness.  GO DIRECTLY TO THE NEAREST EMERGENCY ROOM IF YOU HAVE ANY OF THE FOLLOWING:      Difficulty breathing              Chills and/or fever over 101 F   Persistent vomiting and/or vomiting blood   Severe abdominal pain   Severe chest pain   Black, tarry stools   Bleeding- more than one tablespoon   Any other symptom or condition that you feel may need urgent attention  Your doctor recommends these additional instructions:  If any biopsies were taken, your doctors clinic will contact you in 1 to 2   weeks with any results.  - Discharge patient to home (via wheelchair).   - Resume previous diet.   - Continue present medications.   - Repeat colonoscopy in 5 years for surveillance.   - Return to referring physician as previously scheduled.   - Patient has a contact number available for emergencies.  The signs and   symptoms of potential delayed complications were discussed with the   patient.  Return to normal activities tomorrow.  Written discharge   instructions were provided to the patient.  For questions, problems or results please call your physician Konstantin Mccormick MD at Work:  [phone]  If you have any questions about the above instructions, call the GI   department at [phone] or call the endoscopy unit at [phone]   from 7am until 3 pm.  [facility], EMERGENCY ROOM PHONE NUMBER:   [phone]  IF A COMPLICATION OR EMERGENCY SITUATION ARISES AND YOU ARE UNABLE TO REACH   YOUR PHYSICIAN - GO DIRECTLY TO THE EMERGENCY ROOM.  I have read or have had read to me these discharge instructions for my   procedure and have received a written copy.  I understand these   instructions and will follow-up with my physician if I have any questions.     __________________________________       _____________________________________  Nurse Signature                                           Patient/Designated   Responsible Party Signature  MD Konstantin Zarate MD  2/13/2023 2:24:25 PM  This report has been verified and signed electronically.  Dear patient,  As a result of recent federal legislation (The Federal Cures Act), you may   receive lab or pathology results from your procedure in your [facility]   account before your physician is able to contact you. Your physician or   their representative will relay the results to you with their   recommendations at their soonest availability.  Thank you,  [employer]

## 2023-02-13 NOTE — PLAN OF CARE
Discharge instructions reviewed with pt and family, handouts given, verbalized understanding with no further questions at this time. Dr. Mccormick spoke to pt at bedside, reviewed procedure and answered questions, MD telephone number provided per AVS sheet. No pain or nausea noted, tolerating po fluids without difficulty, no other complaints noted. Fall precautions reviewed, consents in chart, PIV to be removed at discharge.

## 2023-06-12 ENCOUNTER — OFFICE VISIT (OUTPATIENT)
Dept: FAMILY MEDICINE | Facility: CLINIC | Age: 58
End: 2023-06-12
Payer: COMMERCIAL

## 2023-06-12 ENCOUNTER — PATIENT MESSAGE (OUTPATIENT)
Dept: FAMILY MEDICINE | Facility: CLINIC | Age: 58
End: 2023-06-12

## 2023-06-12 VITALS
SYSTOLIC BLOOD PRESSURE: 154 MMHG | WEIGHT: 149.56 LBS | HEART RATE: 91 BPM | OXYGEN SATURATION: 97 % | BODY MASS INDEX: 24.04 KG/M2 | HEIGHT: 66 IN | DIASTOLIC BLOOD PRESSURE: 86 MMHG | TEMPERATURE: 97 F

## 2023-06-12 DIAGNOSIS — S76.312A STRAIN OF LEFT HAMSTRING, INITIAL ENCOUNTER: Primary | ICD-10-CM

## 2023-06-12 PROCEDURE — 99999 PR PBB SHADOW E&M-EST. PATIENT-LVL IV: CPT | Mod: PBBFAC,,, | Performed by: FAMILY MEDICINE

## 2023-06-12 PROCEDURE — 99213 PR OFFICE/OUTPT VISIT, EST, LEVL III, 20-29 MIN: ICD-10-PCS | Mod: 25,S$GLB,, | Performed by: FAMILY MEDICINE

## 2023-06-12 PROCEDURE — 96372 THER/PROPH/DIAG INJ SC/IM: CPT | Mod: S$GLB,,, | Performed by: FAMILY MEDICINE

## 2023-06-12 PROCEDURE — 99999 PR PBB SHADOW E&M-EST. PATIENT-LVL IV: ICD-10-PCS | Mod: PBBFAC,,, | Performed by: FAMILY MEDICINE

## 2023-06-12 PROCEDURE — 99213 OFFICE O/P EST LOW 20 MIN: CPT | Mod: 25,S$GLB,, | Performed by: FAMILY MEDICINE

## 2023-06-12 PROCEDURE — 96372 PR INJECTION,THERAP/PROPH/DIAG2ST, IM OR SUBCUT: ICD-10-PCS | Mod: S$GLB,,, | Performed by: FAMILY MEDICINE

## 2023-06-12 RX ORDER — IBUPROFEN 800 MG/1
800 TABLET ORAL 3 TIMES DAILY
Qty: 60 TABLET | Refills: 1 | Status: SHIPPED | OUTPATIENT
Start: 2023-06-12 | End: 2023-07-11

## 2023-06-12 RX ORDER — BETAMETHASONE SODIUM PHOSPHATE AND BETAMETHASONE ACETATE 3; 3 MG/ML; MG/ML
9 INJECTION, SUSPENSION INTRA-ARTICULAR; INTRALESIONAL; INTRAMUSCULAR; SOFT TISSUE
Status: COMPLETED | OUTPATIENT
Start: 2023-06-12 | End: 2023-06-12

## 2023-06-12 RX ADMIN — BETAMETHASONE SODIUM PHOSPHATE AND BETAMETHASONE ACETATE 9 MG: 3; 3 INJECTION, SUSPENSION INTRA-ARTICULAR; INTRALESIONAL; INTRAMUSCULAR; SOFT TISSUE at 02:06

## 2023-06-12 NOTE — PROGRESS NOTES
CHIEF COMPLAINT:  This is a 58-year-old female complaining of hamstring strain.     SUBJECTIVE:  Patient complains of a 1-1/2 month history of pain in left posterior thigh.  She reports that she pulled her hamstring when she turned the wrong way.  She is having difficulty with certain movements and ambulation.  She takes OTC analgesics intermittently.  has had problems with lower back for at least 1-1/2 years.  She denies numbness, tingling or weakness in limb.  She is currently in physical therapy for  another issue.  ROS:  GENERAL: Patient denies fever, chills, night sweats. Patient denies weight gain or loss. Patient denies anorexia, fatigue, weakness or swollen glands.  SKIN: Patient denies rash.  LUNGS: Patient denies cough, wheeze or hemoptysis.  CARDIOVASCULAR: Patient denies chest pain, shortness of breath, palpitations, syncope or lower extremity edema.  GI: Patient denies abdominal pain, nausea, vomiting, diarrhea, constipation, blood in stool or melena.  GENITOURINARY: Patient denies dysuria, frequency, hematuria, nocturia, urgency or incontinence.  MUSCULOSKELETAL: Patient denies joint pain, swelling, redness or warmth.  Positive for leg pain.  NEUROLOGIC: Patient denies headache, vertigo, paresthesias, weakness in limb, or abnormality of gait.     OBJECTIVE:   GENERAL: Well-developed well-nourished slightly overweight black female alert and oriented x3, in no acute distress. Memory, judgment and cognition without deficit.   SKIN: Clear without rash. Normal color and tone.   HEENT: Eyes: Clear conjunctivae. No scleral icterus.   NECK: Supple, normal range of motion.   LUNGS: Normal respiratory effort.   BACK: No CVA or spinal tenderness. Normal range of motion flexion and extension.  EXTREMITIES: Without cyanosis, clubbing or edema. Distal pulses 2+ and equal.  Decreased range of motion left lower extremity.  Tenderness along left posterior thigh. No joint effusion, erythema or warmth.   NEUROLOGIC:   Motor strength equal bilaterally. Sensation normal to touch. Deep tendon reflexes 2+ and equal. Gait without abnormality.     ASSESSMENT:  1. Strain of left hamstring, initial encounter      PLAN:  1. Celestone 9 mg IM.    2. Ibuprofen 800 mg 3 times daily with food.  3. Apply moist heat and/or ice q.i.d. for 15-20 minutes.  4. Physical therapy.    5. Follow-up if no improvement or worsening symptoms.    20 minutes of total time spent on the encounter, which includes face to face time and non-face to face time preparing to see the patient (eg, review of tests), Obtaining and/or reviewing separately obtained history, Documenting clinical information in the electronic or other health record, Independently interpreting results (not separately reported) and communicating results to the patient/family/caregiver, or Care coordination (not separately reported).     This note is generated with speech recognition software and is subject to transcription error and sound alike phrases that may be missed by proofreading.

## 2023-06-15 ENCOUNTER — PATIENT MESSAGE (OUTPATIENT)
Dept: FAMILY MEDICINE | Facility: CLINIC | Age: 58
End: 2023-06-15
Payer: COMMERCIAL

## 2023-07-03 ENCOUNTER — LAB VISIT (OUTPATIENT)
Dept: LAB | Facility: HOSPITAL | Age: 58
End: 2023-07-03
Attending: FAMILY MEDICINE
Payer: COMMERCIAL

## 2023-07-03 ENCOUNTER — OFFICE VISIT (OUTPATIENT)
Dept: FAMILY MEDICINE | Facility: CLINIC | Age: 58
End: 2023-07-03
Payer: COMMERCIAL

## 2023-07-03 VITALS
HEIGHT: 66 IN | HEART RATE: 81 BPM | BODY MASS INDEX: 24.1 KG/M2 | WEIGHT: 149.94 LBS | OXYGEN SATURATION: 97 % | TEMPERATURE: 96 F | DIASTOLIC BLOOD PRESSURE: 68 MMHG | SYSTOLIC BLOOD PRESSURE: 120 MMHG

## 2023-07-03 DIAGNOSIS — R73.03 PREDIABETES: ICD-10-CM

## 2023-07-03 DIAGNOSIS — I10 ESSENTIAL HYPERTENSION: ICD-10-CM

## 2023-07-03 DIAGNOSIS — G20.A1 PARKINSON'S DISEASE: ICD-10-CM

## 2023-07-03 DIAGNOSIS — Z00.00 PREVENTATIVE HEALTH CARE: ICD-10-CM

## 2023-07-03 DIAGNOSIS — Z00.00 PREVENTATIVE HEALTH CARE: Primary | ICD-10-CM

## 2023-07-03 LAB
ALBUMIN SERPL BCP-MCNC: 3.8 G/DL (ref 3.5–5.2)
ALP SERPL-CCNC: 74 U/L (ref 55–135)
ALT SERPL W/O P-5'-P-CCNC: 16 U/L (ref 10–44)
ANION GAP SERPL CALC-SCNC: 13 MMOL/L (ref 8–16)
AST SERPL-CCNC: 38 U/L (ref 10–40)
BASOPHILS # BLD AUTO: 0.02 K/UL (ref 0–0.2)
BASOPHILS NFR BLD: 0.5 % (ref 0–1.9)
BILIRUB SERPL-MCNC: 0.3 MG/DL (ref 0.1–1)
BUN SERPL-MCNC: 17 MG/DL (ref 6–20)
CALCIUM SERPL-MCNC: 9.3 MG/DL (ref 8.7–10.5)
CHLORIDE SERPL-SCNC: 104 MMOL/L (ref 95–110)
CHOLEST SERPL-MCNC: 276 MG/DL (ref 120–199)
CHOLEST/HDLC SERPL: 3.5 {RATIO} (ref 2–5)
CO2 SERPL-SCNC: 23 MMOL/L (ref 23–29)
CREAT SERPL-MCNC: 0.9 MG/DL (ref 0.5–1.4)
DIFFERENTIAL METHOD: ABNORMAL
EOSINOPHIL # BLD AUTO: 0.1 K/UL (ref 0–0.5)
EOSINOPHIL NFR BLD: 2.5 % (ref 0–8)
ERYTHROCYTE [DISTWIDTH] IN BLOOD BY AUTOMATED COUNT: 13.1 % (ref 11.5–14.5)
EST. GFR  (NO RACE VARIABLE): >60 ML/MIN/1.73 M^2
ESTIMATED AVG GLUCOSE: 126 MG/DL (ref 68–131)
GLUCOSE SERPL-MCNC: 94 MG/DL (ref 70–110)
HBA1C MFR BLD: 6 % (ref 4–5.6)
HBV SURFACE AG SERPL QL IA: NORMAL
HCT VFR BLD AUTO: 37.8 % (ref 37–48.5)
HCV AB SERPL QL IA: NORMAL
HDLC SERPL-MCNC: 79 MG/DL (ref 40–75)
HDLC SERPL: 28.6 % (ref 20–50)
HGB BLD-MCNC: 12.1 G/DL (ref 12–16)
HIV 1+2 AB+HIV1 P24 AG SERPL QL IA: NORMAL
IMM GRANULOCYTES # BLD AUTO: 0.01 K/UL (ref 0–0.04)
IMM GRANULOCYTES NFR BLD AUTO: 0.2 % (ref 0–0.5)
LDLC SERPL CALC-MCNC: 182 MG/DL (ref 63–159)
LYMPHOCYTES # BLD AUTO: 1.3 K/UL (ref 1–4.8)
LYMPHOCYTES NFR BLD: 30 % (ref 18–48)
MCH RBC QN AUTO: 29.4 PG (ref 27–31)
MCHC RBC AUTO-ENTMCNC: 32 G/DL (ref 32–36)
MCV RBC AUTO: 92 FL (ref 82–98)
MONOCYTES # BLD AUTO: 0.2 K/UL (ref 0.3–1)
MONOCYTES NFR BLD: 5.5 % (ref 4–15)
NEUTROPHILS # BLD AUTO: 2.7 K/UL (ref 1.8–7.7)
NEUTROPHILS NFR BLD: 61.3 % (ref 38–73)
NONHDLC SERPL-MCNC: 197 MG/DL
NRBC BLD-RTO: 0 /100 WBC
PLATELET # BLD AUTO: 243 K/UL (ref 150–450)
PMV BLD AUTO: 11.8 FL (ref 9.2–12.9)
POTASSIUM SERPL-SCNC: 4 MMOL/L (ref 3.5–5.1)
PROT SERPL-MCNC: 7.2 G/DL (ref 6–8.4)
RBC # BLD AUTO: 4.12 M/UL (ref 4–5.4)
SODIUM SERPL-SCNC: 140 MMOL/L (ref 136–145)
TRIGL SERPL-MCNC: 75 MG/DL (ref 30–150)
TSH SERPL DL<=0.005 MIU/L-ACNC: 0.75 UIU/ML (ref 0.4–4)
WBC # BLD AUTO: 4.37 K/UL (ref 3.9–12.7)

## 2023-07-03 PROCEDURE — 87389 HIV-1 AG W/HIV-1&-2 AB AG IA: CPT | Performed by: FAMILY MEDICINE

## 2023-07-03 PROCEDURE — 99396 PR PREVENTIVE VISIT,EST,40-64: ICD-10-PCS | Mod: S$GLB,,, | Performed by: FAMILY MEDICINE

## 2023-07-03 PROCEDURE — 36415 COLL VENOUS BLD VENIPUNCTURE: CPT | Mod: PO | Performed by: FAMILY MEDICINE

## 2023-07-03 PROCEDURE — 86803 HEPATITIS C AB TEST: CPT | Performed by: FAMILY MEDICINE

## 2023-07-03 PROCEDURE — 80053 COMPREHEN METABOLIC PANEL: CPT | Performed by: FAMILY MEDICINE

## 2023-07-03 PROCEDURE — 99999 PR PBB SHADOW E&M-EST. PATIENT-LVL IV: ICD-10-PCS | Mod: PBBFAC,,, | Performed by: FAMILY MEDICINE

## 2023-07-03 PROCEDURE — 80061 LIPID PANEL: CPT | Performed by: FAMILY MEDICINE

## 2023-07-03 PROCEDURE — 83036 HEMOGLOBIN GLYCOSYLATED A1C: CPT | Performed by: FAMILY MEDICINE

## 2023-07-03 PROCEDURE — 86592 SYPHILIS TEST NON-TREP QUAL: CPT | Performed by: FAMILY MEDICINE

## 2023-07-03 PROCEDURE — 84443 ASSAY THYROID STIM HORMONE: CPT | Performed by: FAMILY MEDICINE

## 2023-07-03 PROCEDURE — 99999 PR PBB SHADOW E&M-EST. PATIENT-LVL IV: CPT | Mod: PBBFAC,,, | Performed by: FAMILY MEDICINE

## 2023-07-03 PROCEDURE — 85025 COMPLETE CBC W/AUTO DIFF WBC: CPT | Performed by: FAMILY MEDICINE

## 2023-07-03 PROCEDURE — 99396 PREV VISIT EST AGE 40-64: CPT | Mod: S$GLB,,, | Performed by: FAMILY MEDICINE

## 2023-07-03 PROCEDURE — 87340 HEPATITIS B SURFACE AG IA: CPT | Performed by: FAMILY MEDICINE

## 2023-07-03 NOTE — PROGRESS NOTES
CHIEF COMPLAINT:  This is a 58-year-old female here for preventive health exam.     SUBJECTIVE:  Patient is doing well without complaints except for persistent hamstring pain. She had bunionectomy left foot in May 2022. She had radiation of scar to prevent keloiding. The patient is taking Sinemet and Azilect for Parkinson's disease per her neurologist.  She has a tremor and was told that she had mild disease. She takes Mirapex for restless leg syndrome.  Her hypertension is controlled on HCTZ.  She has dyslipidemia and prediabetes.  She is not exercising regularly.  She had sleep study which indicated that she had narcolepsy which she rejects.     Eye exam 2023. Mammogram January 2023. Colonoscopy February 2023 due February 2028. Tdap September 2011. Flu vaccine October 2022. COVID 19 vaccine March, April, October 2021, October 2022. Shingrix series completed.  Pneumovax October 2020.     ROS:  GENERAL: Patient denies fever, chills, night sweats. Patient denies weight gain or loss. Patient denies anorexia, fatigue, weakness or swollen glands.  SKIN: Patient denies rash or hair loss.  HEENT: Patient denies sore throat, ear pain, hearing loss, nasal congestion, or runny nose. Patient denies visual disturbance, eye irritation or discharge.  LUNGS: Patient denies cough, wheeze or hemoptysis.  CARDIOVASCULAR: Patient denies chest pain, shortness of breath, palpitations, syncope or lower extremity edema.  GI: Patient denies abdominal pain, nausea, vomiting, diarrhea, constipation, blood in stool or melena.  GENITOURINARY: Patient denies pelvic pain, vaginal discharge, itch or odor. Patient denies irregular vaginal bleeding. Patient denies dysuria, frequency, hematuria, nocturia, urgency or incontinence.  BREASTS: Patient denies breast pain, mass or nipple discharge.  MUSCULOSKELETAL: Patient denies joint pain, swelling, redness or warmth.  NEUROLOGIC: Patient denies headache, vertigo, paresthesias, weakness in limb,  dysarthria, dysphagia or abnormality of gait.  PSYCHIATRIC: Patient denies anxiety, depression, or memory loss.     OBJECTIVE:   GENERAL: Well-developed well-nourished black female alert and oriented x3, in no acute distress. Memory, judgment and cognition without deficit.   SKIN: Clear without rash. Normal color and tone.   HEENT: Eyes: Clear conjunctivae. No scleral icterus. Pupils equal reactive to light and accommodation. Ears: Clear TMs. Clear canals. Nose: Without congestion. Pharynx: Without injection or exudates.   NECK: Supple, normal range of motion. No masses, lymphadenopathy or enlarged thyroid. No JVD. Carotids 2+ and equal. No bruits.   LUNGS: Clear to auscultation. Normal respiratory effort.   CARDIOVASCULAR: Regular rhythm, normal S1, S2 without murmur, gallop or rub.   BACK: No CVA or spinal tenderness.   BREASTS: No masses, tenderness or nipple discharge.   ABDOMEN: Normal appearance. Active bowel sounds. Soft, nontender without mass or organomegaly. No rebound or guarding. Well-healed incisional scars.   EXTREMITIES: Without cyanosis, clubbing or edema. Distal pulses 2+ and equal. Normal range of motion in all extremities. No joint effusion, erythema or warmth.   NEUROLOGIC: Cranial nerves II through XII without deficit. Motor strength equal bilaterally. Sensation normal to touch. Deep tendon reflexes 2+ and equal. Gait without abnormality. No tremor. Negative cerebellar signs.   PELVIC:  Deferred to 2024.     ASSESSMENT:  1. Preventative health care    2. Essential hypertension    3. Parkinson's disease    4. Prediabetes      PLAN:  1. Exercise regularly.  2. Age-appropriate counseling.  3. Fasting lab.  4. Refill medications as needed.  5. Follow-up annually.    This note is generated with speech recognition software and is subject to transcription error and sound alike phrases that may be missed by proofreading.

## 2023-07-05 ENCOUNTER — PATIENT MESSAGE (OUTPATIENT)
Dept: FAMILY MEDICINE | Facility: CLINIC | Age: 58
End: 2023-07-05
Payer: COMMERCIAL

## 2023-07-05 LAB — RPR SER QL: NORMAL

## 2023-07-10 ENCOUNTER — PATIENT MESSAGE (OUTPATIENT)
Dept: FAMILY MEDICINE | Facility: CLINIC | Age: 58
End: 2023-07-10
Payer: COMMERCIAL

## 2023-07-10 DIAGNOSIS — E78.5 HYPERLIPIDEMIA, UNSPECIFIED HYPERLIPIDEMIA TYPE: Primary | ICD-10-CM

## 2023-07-10 RX ORDER — ATORVASTATIN CALCIUM 20 MG/1
20 TABLET, FILM COATED ORAL DAILY
Qty: 90 TABLET | Refills: 3 | Status: SHIPPED | OUTPATIENT
Start: 2023-07-10

## 2023-07-11 ENCOUNTER — OFFICE VISIT (OUTPATIENT)
Dept: FAMILY MEDICINE | Facility: CLINIC | Age: 58
End: 2023-07-11
Payer: COMMERCIAL

## 2023-07-11 VITALS
DIASTOLIC BLOOD PRESSURE: 82 MMHG | WEIGHT: 152.31 LBS | SYSTOLIC BLOOD PRESSURE: 146 MMHG | TEMPERATURE: 99 F | HEART RATE: 85 BPM | HEIGHT: 66 IN | RESPIRATION RATE: 18 BRPM | BODY MASS INDEX: 24.48 KG/M2 | OXYGEN SATURATION: 97 %

## 2023-07-11 DIAGNOSIS — M25.512 ACUTE PAIN OF LEFT SHOULDER: Primary | ICD-10-CM

## 2023-07-11 DIAGNOSIS — M62.89 MUSCLE TIGHTNESS: ICD-10-CM

## 2023-07-11 PROCEDURE — 99999 PR PBB SHADOW E&M-EST. PATIENT-LVL V: CPT | Mod: PBBFAC,,, | Performed by: NURSE PRACTITIONER

## 2023-07-11 PROCEDURE — 96372 PR INJECTION,THERAP/PROPH/DIAG2ST, IM OR SUBCUT: ICD-10-PCS | Mod: S$GLB,,, | Performed by: NURSE PRACTITIONER

## 2023-07-11 PROCEDURE — 96372 THER/PROPH/DIAG INJ SC/IM: CPT | Mod: S$GLB,,, | Performed by: NURSE PRACTITIONER

## 2023-07-11 PROCEDURE — 99213 OFFICE O/P EST LOW 20 MIN: CPT | Mod: 25,S$GLB,, | Performed by: NURSE PRACTITIONER

## 2023-07-11 PROCEDURE — 99213 PR OFFICE/OUTPT VISIT, EST, LEVL III, 20-29 MIN: ICD-10-PCS | Mod: 25,S$GLB,, | Performed by: NURSE PRACTITIONER

## 2023-07-11 PROCEDURE — 99999 PR PBB SHADOW E&M-EST. PATIENT-LVL V: ICD-10-PCS | Mod: PBBFAC,,, | Performed by: NURSE PRACTITIONER

## 2023-07-11 RX ORDER — CYCLOBENZAPRINE HCL 10 MG
10 TABLET ORAL NIGHTLY
Qty: 5 TABLET | Refills: 0 | Status: SHIPPED | OUTPATIENT
Start: 2023-07-11 | End: 2023-07-21

## 2023-07-11 RX ORDER — KETOROLAC TROMETHAMINE 30 MG/ML
60 INJECTION, SOLUTION INTRAMUSCULAR; INTRAVENOUS ONCE
Status: COMPLETED | OUTPATIENT
Start: 2023-07-11 | End: 2023-07-11

## 2023-07-11 RX ADMIN — KETOROLAC TROMETHAMINE 60 MG: 30 INJECTION, SOLUTION INTRAMUSCULAR; INTRAVENOUS at 03:07

## 2023-07-11 NOTE — PROGRESS NOTES
Rosalva Stauffer  2023  [id number]    Lalita Patterson MD  Patient Care Team:  Lalita Patterson MD as PCP - General (Family Medicine)  Angel Myers MD as Consulting Physician (Otolaryngology)  Cristian Howard II, MD as Consulting Physician (Neurology)  Farooq Browne LPN as Care Coordinator (Internal Medicine)  Christi Medina MD (Physical Medicine and Rehabilitation)          Visit Type:an urgent visit for a new problem    Chief Complaint:  Chief Complaint   Patient presents with    Shoulder Pain     left       History of Present Illness:  58-year-old female presents today with a complaint of left shoulder and muscle pain for 2 days.  Patient reports that she is been lifting heavy boxes while at work.  Denies known injury or trauma.  Patient reports she took over-the-counter Ibp 200 pain relief but reports some muscles are still really tight.  Patient states she has full range of motion but with pain      History:  Past Medical History:   Diagnosis Date    DDD (degenerative disc disease), cervical     Hyperlipidemia     Hypertension     Leiomyoma     Parkinson's disease     Polymenorrhea     Prediabetes     Seasonal allergies      Past Surgical History:   Procedure Laterality Date    BUNIONECTOMY Left 2022    CATARACT EXTRACTION W/  INTRAOCULAR LENS IMPLANT Bilateral 2022, July     SECTION, LOW TRANSVERSE      x1    COLONOSCOPY N/A 2018    Procedure: COLONOSCOPY;  Surgeon: Carlos Oconnell III, MD;  Location: [facility];  Service: Endoscopy;  Laterality: N/A;    COLONOSCOPY N/A 2023    Procedure: COLONOSCOPY;  Surgeon: Konstantin Mccormick MD;  Location: [facility] ENDO;  Service: Endoscopy;  Laterality: N/A;    GANGLION CYST EXCISION      HERNIA REPAIR      Lysis of adhesions and enterotomy closure  2013    ROBOTIC ASSISTED HYSTERECTOMY  2013    With unilateral SO    SHOULDER ARTHROSCOPY W/ ROTATOR CUFF REPAIR Left 2017    Submaxillary gland drainage        Family History   Problem Relation Age of Onset    Hypertension Mother     Cancer Mother     Colon cancer Mother     Diabetes Father     Hypertension Father     Hypertension Sister     Thyroid disease Sister     Hypertension Brother     Hypertension Sister     Hypertension Sister     Hypertension Brother     Cataracts Maternal Grandmother      Social History     Socioeconomic History    Marital status:    Occupational History     Employer: [employer]   Tobacco Use    Smoking status: Never    Smokeless tobacco: Never   Substance and Sexual Activity    Alcohol use: Yes     Comment: seldomly    Drug use: No    Sexual activity: Not Currently   Social History Narrative    The patient is  and has one child. She has remarried her female partner. 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                Social Determinants of Health     Financial Resource Strain: Low Risk     Difficulty of Paying Living Expenses: Not hard at all   Food Insecurity: No Food Insecurity    Worried About Running Out of Food in the Last Year: Never true    Ran Out of Food in the Last Year: Never true   Transportation Needs: No Transportation Needs    Lack of Transportation (Medical): No    Lack of Transportation (Non-Medical): No   Physical Activity: Insufficiently Active    Days of Exercise per Week: 3 days    Minutes of Exercise per Session: 30 min   Stress: No Stress Concern Present    Feeling of Stress : Not at all   Social Connections: Unknown    Frequency of Communication with Friends and Family: More than three times a week    Frequency of Social Gatherings with Friends and Family: Once a week    Active Member of Clubs or Organizations: No    Attends Club or Organization Meetings: Never    Marital Status: Living with partner   Housing Stability: Low Risk     Unable to Pay for Housing in the Last Year: No    Number of Places Lived in the Last Year: 1    Unstable Housing in the Last Year: No     Patient  Active Problem List   Diagnosis    Essential hypertension    Hyperlipidemia    Seasonal allergies    Parkinson's disease    Prediabetes    Colon cancer screening     Review of patient's allergies indicates:   Allergen Reactions    Allegra-d 12 hour [fexofenadine-pseudoephedrine] Palpitations    Decongestant allergy Other (See Comments)    Mobic [meloxicam]      Urinary frequency       The following were reviewed at this visit: active problem list, medication list, allergies, family history, social history, and health maintenance.    Medications:  Current Outpatient Medications on File Prior to Visit   Medication Sig Dispense Refill    atorvastatin (LIPITOR) 20 MG tablet Take 1 tablet (20 mg total) by mouth once daily. 90 tablet 3    cyanocobalamin (VITAMIN B-12) 1000 MCG tablet Take 100 mcg by mouth once daily.      hydroCHLOROthiazide (MICROZIDE) 12.5 mg capsule Take 1 capsule (12.5 mg total) by mouth once daily. 90 capsule 3    multivitamin capsule Take 1 capsule by mouth once daily.      pramipexole (MIRAPEX) 1 MG tablet Take 1 mg by mouth nightly. Pt states she takes 1mg at night      rasagiline (AZILECT) 1 mg Tab Take 1 mg by mouth once daily.      [DISCONTINUED] ibuprofen (ADVIL,MOTRIN) 800 MG tablet Take 1 tablet (800 mg total) by mouth 3 (three) times daily. 60 tablet 1    azelastine (ASTELIN) 137 mcg (0.1 %) nasal spray 1 spray (137 mcg total) by Nasal route 2 (two) times daily. 30 mL 0    carbidopa-levodopa  mg (SINEMET)  mg per tablet        No current facility-administered medications on file prior to visit.       Medications have been reviewed and reconciled with patient at this visit.  Barriers to medications reviewed with patient.    Adverse reactions to current medications reviewed with patient..    Over the counter medications reviewed and reconciled with patient.    Exam:  Wt Readings from Last 3 Encounters:   07/11/23 69.1 kg (152 lb 5.4 oz)   07/03/23 68 kg (149 lb 14.6 oz)   06/12/23  67.8 kg (149 lb 9.3 oz)     Temp Readings from Last 3 Encounters:   07/11/23 98.9 °F (37.2 °C) (Oral)   07/03/23 (!) 95.5 °F (35.3 °C)   06/12/23 97.3 °F (36.3 °C)     BP Readings from Last 3 Encounters:   07/11/23 (!) 146/82   07/03/23 120/68   06/12/23 (!) 154/86     Pulse Readings from Last 3 Encounters:   07/11/23 85   07/03/23 81   06/12/23 91     Body mass index is 24.59 kg/m².      Review of Systems   Constitutional:  Negative for fever.   Respiratory:  Negative for cough, shortness of breath and wheezing.    Cardiovascular:  Negative for chest pain and palpitations.   Gastrointestinal:  Negative for nausea.   Musculoskeletal:  Positive for myalgias.   Neurological:  Negative for speech change, weakness and headaches.   All other systems reviewed and are negative.  Physical Exam  Vitals and nursing note reviewed.   Constitutional:       Appearance: Normal appearance. She is obese.   HENT:      Head: Normocephalic and atraumatic.      Right Ear: Tympanic membrane, ear canal and external ear normal.      Left Ear: Tympanic membrane, ear canal and external ear normal.      Nose: Nose normal.      Mouth/Throat:      Mouth: Mucous membranes are moist.      Pharynx: Oropharynx is clear.   Eyes:      Extraocular Movements: Extraocular movements intact.      Conjunctiva/sclera: Conjunctivae normal.      Pupils: Pupils are equal, round, and reactive to light.   Cardiovascular:      Rate and Rhythm: Normal rate and regular rhythm.      Pulses: Normal pulses.      Heart sounds: Normal heart sounds.   Pulmonary:      Effort: Pulmonary effort is normal.      Breath sounds: Normal breath sounds.   Abdominal:      General: Bowel sounds are normal.      Palpations: Abdomen is soft.   Musculoskeletal:         General: Normal range of motion.        Arms:       Cervical back: Normal range of motion and neck supple.   Skin:     General: Skin is warm and dry.      Capillary Refill: Capillary refill takes less than 2 seconds.    Neurological:      General: No focal deficit present.      Mental Status: She is alert and oriented to person, place, and time.   Psychiatric:         Mood and Affect: Mood normal.         Behavior: Behavior normal.         Thought Content: Thought content normal.         Judgment: Judgment normal.       Laboratory Reviewed ({Yes)  Lab Results   Component Value Date    WBC 4.37 07/03/2023    HGB 12.1 07/03/2023    HCT 37.8 07/03/2023     07/03/2023    CHOL 276 (H) 07/03/2023    TRIG 75 07/03/2023    HDL 79 (H) 07/03/2023    ALT 16 07/03/2023    AST 38 07/03/2023     07/03/2023    K 4.0 07/03/2023     07/03/2023    CREATININE 0.9 07/03/2023    BUN 17 07/03/2023    CO2 23 07/03/2023    TSH 0.747 07/03/2023    GLUF 94 08/13/2004    HGBA1C 6.0 (H) 07/03/2023       Rosalva was seen today for shoulder pain.    Diagnoses and all orders for this visit:    Acute pain of left shoulder  -     Ambulatory referral/consult to Physical/Occupational Therapy; Future  -     ketorolac injection 60 mg  -     cyclobenzaprine (FLEXERIL) 10 MG tablet; Take 1 tablet (10 mg total) by mouth every evening. for 10 days    Muscle tightness      Follow up with PT  Follow up with massage therapy    Care Plan/Goals: Reviewed    Goals    None       Rosalva was seen today for shoulder pain.    Diagnoses and all orders for this visit:    Acute pain of left shoulder  -     Ambulatory referral/consult to Physical/Occupational Therapy; Future  -     ketorolac injection 60 mg  -     cyclobenzaprine (FLEXERIL) 10 MG tablet; Take 1 tablet (10 mg total) by mouth every evening. for 10 days    Muscle tightness       Follow up: Follow up for with PT for evaluation and treatment.    After visit summary was printed and given to patient upon discharge today.  Patient goals and care plan are included in After Visit Summary.

## 2023-07-12 ENCOUNTER — PATIENT MESSAGE (OUTPATIENT)
Dept: FAMILY MEDICINE | Facility: CLINIC | Age: 58
End: 2023-07-12
Payer: COMMERCIAL

## 2023-07-23 PROBLEM — Z12.11 COLON CANCER SCREENING: Status: RESOLVED | Noted: 2023-02-13 | Resolved: 2023-07-23

## 2023-07-23 NOTE — PROGRESS NOTES
"SUBJECTIVE:     Rosalva Stauffer is a 58 y.o. female is here today for:  LEFT SHOULDER PAIN    HPI:    Shoulder Pain  Patient complains of left shoulder pain.  The symptoms began early July 2023.  Seen by Aviva in office on 7/11/23, referred to PT.  Has started PT at [facility] on [address], twice a week.  Given Toradol IM in office, was able to give her some temp relief of pain.  Taking rx for Flexeril and does help.  Pain did get sig better and just about resolved until this past Friday 7/21/23 when she lifted some heavy boxes.  Pain started later that night, did not have pain immediately.  Current pain 5/10 on pain scale.  Pain worse with "anything I do", not able to sleep on left side.  Sleeps on her back.  Tx'ng this recurrent issue with Motrin 800 and Flexeril.      REVIEW OF SYSTEMS:  Review of Systems   Constitutional: Negative.    HENT:  Negative for hearing loss.    Eyes:  Negative for discharge.   Respiratory:  Negative for wheezing.    Cardiovascular:  Negative for chest pain and palpitations.   Gastrointestinal:  Negative for blood in stool, constipation, diarrhea and vomiting.   Genitourinary:  Negative for dysuria and hematuria.   Musculoskeletal:  Positive for joint pain (LT shoulder). Negative for neck pain.   Neurological:  Negative for dizziness, tingling, tremors, loss of consciousness, weakness and headaches.   Endo/Heme/Allergies:  Negative for polydipsia.   All other systems reviewed and are negative.      CURRENT ALLERGIES:    Review of patient's allergies indicates:   Allergen Reactions    Allegra-d 12 hour [fexofenadine-pseudoephedrine] Palpitations    Decongestant allergy Other (See Comments)    Mobic [meloxicam]      Urinary frequency       CURRENT PROBLEM LIST:    Patient Active Problem List   Diagnosis    Essential hypertension    Hyperlipidemia    Seasonal allergies    Parkinson's disease    Prediabetes       CURRENT MEDICATION LIST:      Current Outpatient Medications:     " "atorvastatin (LIPITOR) 20 MG tablet, Take 1 tablet (20 mg total) by mouth once daily., Disp: 90 tablet, Rfl: 3    azelastine (ASTELIN) 137 mcg (0.1 %) nasal spray, 1 spray (137 mcg total) by Nasal route 2 (two) times daily., Disp: 30 mL, Rfl: 0    carbidopa-levodopa  mg (SINEMET)  mg per tablet, , Disp: , Rfl:     cyanocobalamin (VITAMIN B-12) 1000 MCG tablet, Take 100 mcg by mouth once daily., Disp: , Rfl:     hydroCHLOROthiazide (MICROZIDE) 12.5 mg capsule, Take 1 capsule (12.5 mg total) by mouth once daily., Disp: 90 capsule, Rfl: 3    multivitamin capsule, Take 1 capsule by mouth once daily., Disp: , Rfl:     pramipexole (MIRAPEX) 1 MG tablet, Take 1 mg by mouth nightly. Pt states she takes 1mg at night, Disp: , Rfl:     rasagiline (AZILECT) 1 mg Tab, Take 1 mg by mouth once daily., Disp: , Rfl:       HISTORY:    Past medical, surgical, family and social histories have been reviewed today.      OBJECTIVE:     Vitals:    07/24/23 0847   BP: 138/78   Pulse: 92   Temp: 96.4 °F (35.8 °C)   SpO2: 99%   Weight: 70.1 kg (154 lb 10.4 oz)   Height: 5' 6" (1.676 m)   PainSc: 5       Physical Exam  Vitals reviewed.   HENT:      Head: Normocephalic and atraumatic.   Musculoskeletal:      Left shoulder: Tenderness present. No swelling, deformity or crepitus. Normal range of motion. Normal strength. Normal pulse.        Arms:       Comments: Neg drop-arm and [place] test.   Neurological:      Mental Status: She is alert and oriented to person, place, and time.      Sensory: No sensory deficit.      Motor: No weakness.      Coordination: Coordination normal.      Gait: Gait normal.   Psychiatric:         Mood and Affect: Mood normal.         Behavior: Behavior normal.         Thought Content: Thought content normal.         Judgment: Judgment normal.       ASSESSMENT:     1. Shoulder strain, left, initial encounter  predniSONE (DELTASONE) 20 MG tablet    X-Ray Shoulder 2 or More Views Left  Pain relief and comfort " measures discussed.  Continue Motrin 800 mg prn w/ food, Flexeril prn (sedating).  Continue PT for shoulder.          PLAN:     Pending XR.  RTC as directed and/or prn.        VIETT Chauhan  [facility]       20 minutes of total time spent on the encounter, which includes face to face time and non-face to face time preparing to see the patient.  This includes obtaining and/or reviewing separately obtained history, performing a medically appropriate examination and/or evaluation, and counseling and educating the patient/family/caregiver.  Includes documenting clinical information in the electronic or other health record, independently interpreting results (not separately reported) and communicating results to the patient/family/caregiver, with care coordination (not separately reported).  Medications, tests and/or procedures ordered as necessary along with referring and communicating with other health professionals (when not separately reported).

## 2023-07-24 ENCOUNTER — HOSPITAL ENCOUNTER (OUTPATIENT)
Dept: RADIOLOGY | Facility: HOSPITAL | Age: 58
Discharge: HOME | End: 2023-07-24
Attending: REGISTERED NURSE
Payer: COMMERCIAL

## 2023-07-24 ENCOUNTER — OFFICE VISIT (OUTPATIENT)
Dept: FAMILY MEDICINE | Facility: CLINIC | Age: 58
End: 2023-07-24
Payer: COMMERCIAL

## 2023-07-24 VITALS
HEART RATE: 92 BPM | SYSTOLIC BLOOD PRESSURE: 138 MMHG | OXYGEN SATURATION: 99 % | TEMPERATURE: 96 F | HEIGHT: 66 IN | DIASTOLIC BLOOD PRESSURE: 78 MMHG | WEIGHT: 154.63 LBS | BODY MASS INDEX: 24.85 KG/M2

## 2023-07-24 DIAGNOSIS — S46.912A SHOULDER STRAIN, LEFT, INITIAL ENCOUNTER: Primary | ICD-10-CM

## 2023-07-24 DIAGNOSIS — S46.912A SHOULDER STRAIN, LEFT, INITIAL ENCOUNTER: ICD-10-CM

## 2023-07-24 PROCEDURE — 73030 X-RAY EXAM OF SHOULDER: CPT | Mod: TC,FY,PO,LT

## 2023-07-24 PROCEDURE — 99213 OFFICE O/P EST LOW 20 MIN: CPT | Mod: S$GLB,,, | Performed by: REGISTERED NURSE

## 2023-07-24 PROCEDURE — 73030 X-RAY EXAM OF SHOULDER: CPT | Mod: 26,LT,, | Performed by: RADIOLOGY

## 2023-07-24 PROCEDURE — 99213 PR OFFICE/OUTPT VISIT, EST, LEVL III, 20-29 MIN: ICD-10-PCS | Mod: S$GLB,,, | Performed by: REGISTERED NURSE

## 2023-07-24 PROCEDURE — 73030 XR SHOULDER COMPLETE 2 OR MORE VIEWS LEFT: ICD-10-PCS | Mod: 26,LT,, | Performed by: RADIOLOGY

## 2023-07-24 PROCEDURE — 99999 PR PBB SHADOW E&M-EST. PATIENT-LVL III: CPT | Mod: PBBFAC,,, | Performed by: REGISTERED NURSE

## 2023-07-24 PROCEDURE — 99999 PR PBB SHADOW E&M-EST. PATIENT-LVL III: ICD-10-PCS | Mod: PBBFAC,,, | Performed by: REGISTERED NURSE

## 2023-07-24 RX ORDER — PREDNISONE 20 MG/1
TABLET ORAL
Qty: 22 TABLET | Refills: 0 | Status: SHIPPED | OUTPATIENT
Start: 2023-07-24 | End: 2023-08-15

## 2023-07-24 RX ORDER — CYCLOBENZAPRINE HCL 5 MG
5 TABLET ORAL
COMMUNITY
Start: 2023-07-17

## 2023-07-24 RX ORDER — METHOCARBAMOL 750 MG/1
750 TABLET, FILM COATED ORAL 2 TIMES DAILY PRN
COMMUNITY
Start: 2023-07-12

## 2023-08-15 ENCOUNTER — OFFICE VISIT (OUTPATIENT)
Dept: FAMILY MEDICINE | Facility: CLINIC | Age: 58
End: 2023-08-15
Payer: COMMERCIAL

## 2023-08-15 VITALS
TEMPERATURE: 97 F | SYSTOLIC BLOOD PRESSURE: 118 MMHG | HEART RATE: 94 BPM | WEIGHT: 154.75 LBS | HEIGHT: 66 IN | DIASTOLIC BLOOD PRESSURE: 74 MMHG | OXYGEN SATURATION: 98 % | BODY MASS INDEX: 24.87 KG/M2

## 2023-08-15 DIAGNOSIS — R61 DIAPHORESIS: Primary | ICD-10-CM

## 2023-08-15 DIAGNOSIS — Z76.0 MEDICATION REFILL: ICD-10-CM

## 2023-08-15 PROCEDURE — 99213 OFFICE O/P EST LOW 20 MIN: CPT | Mod: S$GLB,,, | Performed by: REGISTERED NURSE

## 2023-08-15 PROCEDURE — 99999 PR PBB SHADOW E&M-EST. PATIENT-LVL III: CPT | Mod: PBBFAC,,, | Performed by: REGISTERED NURSE

## 2023-08-15 PROCEDURE — 99999 PR PBB SHADOW E&M-EST. PATIENT-LVL III: ICD-10-PCS | Mod: PBBFAC,,, | Performed by: REGISTERED NURSE

## 2023-08-15 PROCEDURE — 99213 PR OFFICE/OUTPT VISIT, EST, LEVL III, 20-29 MIN: ICD-10-PCS | Mod: S$GLB,,, | Performed by: REGISTERED NURSE

## 2023-08-15 RX ORDER — AZELASTINE 1 MG/ML
1 SPRAY, METERED NASAL 2 TIMES DAILY
Qty: 30 ML | Refills: 0 | Status: SHIPPED | OUTPATIENT
Start: 2023-08-15

## 2023-08-15 RX ORDER — IBUPROFEN 800 MG/1
TABLET ORAL
COMMUNITY

## 2023-08-15 RX ORDER — CARBIDOPA AND LEVODOPA 25; 100 MG/1; MG/1
1 TABLET ORAL 4 TIMES DAILY
COMMUNITY
End: 2023-08-15 | Stop reason: SDUPTHER

## 2023-08-15 RX ORDER — AZELASTINE 1 MG/ML
1 SPRAY, METERED NASAL 2 TIMES DAILY
COMMUNITY
End: 2023-08-15 | Stop reason: SDUPTHER

## 2023-08-15 NOTE — PROGRESS NOTES
SUBJECTIVE:     Rosalva Stauffer is a 58 y.o. female is here today for:  Hot Flashes    HPI:    Patient is here today with c/o hot flashes x few weeks.  History of hysterectomy, still has one ovary.  Usually occurs when moving around at work.  No physical illnesses, fever or chills.  Did take prednisone as ordered after LOV 7/24/23 x 10 days.  Did complete med as ordered, no hot flashes today.      REVIEW OF SYSTEMS:    Review of Systems   Constitutional:  Positive for diaphoresis. Negative for chills, fever and malaise/fatigue.   Eyes: Negative.    Respiratory: Negative.     Cardiovascular: Negative.    Neurological:  Positive for dizziness. Negative for tingling, tremors and headaches.   All other systems reviewed and are negative.        CURRENT ALLERGIES:    Review of patient's allergies indicates:   Allergen Reactions    Allegra-d 12 hour [fexofenadine-pseudoephedrine] Palpitations    Decongestant allergy Other (See Comments)    Mobic [meloxicam]      Urinary frequency       CURRENT PROBLEM LIST:    Patient Active Problem List   Diagnosis    Essential hypertension    Hyperlipidemia    Seasonal allergies    Parkinson's disease    Prediabetes       CURRENT MEDICATION LIST:      Current Outpatient Medications:     atorvastatin (LIPITOR) 20 MG tablet, Take 1 tablet (20 mg total) by mouth once daily., Disp: 90 tablet, Rfl: 3    azelastine (ASTELIN) 137 mcg (0.1 %) nasal spray, 1 spray (137 mcg total) by Nasal route 2 (two) times daily., Disp: 30 mL, Rfl: 0    carbidopa-levodopa  mg (SINEMET)  mg per tablet, , Disp: , Rfl:     cyanocobalamin (VITAMIN B-12) 1000 MCG tablet, Take 100 mcg by mouth once daily., Disp: , Rfl:     cyclobenzaprine (FLEXERIL) 5 MG tablet, Take 5 mg by mouth., Disp: , Rfl:     hydroCHLOROthiazide (MICROZIDE) 12.5 mg capsule, Take 1 capsule (12.5 mg total) by mouth once daily., Disp: 90 capsule, Rfl: 3    methocarbamoL (ROBAXIN) 750 MG Tab, Take 750 mg by mouth 2 (two) times  "daily as needed., Disp: , Rfl:     multivitamin capsule, Take 1 capsule by mouth once daily., Disp: , Rfl:     pramipexole (MIRAPEX) 1 MG tablet, Take 1 mg by mouth nightly. Pt states she takes 1mg at night, Disp: , Rfl:     predniSONE (DELTASONE) 20 MG tablet, Take 3 tab by mouth x 5 days, 2 tab PO x 2 days, 1 tab PO x 2 days, then 1/2 tab on last day, Disp: 22 tablet, Rfl: 0    rasagiline (AZILECT) 1 mg Tab, Take 1 mg by mouth once daily., Disp: , Rfl:       HISTORY:    Past medical, surgical, family and social histories have been reviewed today.      OBJECTIVE:     Vitals:    08/15/23 1335   BP: 118/74   Pulse: 94   Temp: 97.4 °F (36.3 °C)   SpO2: 98%   Weight: 70.2 kg (154 lb 12.2 oz)   Height: 5' 6" (1.676 m)   PainSc: 0-No pain       Physical Exam  Vitals reviewed.   Constitutional:       General: She is not in acute distress.  Eyes:      Pupils: Pupils are equal, round, and reactive to light.   Cardiovascular:      Rate and Rhythm: Normal rate and regular rhythm.      Pulses: Normal pulses.      Heart sounds: Normal heart sounds.   Pulmonary:      Effort: Pulmonary effort is normal.      Breath sounds: Normal breath sounds.   Musculoskeletal:         General: Normal range of motion.      Cervical back: Normal range of motion and neck supple. No rigidity.      Right lower leg: No edema.      Left lower leg: No edema.   Skin:     Capillary Refill: Capillary refill takes less than 2 seconds.   Neurological:      Mental Status: She is alert and oriented to person, place, and time.      Motor: No weakness.      Gait: Gait normal.   Psychiatric:         Mood and Affect: Mood normal.         Behavior: Behavior normal.         Thought Content: Thought content normal.         Judgment: Judgment normal.         ASSESSMENT:     1. Diaphoresis  Likely due to oral prednisone.  Has completed, today is 1st day w/out sweating.  Monitor.    2. Medication refill  -     azelastine (ASTELIN) 137 mcg (0.1 %) nasal spray; 1 spray " (137 mcg total) by Nasal route 2 (two) times daily.  Dispense: 30 mL; Refill: 0    PLAN:     RTC as directed and/or prn.        IVETT Chauhan  [facility]       20 minutes of total time spent on the encounter, which includes face to face time and non-face to face time preparing to see the patient.  This includes obtaining and/or reviewing separately obtained history, performing a medically appropriate examination and/or evaluation, and counseling and educating the patient/family/caregiver.  Includes documenting clinical information in the electronic or other health record, independently interpreting results (not separately reported) and communicating results to the patient/family/caregiver, with care coordination (not separately reported).  Medications, tests and/or procedures ordered as necessary along with referring and communicating with other health professionals (when not separately reported).

## 2023-08-28 ENCOUNTER — PATIENT MESSAGE (OUTPATIENT)
Dept: FAMILY MEDICINE | Facility: CLINIC | Age: 58
End: 2023-08-28
Payer: COMMERCIAL

## 2023-09-24 NOTE — TELEPHONE ENCOUNTER
No care due was identified.  [facility] Embedded Care Due Messages. Reference number: [id number].   9/24/2023 7:01:46 AM CDT

## 2023-09-25 ENCOUNTER — TELEPHONE (OUTPATIENT)
Dept: FAMILY MEDICINE | Facility: CLINIC | Age: 58
End: 2023-09-25
Payer: COMMERCIAL

## 2023-09-25 RX ORDER — HYDROCHLOROTHIAZIDE 12.5 MG/1
12.5 CAPSULE ORAL
Qty: 90 CAPSULE | Refills: 3 | OUTPATIENT
Start: 2023-09-25

## 2023-09-25 RX ORDER — HYDROCHLOROTHIAZIDE 12.5 MG/1
12.5 CAPSULE ORAL DAILY
Qty: 90 CAPSULE | Refills: 2 | Status: SHIPPED | OUTPATIENT
Start: 2023-09-25 | End: 2023-11-18

## 2023-09-25 RX ORDER — HYDROCHLOROTHIAZIDE 12.5 MG/1
12.5 CAPSULE ORAL DAILY
Qty: 90 CAPSULE | Refills: 0 | Status: SHIPPED | OUTPATIENT
Start: 2023-09-25 | End: 2023-09-25 | Stop reason: SDUPTHER

## 2023-09-25 NOTE — TELEPHONE ENCOUNTER
Refill Decision Note   Rosalva Stauffer  is requesting a refill authorization.  Brief Assessment and Rationale for Refill:  Quick Discontinue     Medication Therapy Plan:         Comments:     Note composed:9:08 AM 09/25/2023

## 2023-09-25 NOTE — TELEPHONE ENCOUNTER
No care due was identified.  [facility] Embedded Care Due Messages. Reference number: [id number].   9/24/2023 10:00:56 PM CDT

## 2023-09-25 NOTE — TELEPHONE ENCOUNTER
----- Message from Kianna Holman sent at 9/25/2023  9:44 AM CDT -----  Type:  RX Refill Request    Who Called: pt  Refill or New Rx:refill  RX Name and Strength:hydroCHLOROthiazide  How is the patient currently taking it? (ex. 1XDay):1XDay  Is this a 30 day or 90 day RX:90  Preferred Pharmacy with phone number:.    [id number] - [place] [address] [facility] & [address] [address]  Phone: [phone] Fax: [phone]    Local or Mail Order:local  Ordering Provider:Dr Patterson  Would the patient rather a call back or a response via [facility]? call  Best Call Back Number:[phone]  Additional Information: .    Thank you

## 2023-11-13 ENCOUNTER — PATIENT MESSAGE (OUTPATIENT)
Dept: ADMINISTRATIVE | Facility: HOSPITAL | Age: 58
End: 2023-11-13
Payer: COMMERCIAL

## 2023-11-15 ENCOUNTER — PATIENT OUTREACH (OUTPATIENT)
Dept: ADMINISTRATIVE | Facility: HOSPITAL | Age: 58
End: 2023-11-15
Payer: COMMERCIAL

## 2023-11-15 DIAGNOSIS — Z12.31 ENCOUNTER FOR SCREENING MAMMOGRAM FOR MALIGNANT NEOPLASM OF BREAST: Primary | ICD-10-CM

## 2023-11-18 RX ORDER — HYDROCHLOROTHIAZIDE 12.5 MG/1
12.5 CAPSULE ORAL DAILY
Qty: 90 CAPSULE | Refills: 2 | Status: SHIPPED | OUTPATIENT
Start: 2023-11-18

## 2023-11-18 NOTE — TELEPHONE ENCOUNTER
No care due was identified.  [facility] Embedded Care Due Messages. Reference number: [id number].   11/18/2023 5:20:16 PM CST

## 2023-11-18 NOTE — TELEPHONE ENCOUNTER
No care due was identified.  [facility] Embedded Care Due Messages. Reference number: [id number].   11/18/2023 5:46:44 AM CST

## 2023-11-18 NOTE — TELEPHONE ENCOUNTER
Refill Decision Note   Rosalva Stauffer  is requesting a refill authorization.  Brief Assessment and Rationale for Refill:  Approve     Medication Therapy Plan:         Comments:     Note composed:3:51 PM 11/18/2023

## 2023-11-19 RX ORDER — HYDROCHLOROTHIAZIDE 12.5 MG/1
12.5 CAPSULE ORAL
Qty: 90 CAPSULE | Refills: 2 | OUTPATIENT
Start: 2023-11-19

## 2023-11-19 NOTE — TELEPHONE ENCOUNTER
Refill Decision Note   Rosalva Stauffer  is requesting a refill authorization.  Brief Assessment and Rationale for Refill:  Quick Discontinue     Medication Therapy Plan:  Receipt confirmed by pharmacy (11/18/2023  3:51 PM CST)      Comments:     Note composed:3:10 AM 11/19/2023

## 2023-12-19 ENCOUNTER — TELEPHONE (OUTPATIENT)
Dept: FAMILY MEDICINE | Facility: CLINIC | Age: 58
End: 2023-12-19
Payer: COMMERCIAL

## 2023-12-19 NOTE — TELEPHONE ENCOUNTER
Patient called regarding her BP. Patient has an appointment on 12/20/2023. Pt will start keeping a log of her BP in the morning and at night.

## 2023-12-19 NOTE — TELEPHONE ENCOUNTER
----- Message from Jerilyn Moya sent at 12/19/2023  9:21 AM CST -----  .Type:  Needs Medical Advice    Who Called: pt    Would the patient rather a call back or a response via [facility]? Call back  Best Call Back Number: [phone]  Additional Information:     Pt would like a call back about getting an appointment to check her blood pressure

## 2024-02-02 ENCOUNTER — HOSPITAL ENCOUNTER (OUTPATIENT)
Dept: RADIOLOGY | Facility: HOSPITAL | Age: 59
Discharge: HOME | End: 2024-02-02
Attending: FAMILY MEDICINE
Payer: COMMERCIAL

## 2024-02-02 VITALS — BODY MASS INDEX: 24.8 KG/M2 | HEIGHT: 66 IN | WEIGHT: 154.31 LBS

## 2024-02-02 DIAGNOSIS — Z12.31 ENCOUNTER FOR SCREENING MAMMOGRAM FOR MALIGNANT NEOPLASM OF BREAST: ICD-10-CM

## 2024-02-02 PROCEDURE — 77063 BREAST TOMOSYNTHESIS BI: CPT | Mod: 26,,, | Performed by: RADIOLOGY

## 2024-02-02 PROCEDURE — 77067 SCR MAMMO BI INCL CAD: CPT | Mod: 26,,, | Performed by: RADIOLOGY

## 2024-02-02 PROCEDURE — 77067 SCR MAMMO BI INCL CAD: CPT | Mod: TC

## 2024-02-28 NOTE — TELEPHONE ENCOUNTER
----- Message from Roseanne Montemayor MA sent at 6/6/2022  8:41 AM CDT -----  Contact: Rosalva  Yes it was faxed off do you need it?  ----- Message -----  From: Akilah Holman MA  Sent: 6/6/2022   8:41 AM CDT  To: Roseanne Montemayor MA    Do you have her eye sx clearance form?    ----- Message -----  From: Savanah Salgado  Sent: 6/6/2022   8:33 AM CDT  To: Jason Bautista Jr Staff    Rosalva needs a call back at [phone], Regards to getting her paper work fax over she stated that office already has the fax number on where to send it to.    Thanks  Td           rolling walker

## 2024-03-13 ENCOUNTER — OFFICE VISIT (OUTPATIENT)
Dept: PODIATRY | Facility: CLINIC | Age: 59
End: 2024-03-13
Payer: COMMERCIAL

## 2024-03-13 VITALS — WEIGHT: 154.31 LBS | BODY MASS INDEX: 24.8 KG/M2 | HEIGHT: 66 IN

## 2024-03-13 DIAGNOSIS — M54.16 LUMBAR RADICULOPATHY: ICD-10-CM

## 2024-03-13 DIAGNOSIS — G57.92 NEUROPATHY OF LEFT FOOT: Primary | ICD-10-CM

## 2024-03-13 PROCEDURE — 99999 PR PBB SHADOW E&M-EST. PATIENT-LVL III: CPT | Mod: PBBFAC,,, | Performed by: PODIATRIST

## 2024-03-13 PROCEDURE — 99213 OFFICE O/P EST LOW 20 MIN: CPT | Mod: S$GLB,,, | Performed by: PODIATRIST

## 2024-03-13 NOTE — PROGRESS NOTES
Subjective:     Patient ID: Rosalva Stauffer is a 58 y.o. female.    Chief Complaint: Numbness (C/o states numbness in left foot, wearing tennis shoes, non-diabetic pt, last seen PCP Dr. Patterson 8/15/23. )    Rosalva is a 58 y.o. female who presents to the podiatry clinic  with complaint of  left foot numbness. Patient denies pain but wanted foot checked. Patient does admit history of lower back issues. Patient points to ball of left foot. Patient has no other pedal complaint today.     Patient Active Problem List   Diagnosis    Essential hypertension    Hyperlipidemia    Seasonal allergies    Parkinson's disease    Prediabetes       Medication List with Changes/Refills   Current Medications    ATORVASTATIN (LIPITOR) 20 MG TABLET    Take 1 tablet (20 mg total) by mouth once daily.    AZELASTINE (ASTELIN) 137 MCG (0.1 %) NASAL SPRAY    1 spray (137 mcg total) by Nasal route 2 (two) times daily.    CARBIDOPA-LEVODOPA  MG (SINEMET)  MG PER TABLET        CYANOCOBALAMIN (VITAMIN B-12) 1000 MCG TABLET    Take 100 mcg by mouth once daily.    CYCLOBENZAPRINE (FLEXERIL) 5 MG TABLET    Take 5 mg by mouth.    HYDROCHLOROTHIAZIDE (MICROZIDE) 12.5 MG CAPSULE    Take 1 capsule (12.5 mg total) by mouth once daily.    IBUPROFEN (ADVIL,MOTRIN) 800 MG TABLET        METHOCARBAMOL (ROBAXIN) 750 MG TAB    Take 750 mg by mouth 2 (two) times daily as needed.    MULTIVITAMIN CAPSULE    Take 1 capsule by mouth once daily.    PRAMIPEXOLE (MIRAPEX) 1 MG TABLET    Take 1 mg by mouth nightly. Pt states she takes 1mg at night    RASAGILINE (AZILECT) 1 MG TAB    Take 1 mg by mouth once daily.       Review of patient's allergies indicates:   Allergen Reactions    Allegra-d 12 hour [fexofenadine-pseudoephedrine] Palpitations    Decongestant allergy Other (See Comments)    Mobic [meloxicam]      Urinary frequency       Past Surgical History:   Procedure Laterality Date    BUNIONECTOMY Left 05/2022    CATARACT EXTRACTION W/  INTRAOCULAR LENS  IMPLANT Bilateral      SECTION, LOW TRANSVERSE      x1    COLONOSCOPY N/A 2018    Procedure: COLONOSCOPY;  Surgeon: Carlos Oconnell III, MD;  Location: [facility] ENDO;  Service: Endoscopy;  Laterality: N/A;    COLONOSCOPY N/A 2023    Procedure: COLONOSCOPY;  Surgeon: Konstantin Mccormick MD;  Location: [facility] ENDO;  Service: Endoscopy;  Laterality: N/A;    GANGLION CYST EXCISION      HERNIA REPAIR      HYSTERECTOMY  2013    Lysis of adhesions and enterotomy closure  2013    ROBOTIC ASSISTED HYSTERECTOMY  2013    With unilateral SO    SHOULDER ARTHROSCOPY W/ ROTATOR CUFF REPAIR Left 2017    SMALL INTESTINE SURGERY  2013    Submaxillary gland drainage         Family History   Problem Relation Age of Onset    Hypertension Mother     Cancer Mother     Colon cancer Mother     Diabetes Father     Hypertension Father     Hypertension Sister     Thyroid disease Sister     Hypertension Brother     Hypertension Sister     Hypertension Sister     Hypertension Brother     Cataracts Maternal Grandmother        Social History     Socioeconomic History    Marital status: Single   Occupational History     Employer: [employer]   Tobacco Use    Smoking status: Never    Smokeless tobacco: Never   Substance and Sexual Activity    Alcohol use: Yes     Comment: seldomly    Drug use: No    Sexual activity: Yes     Partners: Female     Birth control/protection: None   Social History Narrative    The patient is  and has one child. She has remarried her female partner. 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                Social Determinants of Health     Financial Resource Strain: Low Risk  (2023)    Overall Financial Resource Strain (CARDIA)     Difficulty of Paying Living Expenses: Not hard at all   Food Insecurity: No Food Insecurity (2023)    Hunger Vital Sign     Worried About Running Out of Food in the Last Year: Never true     Ran Out of  "Food in the Last Year: Never true   Transportation Needs: No Transportation Needs (6/30/2023)    PRAPARE - Transportation     Lack of Transportation (Medical): No     Lack of Transportation (Non-Medical): No   Physical Activity: Insufficiently Active (6/30/2023)    Exercise Vital Sign     Days of Exercise per Week: 3 days     Minutes of Exercise per Session: 30 min   Stress: No Stress Concern Present (6/30/2023)    [employer] - Occupational Stress Questionnaire     Feeling of Stress : Not at all   Social Connections: Unknown (6/30/2023)    Social Connection and Isolation Panel [NHANES]     Frequency of Communication with Friends and Family: More than three times a week     Frequency of Social Gatherings with Friends and Family: Once a week     Active Member of Clubs or Organizations: No     Attends Club or Organization Meetings: Never     Marital Status: Living with partner   Housing Stability: Low Risk  (6/30/2023)    Housing Stability Vital Sign     Unable to Pay for Housing in the Last Year: No     Number of Places Lived in the Last Year: 1     Unstable Housing in the Last Year: No       Vitals:    03/13/24 1428   Weight: 70 kg (154 lb 5.2 oz)   Height: 5' 6" (1.676 m)       Hemoglobin A1C   Date Value Ref Range Status   07/03/2023 6.0 (H) 4.0 - 5.6 % Final     Comment:     ADA Screening Guidelines:  5.7-6.4%  Consistent with prediabetes  >or=6.5%  Consistent with diabetes    High levels of fetal hemoglobin interfere with the HbA1C  assay. Heterozygous hemoglobin variants (HbS, HgC, etc)do  not significantly interfere with this assay.   However, presence of multiple variants may affect accuracy.     09/07/2022 6.2 (H) 4.0 - 5.6 % Final     Comment:     ADA Screening Guidelines:  5.7-6.4%  Consistent with prediabetes  >or=6.5%  Consistent with diabetes    High levels of fetal hemoglobin interfere with the HbA1C  assay. Heterozygous hemoglobin variants (HbS, HgC, etc)do  not " significantly interfere with this assay.   However, presence of multiple variants may affect accuracy.     02/24/2022 6.0 (H) 4.0 - 5.6 % Final     Comment:     ADA Screening Guidelines:  5.7-6.4%  Consistent with prediabetes  >or=6.5%  Consistent with diabetes    High levels of fetal hemoglobin interfere with the HbA1C  assay. Heterozygous hemoglobin variants (HbS, HgC, etc)do  not significantly interfere with this assay.   However, presence of multiple variants may affect accuracy.         Review of Systems   Constitutional:  Negative for chills and fever.   Respiratory:  Negative for shortness of breath.    Cardiovascular:  Negative for chest pain, palpitations, orthopnea, claudication and leg swelling.   Gastrointestinal:  Negative for diarrhea, nausea and vomiting.   Musculoskeletal:  Negative for joint pain.   Skin:  Negative for rash.   Neurological:  Tingling: numbess.   Psychiatric/Behavioral: Negative.             Objective:      PHYSICAL EXAM: Apperance: Alert and orient in no distress,well developed, and with good attention to grooming and body habits  Patient presents ambulating in tennis shoes.   LOWER EXTREMITY EXAM:  VASCULAR: Dorsalis pedis pulses 2/4 bilateral and Posterior Tibial pulses 2/4 bilateral.   DERMATOLOGICAL: No skin rashes, subcutaneous nodules, lesions, or ulcers observed bilateral.   NEUROLOGICAL: Light touch, sharp-dull, proprioception all present and equal bilaterally.  Vibratory sensation intact at bilateral hallux.  Protective sensation intact at all 10 sites as tested with a Gladstone-Vaibhav 5.07 monofilament.   MUSCULOSKELETAL: Muscle strength is 5/5 for foot inverters, everters, plantarflexors, and dorsiflexors. Muscle tone is normal.         Assessment:       ICD-10-CM ICD-9-CM   1. Neuropathy of left foot  G57.92 355.8   2. Lumbar radiculopathy  M54.16 724.4       Plan:   Neuropathy of left foot    Lumbar radiculopathy      I counseled the patient on her conditions, regarding  findings of my examination, my impressions, and usual treatment plan.   Discussed with patient treatments for neuropathy consisting of topical or oral medication.  Recommendations given for over-the-counter medicine such as [place] Go[place] and/or Capsaicin.  Counseled patient on daily foot inspections and proper shoe gear.  Patient to return in 12 months or sooner if needed.          Dayday Barnes DPM  [facility]

## 2024-04-24 ENCOUNTER — OFFICE VISIT (OUTPATIENT)
Dept: FAMILY MEDICINE | Facility: CLINIC | Age: 59
End: 2024-04-24
Payer: COMMERCIAL

## 2024-04-24 VITALS
DIASTOLIC BLOOD PRESSURE: 80 MMHG | BODY MASS INDEX: 24.91 KG/M2 | SYSTOLIC BLOOD PRESSURE: 130 MMHG | WEIGHT: 154.31 LBS | TEMPERATURE: 98 F | RESPIRATION RATE: 18 BRPM | HEART RATE: 90 BPM

## 2024-04-24 DIAGNOSIS — Z76.0 MEDICATION REFILL: ICD-10-CM

## 2024-04-24 DIAGNOSIS — J01.10 ACUTE NON-RECURRENT FRONTAL SINUSITIS: Primary | ICD-10-CM

## 2024-04-24 PROCEDURE — 99213 OFFICE O/P EST LOW 20 MIN: CPT | Mod: 25,S$GLB,, | Performed by: INTERNAL MEDICINE

## 2024-04-24 PROCEDURE — 96372 THER/PROPH/DIAG INJ SC/IM: CPT | Mod: S$GLB,,, | Performed by: INTERNAL MEDICINE

## 2024-04-24 PROCEDURE — 99999 PR PBB SHADOW E&M-EST. PATIENT-LVL III: CPT | Mod: PBBFAC,,, | Performed by: INTERNAL MEDICINE

## 2024-04-24 RX ORDER — AZELASTINE 1 MG/ML
1 SPRAY, METERED NASAL 2 TIMES DAILY
Qty: 30 ML | Refills: 0 | Status: SHIPPED | OUTPATIENT
Start: 2024-04-24

## 2024-04-24 RX ORDER — DEXAMETHASONE SODIUM PHOSPHATE 100 MG/10ML
10 INJECTION INTRAMUSCULAR; INTRAVENOUS
Status: COMPLETED | OUTPATIENT
Start: 2024-04-24 | End: 2024-04-24

## 2024-04-24 RX ORDER — AMOXICILLIN AND CLAVULANATE POTASSIUM 875; 125 MG/1; MG/1
1 TABLET, FILM COATED ORAL EVERY 12 HOURS
Qty: 10 TABLET | Refills: 0 | Status: SHIPPED | OUTPATIENT
Start: 2024-04-24 | End: 2024-04-29

## 2024-04-24 RX ADMIN — DEXAMETHASONE SODIUM PHOSPHATE 10 MG: 100 INJECTION INTRAMUSCULAR; INTRAVENOUS at 04:04

## 2024-04-24 NOTE — PROGRESS NOTES
Subjective     Patient ID: Rosalva Stauffer is a 59 y.o. female.    Chief Complaint: Cough and Sinusitis      HPI  Patient presents due to sinusitis. She has been having green sinus drainage for 1 week. She went to  last week and was prescribed Alahist D with no relief.    Review of Systems   Constitutional:  Negative for chills and fatigue.   HENT:  Positive for postnasal drip.    Respiratory:  Negative for chest tightness and shortness of breath.    Cardiovascular:  Negative for chest pain and palpitations.   Gastrointestinal:  Negative for abdominal pain, constipation, diarrhea, nausea and vomiting.   Genitourinary:  Negative for frequency and urgency.   Neurological:  Negative for dizziness, numbness and headaches.          Objective       Current Outpatient Medications:     atorvastatin (LIPITOR) 20 MG tablet, Take 1 tablet (20 mg total) by mouth once daily., Disp: 90 tablet, Rfl: 3    carbidopa-levodopa  mg (SINEMET)  mg per tablet, , Disp: , Rfl:     cyclobenzaprine (FLEXERIL) 5 MG tablet, Take 5 mg by mouth., Disp: , Rfl:     hydroCHLOROthiazide (MICROZIDE) 12.5 mg capsule, Take 1 capsule (12.5 mg total) by mouth once daily., Disp: 90 capsule, Rfl: 2    ibuprofen (ADVIL,MOTRIN) 800 MG tablet, , Disp: , Rfl:     methocarbamoL (ROBAXIN) 750 MG Tab, Take 750 mg by mouth 2 (two) times daily as needed., Disp: , Rfl:     multivitamin capsule, Take 1 capsule by mouth once daily., Disp: , Rfl:     pramipexole (MIRAPEX) 1 MG tablet, Take 1 mg by mouth nightly. Pt states she takes 1mg at night, Disp: , Rfl:     rasagiline (AZILECT) 1 mg Tab, Take 1 mg by mouth once daily., Disp: , Rfl:     amoxicillin-clavulanate 875-125mg (AUGMENTIN) 875-125 mg per tablet, Take 1 tablet by mouth every 12 (twelve) hours. for 5 days, Disp: 10 tablet, Rfl: 0    azelastine (ASTELIN) 137 mcg (0.1 %) nasal spray, 1 spray (137 mcg total) by Nasal route 2 (two) times daily., Disp: 30 mL, Rfl: 0    Current Facility-Administered  Medications:     dexAMETHasone injection 10 mg, 10 mg, Intramuscular, 1 time in Clinic/HOD,      Physical Exam  Constitutional:       General: She is not in acute distress.     Appearance: Normal appearance. She is normal weight.   HENT:      Head: Normocephalic and atraumatic.   Cardiovascular:      Rate and Rhythm: Normal rate and regular rhythm.      Heart sounds: Normal heart sounds. No murmur heard.     No friction rub. No gallop.   Pulmonary:      Effort: Pulmonary effort is normal.      Breath sounds: Normal breath sounds. No wheezing, rhonchi or rales.   Abdominal:      General: Bowel sounds are normal. There is no distension.      Palpations: Abdomen is soft.      Tenderness: There is no abdominal tenderness. There is no rebound.   Skin:     General: Skin is warm and dry.      Coloration: Skin is not jaundiced.   Neurological:      General: No focal deficit present.      Mental Status: She is alert and oriented to person, place, and time. Mental status is at baseline.   Psychiatric:         Mood and Affect: Mood normal.         Behavior: Behavior normal.            Assessment and Plan     1. Acute non-recurrent frontal sinusitis  -     dexAMETHasone injection 10 mg  -     amoxicillin-clavulanate 875-125mg (AUGMENTIN) 875-125 mg per tablet; Take 1 tablet by mouth every 12 (twelve) hours. for 5 days  Dispense: 10 tablet; Refill: 0    2. Medication refill  -     azelastine (ASTELIN) 137 mcg (0.1 %) nasal spray; 1 spray (137 mcg total) by Nasal route 2 (two) times daily.  Dispense: 30 mL; Refill: 0      Will give injection of dexamethasone. Will refill Azelastine. Will prescribe Augmentin. Advised patient to also take Allegra or Zyrtec and Flonase         No follow-ups on file.

## 2024-05-01 NOTE — TELEPHONE ENCOUNTER
Notified pt form ready to be picked up from the pharmacy    Yaw 

## 2024-06-10 ENCOUNTER — TELEPHONE (OUTPATIENT)
Dept: FAMILY MEDICINE | Facility: CLINIC | Age: 59
End: 2024-06-10
Payer: COMMERCIAL

## 2024-06-10 NOTE — TELEPHONE ENCOUNTER
----- Message from Zahida Pierce sent at 6/10/2024 12:46 PM CDT -----  Contact: Patient, [phone]  Patient would like to get a referral.  Referral to what specialty:  Orthopedics  Does the patient want the referral with a specific physician:  [facility]  [facility]  Reason (be specific):  Right thumb pain  Does the patient already have the specialty clinic appointment scheduled:  No  If yes, what date is the appointment scheduled:   N/A  Is the insurance listed in Epic correct? (this is important for a referral):  Yes  Advised patient that once provider approves this either a nurse or  will return their call?:   Would the patient like a call back, or a response through their [facility] portal?:   Call back  Comments:

## 2024-06-12 ENCOUNTER — OFFICE VISIT (OUTPATIENT)
Dept: ORTHOPEDICS | Facility: CLINIC | Age: 59
End: 2024-06-12
Payer: COMMERCIAL

## 2024-06-12 ENCOUNTER — HOSPITAL ENCOUNTER (OUTPATIENT)
Dept: RADIOLOGY | Facility: HOSPITAL | Age: 59
Discharge: HOME | End: 2024-06-12
Attending: STUDENT IN AN ORGANIZED HEALTH CARE EDUCATION/TRAINING PROGRAM
Payer: COMMERCIAL

## 2024-06-12 VITALS — BODY MASS INDEX: 24.8 KG/M2 | HEIGHT: 66 IN | WEIGHT: 154.31 LBS

## 2024-06-12 DIAGNOSIS — M18.11 PRIMARY OSTEOARTHRITIS OF FIRST CARPOMETACARPAL JOINT OF RIGHT HAND: Primary | ICD-10-CM

## 2024-06-12 DIAGNOSIS — M79.641 RIGHT HAND PAIN: Primary | ICD-10-CM

## 2024-06-12 DIAGNOSIS — M79.641 RIGHT HAND PAIN: ICD-10-CM

## 2024-06-12 PROCEDURE — 20600 DRAIN/INJ JOINT/BURSA W/O US: CPT | Mod: RT,S$GLB,, | Performed by: STUDENT IN AN ORGANIZED HEALTH CARE EDUCATION/TRAINING PROGRAM

## 2024-06-12 PROCEDURE — 99999 PR PBB SHADOW E&M-EST. PATIENT-LVL III: CPT | Mod: PBBFAC,,, | Performed by: STUDENT IN AN ORGANIZED HEALTH CARE EDUCATION/TRAINING PROGRAM

## 2024-06-12 PROCEDURE — 97760 ORTHOTIC MGMT&TRAING 1ST ENC: CPT | Mod: S$GLB,,, | Performed by: STUDENT IN AN ORGANIZED HEALTH CARE EDUCATION/TRAINING PROGRAM

## 2024-06-12 PROCEDURE — 73130 X-RAY EXAM OF HAND: CPT | Mod: 26,RT,, | Performed by: RADIOLOGY

## 2024-06-12 PROCEDURE — 73130 X-RAY EXAM OF HAND: CPT | Mod: TC,RT

## 2024-06-12 PROCEDURE — 99203 OFFICE O/P NEW LOW 30 MIN: CPT | Mod: 25,S$GLB,, | Performed by: STUDENT IN AN ORGANIZED HEALTH CARE EDUCATION/TRAINING PROGRAM

## 2024-06-12 RX ORDER — TRIAMCINOLONE ACETONIDE 40 MG/ML
40 INJECTION, SUSPENSION INTRA-ARTICULAR; INTRAMUSCULAR
Status: DISCONTINUED | OUTPATIENT
Start: 2024-06-12 | End: 2024-06-12 | Stop reason: HOSPADM

## 2024-06-12 RX ADMIN — TRIAMCINOLONE ACETONIDE 40 MG: 40 INJECTION, SUSPENSION INTRA-ARTICULAR; INTRAMUSCULAR at 02:06

## 2024-06-12 NOTE — PROGRESS NOTES
Hand Surgery Clinic Note    Chief Complaint  Chief Complaint   Patient presents with    Right Hand - Pain, Swelling       History of Present Illness  59-year-old right-hand dominant female  presents for evaluation of right hand and thumb pain.  Symptoms have taken place for approximately 3 months.  Patient notices the pain is worse with overuse, particularly opening boxes.  She was 0/10 pain at rest but 7/10 pain with activity.  She has a history of prediabetes controlled with diet.  Most recent hemoglobin A1c was 6.0 on 2023.  No numbness or tingling.    Review of Systems  Review of systems negative for chest pain, shortness of breath, fevers, chills, nausea/vomiting.    Past Medical History  Past Medical History:   Diagnosis Date    DDD (degenerative disc disease), cervical     Hyperlipidemia     Hypertension     Leiomyoma     Parkinson's disease     Polymenorrhea     Prediabetes     Seasonal allergies        Past Surgical History  Past Surgical History:   Procedure Laterality Date    BUNIONECTOMY Left 2022    CATARACT EXTRACTION W/  INTRAOCULAR LENS IMPLANT Bilateral 2022, July     SECTION, LOW TRANSVERSE      x1    COLONOSCOPY N/A 2018    Procedure: COLONOSCOPY;  Surgeon: Carlos Oconnell III, MD;  Location: [facility] ENDO;  Service: Endoscopy;  Laterality: N/A;    COLONOSCOPY N/A 2023    Procedure: COLONOSCOPY;  Surgeon: Konstantin Mccormick MD;  Location: [facility] ENDO;  Service: Endoscopy;  Laterality: N/A;    GANGLION CYST EXCISION      HERNIA REPAIR      HYSTERECTOMY  2013    Lysis of adhesions and enterotomy closure  2013    ROBOTIC ASSISTED HYSTERECTOMY  2013    With unilateral SO    SHOULDER ARTHROSCOPY W/ ROTATOR CUFF REPAIR Left 2017    SMALL INTESTINE SURGERY  2013    Submaxillary gland drainage         Allergies  Review of patient's allergies indicates:   Allergen Reactions    Allegra-d 12 hour [fexofenadine-pseudoephedrine]  Palpitations    Decongestant allergy Other (See Comments)    Mobic [meloxicam]      Urinary frequency       Family History  Family History   Problem Relation Name Age of Onset    Hypertension Mother Viola Luna     Cancer Mother Viola Luna     Colon cancer Mother Viola Luna     Diabetes Father Shan Stauffer     Hypertension Father Shan Stauffer     Hypertension Sister Usha Hill     Thyroid disease Sister Usha Hill     Hypertension Brother Connor Stauffer     Hypertension Sister Iesha Chow     Hypertension Sister Eugenia Chow-Haywood     Hypertension Brother David Chow     Cataracts Maternal Grandmother         Social History  Social History     Socioeconomic History    Marital status: Single   Occupational History     Employer: [employer]   Tobacco Use    Smoking status: Never     Passive exposure: Never    Smokeless tobacco: Never   Substance and Sexual Activity    Alcohol use: Yes     Comment: seldomly    Drug use: No    Sexual activity: Yes     Partners: Female     Birth control/protection: None   Social History Narrative    The patient is  and has one child. She has remarried her female partner. 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                Social Determinants of Health     Financial Resource Strain: Low Risk  (6/30/2023)    Overall Financial Resource Strain (CARDIA)     Difficulty of Paying Living Expenses: Not hard at all   Food Insecurity: No Food Insecurity (6/30/2023)    Hunger Vital Sign     Worried About Running Out of Food in the Last Year: Never true     Ran Out of Food in the Last Year: Never true   Transportation Needs: No Transportation Needs (6/30/2023)    PRAPARE - Transportation     Lack of Transportation (Medical): No     Lack of Transportation (Non-Medical): No   Physical Activity: Insufficiently Active (6/30/2023)    Exercise Vital Sign     Days of Exercise per Week: 3 days     Minutes of Exercise per Session: 30  min   Stress: No Stress Concern Present (6/30/2023)    [employer] - Occupational Stress Questionnaire     Feeling of Stress : Not at all   Housing Stability: Low Risk  (6/30/2023)    Housing Stability Vital Sign     Unable to Pay for Housing in the Last Year: No     Number of Places Lived in the Last Year: 1     Unstable Housing in the Last Year: No       Vital Signs  There were no vitals filed for this visit.    Physical Exam  Constitutional: Appears well-developed and well-nourished. No distress.   HENT:   Head: Normocephalic.   Eyes: EOM are normal.   Pulmonary/Chest: Effort normal.   Neurological: Oriented to person, place, and time.   Psychiatric: Normal mood and affect.     Right Upper Extremity:  No abrasions, lacerations, wounds.  No swelling.  No erythema.  No tenderness over the 1st dorsal extensor compartment.  Negative Finkelstein's.  + tenderness over the thumb CMC joint.  Pain with CMC grind but no crepitus.  Positive thumb adduction and extension tests. No thumb MCP hyperextension.  No tenderness over the A1 pulleys of all 5 fingers.  No triggering with range of motion.  Patient is able to make a fist and extend all 5 fingers.  Sensation is intact in the median, radial, ulnar nerve distributions.  Palpable radial pulse.      Imaging  Right-hand x-rays three views were obtained today and independently reviewed by myself.  Severe arthritic changes are noted at the thumb CMC joint with presence of complete loss of joint space as well as osteophyte formation and subluxation.  Mild-to-moderate arthritic changes are noted at the thumb IP joint with decreased joint space radially and associated osteophyte formation.    Assessment and Plan  59-year-old right-hand dominant female presents with right thumb CMC arthritis which has been symptomatic for 3 months.    The nature and cause of thumb carpometacarpal osteoarthritis (thumb CMC arthritis) was discussed.  It was noted that the  fundamental cause relates mostly to genetic patterns underlying the natural biology of aging.  In women, in whom thumb CMC arthritis is more common, the influence of hormonal effects on ligament stability was noted.  The usual age of presentation and gradual development of symptoms was noted.  It was noted that in most cases, specific activities and trauma play a smaller role in influencing the progression and symptom patterns for thumb CMC arthritis.  Treatment options were discussed and noted to range from non-steroidal anti-inflammatory medication, use of a variety of specialty splints (both over-the-counter generic splints and those fabricated by certified hand therapy specialists), and possibly occupational therapy.  Treatment options also include the use of steroid injections for the affected joint, activity modifications, and ultimately surgical treatments.  Surgical treatments were noted to be the most long-lasting treatment, often not requiring future revision surgery, and usually not associated with changing any lifestyle or activity choices of the patient.  The decision to pursue surgery for treatment of thumb CMC arthritis was noted to primarily be a personal, quality-of-life, decision by the patient and is less so a medically mandated pathway.  It was also noted that while radiographs of the thumb CMC joint are helpful in correlating the patient's complaints with actual joint degeneration, many times there is a lack of exact association between the severity of radiographic findings and the degree of patient symptoms and impairment (such as pain and interference with activities).  A potential downside of waiting too long to pursue surgery was noted to be both prolonging the suffering or impact on lifestyle as well as the possible progression of adjacent deformity of the thumb metacarpophalangeal joint.  Loss of  strength following surgery was noted to be a possible downside of surgical treatment  although most patients are not meaningfully impacted by the amount of  strength loss following surgical treatment because pain relief is such a benefit.    I provided patient with a handout to read further on thumb arthritis.  I fitted patient for a CMC controller plus brace today.    At least 10 minutes were spent sizing, fitting, and educating for durable medical equipment application today.  This service was performed under the direction of Michelle Dolan MD.  CPT 91745.    I recommended a steroid injection into the thumb CMC joint.  Patient agreed to proceed.  See procedure note.  She tolerated procedure well.  Follow up in clinic as needed if symptoms recur or do not resolve.    Michelle Dolan MD  Orthopaedic Hand Surgery

## 2024-06-12 NOTE — PROCEDURES
Small Joint Aspiration/Injection: R thumb CMC    Date/Time: 6/12/2024 2:20 PM    Performed by: Michelle Dolan MD  Authorized by: Michelle Dolan MD    Consent Done?:  Yes (Verbal)  Indications:  Arthritis and pain  Timeout: prior to procedure the correct patient, procedure, and site was verified    Local anesthesia used?: Yes    Anesthesia:  Local infiltration  Local anesthetic:  Lidocaine 1% without epinephrine  Anesthetic total (ml):  1    Location:  Thumb  Site:  R thumb CMC  Needle size:  25 G  Approach:  Dorsal  Medications:  40 mg triamcinolone acetonide 40 mg/mL  Patient tolerance:  Patient tolerated the procedure well with no immediate complications

## 2024-06-13 DIAGNOSIS — M79.646 THUMB PAIN, UNSPECIFIED LATERALITY: Primary | ICD-10-CM

## 2024-06-27 ENCOUNTER — OFFICE VISIT (OUTPATIENT)
Dept: FAMILY MEDICINE | Facility: CLINIC | Age: 59
End: 2024-06-27
Payer: COMMERCIAL

## 2024-06-27 VITALS
OXYGEN SATURATION: 97 % | DIASTOLIC BLOOD PRESSURE: 82 MMHG | HEART RATE: 105 BPM | RESPIRATION RATE: 17 BRPM | WEIGHT: 155.63 LBS | HEIGHT: 66 IN | BODY MASS INDEX: 25.01 KG/M2 | SYSTOLIC BLOOD PRESSURE: 138 MMHG | TEMPERATURE: 97 F

## 2024-06-27 DIAGNOSIS — S76.312A STRAIN OF LEFT HAMSTRING, INITIAL ENCOUNTER: Primary | ICD-10-CM

## 2024-06-27 PROCEDURE — 99999 PR PBB SHADOW E&M-EST. PATIENT-LVL III: CPT | Mod: PBBFAC,,, | Performed by: INTERNAL MEDICINE

## 2024-06-27 PROCEDURE — 99213 OFFICE O/P EST LOW 20 MIN: CPT | Mod: S$GLB,,, | Performed by: INTERNAL MEDICINE

## 2024-06-27 RX ORDER — CYCLOBENZAPRINE HCL 5 MG
5 TABLET ORAL NIGHTLY
Qty: 30 TABLET | Refills: 0 | Status: SHIPPED | OUTPATIENT
Start: 2024-06-27 | End: 2024-07-27

## 2024-06-27 NOTE — PROGRESS NOTES
Subjective     Patient ID: Rosalva Stauffer is a 59 y.o. female.    Chief Complaint: Pain (Hamstring/Missed a step /On and off pain/Landed on foot )      HPI  Patient presents due to left hamstring pain. She has had pain intermittently for years but recently missed a step and exacerbated it. She has taken Flexeril in the past with some relief. She has tried PT several times with no relief. She indicates pain is located dorsal left upper leg (close to knee) and radiates up leg  Review of Systems   Constitutional:  Negative for chills and fatigue.   Respiratory:  Negative for chest tightness and shortness of breath.    Cardiovascular:  Negative for chest pain and palpitations.   Gastrointestinal:  Negative for abdominal pain, constipation, diarrhea, nausea and vomiting.   Genitourinary:  Negative for frequency and urgency.   Musculoskeletal:         Left hamstring pain   Neurological:  Negative for dizziness, numbness and headaches.          Objective       Current Outpatient Medications:     atorvastatin (LIPITOR) 20 MG tablet, Take 1 tablet (20 mg total) by mouth once daily., Disp: 90 tablet, Rfl: 3    azelastine (ASTELIN) 137 mcg (0.1 %) nasal spray, 1 spray (137 mcg total) by Nasal route 2 (two) times daily., Disp: 30 mL, Rfl: 0    carbidopa-levodopa  mg (SINEMET)  mg per tablet, , Disp: , Rfl:     cyclobenzaprine (FLEXERIL) 5 MG tablet, Take 1 tablet (5 mg total) by mouth nightly., Disp: 30 tablet, Rfl: 0    hydroCHLOROthiazide (MICROZIDE) 12.5 mg capsule, Take 1 capsule (12.5 mg total) by mouth once daily., Disp: 90 capsule, Rfl: 2    ibuprofen (ADVIL,MOTRIN) 800 MG tablet, , Disp: , Rfl:     methocarbamoL (ROBAXIN) 750 MG Tab, Take 750 mg by mouth 2 (two) times daily as needed., Disp: , Rfl:     multivitamin capsule, Take 1 capsule by mouth once daily., Disp: , Rfl:     pramipexole (MIRAPEX) 1 MG tablet, Take 1 mg by mouth nightly. Pt states she takes 1mg at night, Disp: , Rfl:     rasagiline (AZILECT)  1 mg Tab, Take 1 mg by mouth once daily., Disp: , Rfl:      Physical Exam  Constitutional:       General: She is not in acute distress.     Appearance: Normal appearance. She is normal weight.   HENT:      Head: Normocephalic and atraumatic.   Cardiovascular:      Rate and Rhythm: Normal rate and regular rhythm.      Heart sounds: Normal heart sounds. No murmur heard.     No friction rub. No gallop.   Pulmonary:      Effort: Pulmonary effort is normal.      Breath sounds: Normal breath sounds. No wheezing, rhonchi or rales.   Abdominal:      General: Bowel sounds are normal. There is no distension.      Palpations: Abdomen is soft.      Tenderness: There is no abdominal tenderness. There is no rebound.   Skin:     General: Skin is warm and dry.      Coloration: Skin is not jaundiced.   Neurological:      General: No focal deficit present.      Mental Status: She is alert and oriented to person, place, and time. Mental status is at baseline.   Psychiatric:         Mood and Affect: Mood normal.         Behavior: Behavior normal.            Assessment and Plan     1. Strain of left hamstring, initial encounter  Comments:  Will refill Flexeril. Patient does see Ortho and encouraged to make an appointement with them. Advised patient to gently stretch as well  Orders:  -     cyclobenzaprine (FLEXERIL) 5 MG tablet; Take 1 tablet (5 mg total) by mouth nightly.  Dispense: 30 tablet; Refill: 0               No follow-ups on file.

## 2024-06-28 ENCOUNTER — HOSPITAL ENCOUNTER (OUTPATIENT)
Dept: RADIOLOGY | Facility: HOSPITAL | Age: 59
Discharge: HOME | End: 2024-06-28
Attending: PHYSICIAN ASSISTANT
Payer: COMMERCIAL

## 2024-06-28 DIAGNOSIS — M25.552 PAIN OF LEFT HIP: Primary | ICD-10-CM

## 2024-06-28 DIAGNOSIS — M25.552 PAIN OF LEFT HIP: ICD-10-CM

## 2024-06-28 RX ORDER — ATORVASTATIN CALCIUM 20 MG/1
20 TABLET, FILM COATED ORAL
Qty: 90 TABLET | Refills: 0 | Status: SHIPPED | OUTPATIENT
Start: 2024-06-28

## 2024-06-28 NOTE — TELEPHONE ENCOUNTER
Refill Routing Note   Medication(s) are not appropriate for processing by [facility] for the following reason(s):        Required labs outdated    [facility] action(s):  Defer   Requires labs : Yes             Appointments  past 12m or future 3m with PCP    Date Provider   Last Visit   7/3/2023 Lalita Patterson MD   Next Visit   7/5/2024 Lalita Patterson MD   ED visits in past 90 days: 0        Note composed:5:47 AM 06/28/2024

## 2024-06-28 NOTE — TELEPHONE ENCOUNTER
Care Due:                  Date            Visit Type   Department     Provider  --------------------------------------------------------------------------------                                EP -                              PRIMARY      [facility]  Last Visit: 06-      CARE ([facility])   MEDICINE       Yuliya Eaton  Next Visit: None Scheduled  None         None Found                                                            Last  Test          Frequency    Reason                     Performed    Due Date  --------------------------------------------------------------------------------    CMP.........  12 months..  atorvastatin,              07- 06-                             hydroCHLOROthiazide......    Lipid Panel.  12 months..  atorvastatin.............  07- 06-    [facility] Embedded Care Due Messages. Reference number: [id number].   6/28/2024 4:07:15 AM CDT

## 2024-07-05 ENCOUNTER — PATIENT MESSAGE (OUTPATIENT)
Dept: FAMILY MEDICINE | Facility: CLINIC | Age: 59
End: 2024-07-05

## 2024-07-05 ENCOUNTER — OFFICE VISIT (OUTPATIENT)
Dept: FAMILY MEDICINE | Facility: CLINIC | Age: 59
End: 2024-07-05
Payer: COMMERCIAL

## 2024-07-05 ENCOUNTER — LAB VISIT (OUTPATIENT)
Dept: LAB | Facility: HOSPITAL | Age: 59
End: 2024-07-05
Attending: FAMILY MEDICINE
Payer: COMMERCIAL

## 2024-07-05 VITALS
HEART RATE: 84 BPM | HEIGHT: 66 IN | OXYGEN SATURATION: 98 % | SYSTOLIC BLOOD PRESSURE: 114 MMHG | DIASTOLIC BLOOD PRESSURE: 66 MMHG | TEMPERATURE: 98 F | WEIGHT: 154.88 LBS | BODY MASS INDEX: 24.89 KG/M2 | RESPIRATION RATE: 18 BRPM

## 2024-07-05 DIAGNOSIS — I10 ESSENTIAL HYPERTENSION: ICD-10-CM

## 2024-07-05 DIAGNOSIS — R73.03 PREDIABETES: ICD-10-CM

## 2024-07-05 DIAGNOSIS — E78.5 HYPERLIPIDEMIA, UNSPECIFIED HYPERLIPIDEMIA TYPE: ICD-10-CM

## 2024-07-05 DIAGNOSIS — Z00.00 PREVENTATIVE HEALTH CARE: Primary | ICD-10-CM

## 2024-07-05 DIAGNOSIS — Z00.00 PREVENTATIVE HEALTH CARE: ICD-10-CM

## 2024-07-05 DIAGNOSIS — G20.A1 PARKINSON'S DISEASE, UNSPECIFIED WHETHER DYSKINESIA PRESENT, UNSPECIFIED WHETHER MANIFESTATIONS FLUCTUATE: ICD-10-CM

## 2024-07-05 LAB
ALBUMIN SERPL BCP-MCNC: 4.2 G/DL (ref 3.5–5.2)
ALP SERPL-CCNC: 122 U/L (ref 55–135)
ALT SERPL W/O P-5'-P-CCNC: 15 U/L (ref 10–44)
ANION GAP SERPL CALC-SCNC: 9 MMOL/L (ref 8–16)
AST SERPL-CCNC: 49 U/L (ref 10–40)
BASOPHILS # BLD AUTO: 0.03 K/UL (ref 0–0.2)
BASOPHILS NFR BLD: 0.7 % (ref 0–1.9)
BILIRUB SERPL-MCNC: 0.6 MG/DL (ref 0.1–1)
BUN SERPL-MCNC: 12 MG/DL (ref 6–20)
CALCIUM SERPL-MCNC: 10.4 MG/DL (ref 8.7–10.5)
CHLORIDE SERPL-SCNC: 101 MMOL/L (ref 95–110)
CHOLEST SERPL-MCNC: 212 MG/DL (ref 120–199)
CHOLEST/HDLC SERPL: 2.5 {RATIO} (ref 2–5)
CO2 SERPL-SCNC: 28 MMOL/L (ref 23–29)
CREAT SERPL-MCNC: 1 MG/DL (ref 0.5–1.4)
DIFFERENTIAL METHOD BLD: ABNORMAL
EOSINOPHIL # BLD AUTO: 0.5 K/UL (ref 0–0.5)
EOSINOPHIL NFR BLD: 11.1 % (ref 0–8)
ERYTHROCYTE [DISTWIDTH] IN BLOOD BY AUTOMATED COUNT: 14.1 % (ref 11.5–14.5)
EST. GFR  (NO RACE VARIABLE): >60 ML/MIN/1.73 M^2
ESTIMATED AVG GLUCOSE: 131 MG/DL (ref 68–131)
GLUCOSE SERPL-MCNC: 100 MG/DL (ref 70–110)
HBA1C MFR BLD: 6.2 % (ref 4–5.6)
HCT VFR BLD AUTO: 42.5 % (ref 37–48.5)
HDLC SERPL-MCNC: 86 MG/DL (ref 40–75)
HDLC SERPL: 40.6 % (ref 20–50)
HGB BLD-MCNC: 13.2 G/DL (ref 12–16)
IMM GRANULOCYTES # BLD AUTO: 0 K/UL (ref 0–0.04)
IMM GRANULOCYTES NFR BLD AUTO: 0 % (ref 0–0.5)
LDLC SERPL CALC-MCNC: 113.6 MG/DL (ref 63–159)
LYMPHOCYTES # BLD AUTO: 1.6 K/UL (ref 1–4.8)
LYMPHOCYTES NFR BLD: 34.7 % (ref 18–48)
MCH RBC QN AUTO: 28.6 PG (ref 27–31)
MCHC RBC AUTO-ENTMCNC: 31.1 G/DL (ref 32–36)
MCV RBC AUTO: 92 FL (ref 82–98)
MONOCYTES # BLD AUTO: 0.4 K/UL (ref 0.3–1)
MONOCYTES NFR BLD: 9.1 % (ref 4–15)
NEUTROPHILS # BLD AUTO: 2 K/UL (ref 1.8–7.7)
NEUTROPHILS NFR BLD: 44.4 % (ref 38–73)
NONHDLC SERPL-MCNC: 126 MG/DL
NRBC BLD-RTO: 0 /100 WBC
PLATELET # BLD AUTO: 287 K/UL (ref 150–450)
PMV BLD AUTO: 11.9 FL (ref 9.2–12.9)
POTASSIUM SERPL-SCNC: 4.5 MMOL/L (ref 3.5–5.1)
PROT SERPL-MCNC: 8 G/DL (ref 6–8.4)
RBC # BLD AUTO: 4.61 M/UL (ref 4–5.4)
SODIUM SERPL-SCNC: 138 MMOL/L (ref 136–145)
TRIGL SERPL-MCNC: 62 MG/DL (ref 30–150)
TSH SERPL DL<=0.005 MIU/L-ACNC: 0.76 UIU/ML (ref 0.4–4)
WBC # BLD AUTO: 4.49 K/UL (ref 3.9–12.7)

## 2024-07-05 PROCEDURE — 99999 PR PBB SHADOW E&M-EST. PATIENT-LVL IV: CPT | Mod: PBBFAC,,, | Performed by: FAMILY MEDICINE

## 2024-07-05 PROCEDURE — 36415 COLL VENOUS BLD VENIPUNCTURE: CPT | Mod: PO | Performed by: FAMILY MEDICINE

## 2024-07-05 PROCEDURE — 80061 LIPID PANEL: CPT | Performed by: FAMILY MEDICINE

## 2024-07-05 PROCEDURE — 83036 HEMOGLOBIN GLYCOSYLATED A1C: CPT | Performed by: FAMILY MEDICINE

## 2024-07-05 PROCEDURE — 80053 COMPREHEN METABOLIC PANEL: CPT | Performed by: FAMILY MEDICINE

## 2024-07-05 PROCEDURE — 85025 COMPLETE CBC W/AUTO DIFF WBC: CPT | Performed by: FAMILY MEDICINE

## 2024-07-05 PROCEDURE — 84443 ASSAY THYROID STIM HORMONE: CPT | Performed by: FAMILY MEDICINE

## 2024-07-05 RX ORDER — HYDROCHLOROTHIAZIDE 12.5 MG/1
12.5 CAPSULE ORAL DAILY
Qty: 90 CAPSULE | Refills: 3 | Status: SHIPPED | OUTPATIENT
Start: 2024-07-05

## 2024-07-05 RX ORDER — ATORVASTATIN CALCIUM 20 MG/1
20 TABLET, FILM COATED ORAL DAILY
Qty: 90 TABLET | Refills: 3 | Status: SHIPPED | OUTPATIENT
Start: 2024-07-05

## 2024-07-05 NOTE — PROGRESS NOTES
CHIEF COMPLAINT:  This is a 59-year-old female here for preventive health exam.     SUBJECTIVE:  Patient is doing well without complaints except for left hamstring pain which has improved with exercise. The patient is taking Sinemet and Azilect for Parkinson's disease per her neurologist.  She has a tremor and was told that she had mild disease. She takes Mirapex for restless leg syndrome.  Her hypertension is controlled on HCTZ.  She has dyslipidemia for which she takes atorvastatin. She has prediabetes with last A1c 6%.  She is exercising regularly.  She had sleep study which indicated that she had narcolepsy which she rejects.     Eye exam 2024. Mammogram February 2023. Colonoscopy February 2023 due February 2028. Tdap September 2011. Flu vaccine October 2023. COVID 19 vaccine March, April, October 2021, October 2022. Shingrix series completed.  Pneumovax October 2020.     ROS:  GENERAL: Patient denies fever, chills, night sweats. Patient denies weight gain or loss. Patient denies anorexia, fatigue, weakness or swollen glands.  SKIN: Patient denies rash or hair loss.  HEENT: Patient denies sore throat, ear pain, hearing loss, nasal congestion, or runny nose. Patient denies visual disturbance, eye irritation or discharge.  LUNGS: Patient denies cough, wheeze or hemoptysis.  CARDIOVASCULAR: Patient denies chest pain, shortness of breath, palpitations, syncope or lower extremity edema.  GI: Patient denies abdominal pain, nausea, vomiting, diarrhea, constipation, blood in stool or melena.  GENITOURINARY: Patient denies pelvic pain, vaginal discharge, itch or odor. Patient denies irregular vaginal bleeding. Patient denies dysuria, frequency, hematuria, nocturia, urgency or incontinence.  BREASTS: Patient denies breast pain, mass or nipple discharge.  MUSCULOSKELETAL: Patient denies joint pain, swelling, redness or warmth.  NEUROLOGIC: Patient denies headache, vertigo, paresthesias, weakness in limb, dysarthria,  dysphagia or abnormality of gait.  PSYCHIATRIC: Patient denies anxiety, depression, or memory loss.     OBJECTIVE:   GENERAL: Well-developed well-nourished black female alert and oriented x3, in no acute distress. Memory, judgment and cognition without deficit.   SKIN: Clear without rash. Normal color and tone.   HEENT: Eyes: Clear conjunctivae. No scleral icterus. Pupils equal reactive to light and accommodation. Ears: Clear TMs. Clear canals. Nose: Without congestion. Pharynx: Without injection or exudates.   NECK: Supple, normal range of motion. No masses, lymphadenopathy or enlarged thyroid. No JVD. Carotids 2+ and equal. No bruits.   LUNGS: Clear to auscultation. Normal respiratory effort.   CARDIOVASCULAR: Regular rhythm, normal S1, S2 without murmur, gallop or rub.   BACK: No CVA or spinal tenderness.   BREASTS: No masses, tenderness or nipple discharge.   ABDOMEN: Normal appearance. Active bowel sounds. Soft, nontender without mass or organomegaly. No rebound or guarding. Well-healed incisional scars.   EXTREMITIES: Without cyanosis, clubbing or edema. Distal pulses 2+ and equal. Normal range of motion in all extremities. No joint effusion, erythema or warmth.   NEUROLOGIC: Cranial nerves II through XII without deficit. Motor strength equal bilaterally. Sensation normal to touch. Deep tendon reflexes 2+ and equal. Gait without abnormality. No tremor. Negative cerebellar signs.   PELVIC:  External: Without lesions or inflammation. Vaginal: Cuff intact. Bimanual: Normal size and shape. Adnexa: Non-tender, without masses. Rectovaginal: Confirms, heme-negative stool x2.     ASSESSMENT:  1. Preventative health care    2. Prediabetes    3. Parkinson's disease, unspecified whether dyskinesia present, unspecified whether manifestations fluctuate    4. Hyperlipidemia, unspecified hyperlipidemia type    5. Essential hypertension      PLAN:  1. Weight reduction. Exercise regularly.  2. Age-appropriate counseling.  3.  Fasting lab.  4. Refill medication.    5. Follow-up annually.    This note is generated with speech recognition software and is subject to transcription error and sound alike phrases that may be missed by proofreading.

## 2024-09-13 ENCOUNTER — OFFICE VISIT (OUTPATIENT)
Dept: FAMILY MEDICINE | Facility: CLINIC | Age: 59
End: 2024-09-13
Payer: COMMERCIAL

## 2024-09-13 ENCOUNTER — HOSPITAL ENCOUNTER (OUTPATIENT)
Dept: RADIOLOGY | Facility: HOSPITAL | Age: 59
Discharge: HOME | End: 2024-09-13
Attending: FAMILY MEDICINE
Payer: COMMERCIAL

## 2024-09-13 ENCOUNTER — HOSPITAL ENCOUNTER (OUTPATIENT)
Dept: RADIOLOGY | Facility: HOSPITAL | Age: 59
Discharge: HOME | End: 2024-09-13
Attending: PHYSICIAN ASSISTANT
Payer: COMMERCIAL

## 2024-09-13 VITALS
OXYGEN SATURATION: 100 % | BODY MASS INDEX: 24.73 KG/M2 | HEART RATE: 81 BPM | DIASTOLIC BLOOD PRESSURE: 80 MMHG | WEIGHT: 153.88 LBS | HEIGHT: 66 IN | SYSTOLIC BLOOD PRESSURE: 132 MMHG | TEMPERATURE: 98 F

## 2024-09-13 DIAGNOSIS — S99.921A INJURY OF TOE ON RIGHT FOOT, INITIAL ENCOUNTER: ICD-10-CM

## 2024-09-13 DIAGNOSIS — M25.552 PAIN OF LEFT HIP: ICD-10-CM

## 2024-09-13 DIAGNOSIS — M54.10 RADICULOPATHY: ICD-10-CM

## 2024-09-13 DIAGNOSIS — G20.A1 PARKINSON'S DISEASE WITHOUT DYSKINESIA OR FLUCTUATING MANIFESTATIONS: ICD-10-CM

## 2024-09-13 DIAGNOSIS — I10 ESSENTIAL HYPERTENSION: ICD-10-CM

## 2024-09-13 DIAGNOSIS — R55 SYNCOPE AND COLLAPSE: Primary | ICD-10-CM

## 2024-09-13 PROCEDURE — 72100 X-RAY EXAM L-S SPINE 2/3 VWS: CPT | Mod: TC,FY,PO

## 2024-09-13 PROCEDURE — 99999 PR PBB SHADOW E&M-EST. PATIENT-LVL III: CPT | Mod: PBBFAC,,, | Performed by: FAMILY MEDICINE

## 2024-09-13 PROCEDURE — 73660 X-RAY EXAM OF TOE(S): CPT | Mod: TC,FY,PO

## 2024-09-13 PROCEDURE — 72100 X-RAY EXAM L-S SPINE 2/3 VWS: CPT | Mod: 26,,, | Performed by: RADIOLOGY

## 2024-09-13 PROCEDURE — 73660 X-RAY EXAM OF TOE(S): CPT | Mod: 26,RT,, | Performed by: RADIOLOGY

## 2024-09-13 NOTE — PROGRESS NOTES
CHIEF COMPLAINT:  This is a 59-year-old female here for follow-up syncope.  Subjective     History of Present Illness    Patient presents today for follow up after a fainting episode. She reports a fainting episode after taking carbidopa-levodopa. On the day of the incident, she took 2 tablets around 7:00 PM.  She had changed her normal dosing regimen to 1-1/2 tablets at 6:30 AM and 12:30 PM because of drowsiness and anxiety and 2 tablets  6:30 PM and 10:30 PM. She had not eaten since 12:00 PM, with the episode occurring around 8:00 PM. Shortly after arriving home and attempting to feed her dogs, she experienced dizziness and subsequently lost consciousness. She recalls waking up on the floor with people around her. Emergency Medical Services (EMS) were called and measured her blood pressure, reporting it as low with a diastolic reading of approximately 60 mmHg. A second measurement after standing showed her blood pressure had returned to normal. She acknowledges a previous similar incident when she took medication without eating.  She reports feeling okay today, but mentions pain in her great toe from the fall. She denies any significant head pain or symptoms of concussion. She reports recent onset of sciatic nerve pain on the left, which is her first experience with this condition. She recently consulted with Dr. Medina, pain management at [facility], for this issue. She is scheduled for x-rays of her lower lumbar spine to evaluate the sciatica and associated groin pain. The x-ray order has been confirmed, but the imaging has not yet been completed.    The patient is taking Sinemet and Azilect for Parkinson's disease per her neurologist.  She has a tremor and was told that she had mild disease. She takes Mirapex for restless leg syndrome.  Her hypertension is controlled on HCTZ.  She has dyslipidemia for which she takes atorvastatin. She has prediabetes with last A1c 6%.  She is exercising regularly.  She  had sleep study which indicated that she had narcolepsy which she rejects.     ROS:  GENERAL: Patient denies fever, chills, night sweats. Patient denies weight gain or loss. Patient denies anorexia, fatigue, weakness or swollen glands.  SKIN: Patient denies rash or hair loss.  HEENT: Patient denies sore throat, ear pain, hearing loss, nasal congestion, or runny nose. Patient denies visual disturbance, eye irritation or discharge.  LUNGS: Patient denies cough, wheeze or hemoptysis.  CARDIOVASCULAR: Patient denies chest pain, shortness of breath, palpitations, or lower extremity edema.  Positive for syncope.  GI: Patient denies abdominal pain, nausea, vomiting, diarrhea, constipation, blood in stool or melena.  GENITOURINARY: Patient denies pelvic pain, vaginal discharge, itch or odor. Patient denies irregular vaginal bleeding. Patient denies dysuria, frequency, hematuria, nocturia, urgency or incontinence.  MUSCULOSKELETAL: Patient denies joint pain, swelling, redness or warmth.  Positive for toe pain.  NEUROLOGIC: Patient denies headache, vertigo, paresthesias, weakness in limb, dysarthria, dysphagia or abnormality of gait.  PSYCHIATRIC: Patient denies anxiety, depression, or memory loss.     OBJECTIVE:   GENERAL: Well-developed well-nourished black female alert and oriented x3, in no acute distress. Memory, judgment and cognition without deficit.  Normal affect.  No hallucinations, delusions or flight ideas.  SKIN: Clear without rash. Normal color and tone.   HEENT: Eyes: Clear conjunctivae. No scleral icterus. Pupils equal reactive to light and accommodation.  Extraocular movements intact.  No nystagmus.  Fundi not visualized.  Ears:  Clear canals.  Negative hemotympanum.  Nose: Without congestion.  No septal hematoma.  Pharynx: Without injection or exudates.   NECK: Supple, normal range of motion. No masses, lymphadenopathy or enlarged thyroid. No JVD. Carotids 2+ and equal. No bruits.   LUNGS: Clear to  auscultation. Normal respiratory effort.   CARDIOVASCULAR: Regular rhythm, normal S1, S2 without murmur, gallop or rub.   EXTREMITIES: Without cyanosis, clubbing or edema. Distal pulses 2+ and equal. Normal range of motion in all extremities. No joint effusion, erythema or warmth.  Slight tenderness dorsal surface of proximal phalanx right great toe.  No swelling or erythema noted.  NEUROLOGIC: Cranial nerves II through XII without deficit. Motor strength equal bilaterally. Sensation normal to touch. Deep tendon reflexes 2+ and equal. Gait without abnormality. No tremor. Negative cerebellar signs.     ASSESSMENT:  1. Syncope and collapse    2. Parkinson's disease without dyskinesia or fluctuating manifestations    3. Essential hypertension    4. Injury of toe on right foot, initial encounter      PLAN:  1. Check CBC and CMP.    2. X-ray right great toe.    3. Patient encouraged to eat regularly before taking medication.  4. Keep well hydrated.  5. Monitor blood pressure.  Report readings less than or equal to 105/60.  6. Follow-up with any further incidents.  Consider cardiac workup.    30 minutes of total time spent on the encounter, which includes face to face time and non-face to face time preparing to see the patient (eg, review of tests), Obtaining and/or reviewing separately obtained history, Documenting clinical information in the electronic or other health record, Independently interpreting results (not separately reported) and communicating results to the patient/family/caregiver, or Care coordination (not separately reported).     This note is generated with speech recognition software and is subject to transcription error and sound alike phrases that may be missed by proofreading.    This note was generated with the assistance of ambient listening technology. Verbal consent was obtained by the patient and accompanying visitor(s) for the recording of patient appointment to facilitate this note. I attest to  having reviewed and edited the generated note for accuracy, though some syntax or spelling errors may persist. Please contact the author of this note for any clarification.

## 2024-09-19 ENCOUNTER — TELEPHONE (OUTPATIENT)
Dept: FAMILY MEDICINE | Facility: CLINIC | Age: 59
End: 2024-09-19
Payer: COMMERCIAL

## 2024-09-19 NOTE — TELEPHONE ENCOUNTER
----- Message from Theresa Damon sent at 9/18/2024  3:26 PM CDT -----  Regarding: X-ray appointments.  Good afternoon,    I have Ms. Blackwell on the phone. Stated the x-ray that she did of the lumbar spine was suppose to go to Dr. Charito Medina. But the orders for that was for the x-ray f the toes and lumbar PA and lateral. Is the way someone can call her to see if the results can get sent off to Dr. Medina.  I do see where an x-ray for a lumbar spine 2 or 3 views was ordered on 09/10.      Thanks,  [facility]  Radiology Scheduler  [phone]

## 2024-10-11 RX ORDER — ATORVASTATIN CALCIUM 20 MG/1
20 TABLET, FILM COATED ORAL
Qty: 90 TABLET | Refills: 2 | Status: SHIPPED | OUTPATIENT
Start: 2024-10-11

## 2024-10-11 NOTE — TELEPHONE ENCOUNTER
No care due was identified.  [facility] Embedded Care Due Messages. Reference number: [id number].   10/11/2024 4:01:54 AM CDT

## 2024-10-11 NOTE — TELEPHONE ENCOUNTER
Refill Decision Note   Rosalva Stauffer  is requesting a refill authorization.  Brief Assessment and Rationale for Refill:  Approve     Medication Therapy Plan:         Comments:     Note composed:9:31 AM 10/11/2024

## 2024-11-04 ENCOUNTER — HOSPITAL ENCOUNTER (OUTPATIENT)
Dept: RADIOLOGY | Facility: HOSPITAL | Age: 59
Discharge: HOME | End: 2024-11-04
Attending: FAMILY MEDICINE
Payer: COMMERCIAL

## 2024-11-04 ENCOUNTER — OFFICE VISIT (OUTPATIENT)
Dept: FAMILY MEDICINE | Facility: CLINIC | Age: 59
End: 2024-11-04
Payer: COMMERCIAL

## 2024-11-04 VITALS
WEIGHT: 145.94 LBS | BODY MASS INDEX: 23.46 KG/M2 | HEIGHT: 66 IN | TEMPERATURE: 99 F | DIASTOLIC BLOOD PRESSURE: 80 MMHG | OXYGEN SATURATION: 98 % | SYSTOLIC BLOOD PRESSURE: 130 MMHG | HEART RATE: 80 BPM

## 2024-11-04 DIAGNOSIS — M54.16 LUMBAR RADICULOPATHY: Primary | ICD-10-CM

## 2024-11-04 DIAGNOSIS — I10 ESSENTIAL HYPERTENSION: Chronic | ICD-10-CM

## 2024-11-04 DIAGNOSIS — Z00.00 PREVENTATIVE HEALTH CARE: ICD-10-CM

## 2024-11-04 DIAGNOSIS — G20.A1 PARKINSON'S DISEASE WITHOUT DYSKINESIA OR FLUCTUATING MANIFESTATIONS: Chronic | ICD-10-CM

## 2024-11-04 DIAGNOSIS — E78.5 HYPERLIPIDEMIA, UNSPECIFIED HYPERLIPIDEMIA TYPE: Chronic | ICD-10-CM

## 2024-11-04 LAB
BACTERIA #/AREA URNS AUTO: ABNORMAL /HPF
BILIRUB UR QL STRIP: NEGATIVE
CAOX CRY UR QL COMP ASSIST: ABNORMAL
CLARITY UR REFRACT.AUTO: CLEAR
COLOR UR AUTO: YELLOW
GLUCOSE UR QL STRIP: NEGATIVE
HGB UR QL STRIP: NEGATIVE
HYALINE CASTS UR QL AUTO: 1 /LPF
KETONES UR QL STRIP: ABNORMAL
LEUKOCYTE ESTERASE UR QL STRIP: ABNORMAL
MICROSCOPIC COMMENT: ABNORMAL
NITRITE UR QL STRIP: NEGATIVE
PH UR STRIP: 6 [PH] (ref 5–8)
PROT UR QL STRIP: ABNORMAL
RBC #/AREA URNS AUTO: 1 /HPF (ref 0–4)
SP GR UR STRIP: 1.02 (ref 1–1.03)
SQUAMOUS #/AREA URNS AUTO: 1 /HPF
URN SPEC COLLECT METH UR: ABNORMAL
WBC #/AREA URNS AUTO: 3 /HPF (ref 0–5)

## 2024-11-04 PROCEDURE — 71046 X-RAY EXAM CHEST 2 VIEWS: CPT | Mod: 26,,, | Performed by: RADIOLOGY

## 2024-11-04 PROCEDURE — 71046 X-RAY EXAM CHEST 2 VIEWS: CPT | Mod: TC,FY,PO

## 2024-11-04 PROCEDURE — 99214 OFFICE O/P EST MOD 30 MIN: CPT | Mod: 25,S$GLB,, | Performed by: FAMILY MEDICINE

## 2024-11-04 PROCEDURE — 99999 PR PBB SHADOW E&M-EST. PATIENT-LVL III: CPT | Mod: PBBFAC,,, | Performed by: FAMILY MEDICINE

## 2024-11-04 PROCEDURE — 93010 ELECTROCARDIOGRAM REPORT: CPT | Mod: S$GLB,,, | Performed by: INTERNAL MEDICINE

## 2024-11-04 PROCEDURE — 93005 ELECTROCARDIOGRAM TRACING: CPT | Mod: S$GLB,,, | Performed by: FAMILY MEDICINE

## 2024-11-04 PROCEDURE — 81001 URINALYSIS AUTO W/SCOPE: CPT | Performed by: FAMILY MEDICINE

## 2024-11-04 NOTE — PROGRESS NOTES
CHIEF COMPLAINT:  This is a 59-year-old female here for preoperative clearance for lumbar fusion per Dr. Nba Hardin due to chronic medical conditions.     SUBJECTIVE:  Patient has intractable lower back pain due to lumbar radiculopathy and is scheduled for a lumbar fusion on November 18. She denies previous problem with general anesthesia. She denies family history of perioperative complications.    The patient is taking Sinemet and Azilect for Parkinson's disease per her neurologist.  She has a tremor and was told that she had mild disease. She takes Mirapex for restless leg syndrome.  Her hypertension is controlled on HCTZ.  She has dyslipidemia for which she takes atorvastatin. She has prediabetes with last A1c 6%.   She had sleep study which indicated that she had narcolepsy which she rejects. She is exercising regularly.      ROS:  GENERAL: Patient denies fever, chills, night sweats. Patient denies weight gain or loss. Patient denies anorexia, fatigue, weakness or swollen glands.  SKIN: Patient denies rash or hair loss.  HEENT: Patient denies sore throat, ear pain, hearing loss, nasal congestion, or runny nose. Patient denies visual disturbance, eye irritation or discharge.  LUNGS: Patient denies cough, wheeze or hemoptysis.  CARDIOVASCULAR: Patient denies chest pain, shortness of breath, palpitations, syncope or lower extremity edema.  GI: Patient denies abdominal pain, nausea, vomiting, diarrhea, constipation, blood in stool or melena.  GENITOURINARY: Patient denies pelvic pain, vaginal discharge, itch or odor. Patient denies irregular vaginal bleeding. Patient denies dysuria, frequency, hematuria, nocturia, urgency or incontinence.  MUSCULOSKELETAL: Patient denies joint pain, swelling, redness or warmth.  POSITIVE FOR BACK PAIN.  NEUROLOGIC: Patient denies headache, vertigo, paresthesias, weakness in limb, dysarthria, dysphagia or abnormality of gait.  PSYCHIATRIC: Patient denies anxiety, depression, or  memory loss.     OBJECTIVE:   GENERAL: Well-developed well-nourished black female alert and oriented x3, in no acute distress. Memory, judgment and cognition without deficit.   SKIN: Clear without rash. Normal color and tone.   HEENT: Eyes: Clear conjunctivae. No scleral icterus. Pupils equal reactive to light and accommodation. Ears: Clear TMs. Clear canals. Nose: Without congestion. Pharynx: Without injection or exudates.   NECK: Supple, normal range of motion. No masses, lymphadenopathy or enlarged thyroid. No JVD. Carotids 2+ and equal. No bruits.   LUNGS: Clear to auscultation. Normal respiratory effort.   CARDIOVASCULAR: Regular rhythm, normal S1, S2 without murmur, gallop or rub.   BACK: No CVA or spinal tenderness.   EXTREMITIES: Without cyanosis, clubbing or edema. Distal pulses 2+ and equal. Normal range of motion in all extremities. No joint effusion, erythema or warmth.   NEUROLOGIC:  Motor strength equal bilaterally. Sensation normal to touch.  Gait without abnormality. No tremor    EKG:  Normal sinus rhythm.  No acute changes.  Chest x-ray:  No acute cardiopulmonary disease.    Lab:  Acceptable.    ASSESSMENT:  1. Lumbar radiculopathy    2. Parkinson's disease without dyskinesia or fluctuating manifestations    3. Essential hypertension    4. Hyperlipidemia, unspecified hyperlipidemia type    5. Preventative health care      PLAN:  1. Avoid aspirin, NSAIDs and fish oil 5-7 days prior to surgery.    2. Continue regular medications.    3. Patient is cleared for surgery.      Thank you for allowing me to participate in the care of this patient.    30 minutes of total time spent on the encounter, which includes face to face time and non-face to face time preparing to see the patient (eg, review of tests), Obtaining and/or reviewing separately obtained history, Documenting clinical information in the electronic or other health record, Independently interpreting results (not separately reported) and  communicating results to the patient/family/caregiver, or Care coordination (not separately reported).     This note is generated with speech recognition software and is subject to transcription error and sound alike phrases that may be missed by proofreading.

## 2024-11-05 LAB
OHS QRS DURATION: 86 MS
OHS QTC CALCULATION: 430 MS

## 2024-11-07 ENCOUNTER — TELEPHONE (OUTPATIENT)
Dept: FAMILY MEDICINE | Facility: CLINIC | Age: 59
End: 2024-11-07
Payer: COMMERCIAL

## 2024-11-07 NOTE — TELEPHONE ENCOUNTER
----- Message from Zahida sent at 11/7/2024 10:48 AM CST -----  Contact: Kenyatta with [facility] phone [phone] fax [phone]  Calling to get the pre op clearance and test results. Please fax. Thanks.

## 2024-11-26 ENCOUNTER — TELEPHONE (OUTPATIENT)
Dept: FAMILY MEDICINE | Facility: CLINIC | Age: 59
End: 2024-11-26
Payer: COMMERCIAL

## 2024-11-26 NOTE — TELEPHONE ENCOUNTER
----- Message from Lanre sent at 11/26/2024 11:13 AM CST -----  Contact: Elva Lyon Stage  Type:  Needs Medical Advice    Who Called: Yanelis  Would the patient rather a call back or a response via [facility]? Call   Best Call Back Number:  [phone]  Additional Information:  Yanelis would like a call back in regards to information she is needing to verify for the pt disability claim.

## 2024-11-29 ENCOUNTER — TELEPHONE (OUTPATIENT)
Dept: FAMILY MEDICINE | Facility: CLINIC | Age: 59
End: 2024-11-29
Payer: COMMERCIAL

## 2024-12-05 ENCOUNTER — PATIENT MESSAGE (OUTPATIENT)
Dept: FAMILY MEDICINE | Facility: CLINIC | Age: 59
End: 2024-12-05
Payer: COMMERCIAL

## 2024-12-10 ENCOUNTER — PATIENT MESSAGE (OUTPATIENT)
Dept: FAMILY MEDICINE | Facility: CLINIC | Age: 59
End: 2024-12-10
Payer: COMMERCIAL

## 2024-12-13 ENCOUNTER — PATIENT MESSAGE (OUTPATIENT)
Dept: FAMILY MEDICINE | Facility: CLINIC | Age: 59
End: 2024-12-13
Payer: COMMERCIAL

## 2024-12-19 ENCOUNTER — TELEPHONE (OUTPATIENT)
Dept: FAMILY MEDICINE | Facility: CLINIC | Age: 59
End: 2024-12-19
Payer: COMMERCIAL

## 2024-12-20 ENCOUNTER — LAB VISIT (OUTPATIENT)
Dept: LAB | Facility: HOSPITAL | Age: 59
End: 2024-12-20
Attending: FAMILY MEDICINE
Payer: COMMERCIAL

## 2024-12-20 ENCOUNTER — OFFICE VISIT (OUTPATIENT)
Dept: FAMILY MEDICINE | Facility: CLINIC | Age: 59
End: 2024-12-20
Payer: COMMERCIAL

## 2024-12-20 VITALS
BODY MASS INDEX: 24.71 KG/M2 | WEIGHT: 153.75 LBS | DIASTOLIC BLOOD PRESSURE: 76 MMHG | TEMPERATURE: 96 F | SYSTOLIC BLOOD PRESSURE: 118 MMHG | HEART RATE: 90 BPM | HEIGHT: 66 IN | OXYGEN SATURATION: 98 %

## 2024-12-20 DIAGNOSIS — E78.5 HYPERLIPIDEMIA, UNSPECIFIED HYPERLIPIDEMIA TYPE: Chronic | ICD-10-CM

## 2024-12-20 DIAGNOSIS — G20.A1 PARKINSON'S DISEASE WITHOUT DYSKINESIA OR FLUCTUATING MANIFESTATIONS: Chronic | ICD-10-CM

## 2024-12-20 DIAGNOSIS — M75.101 NONTRAUMATIC TEAR OF RIGHT ROTATOR CUFF, UNSPECIFIED TEAR EXTENT: Primary | ICD-10-CM

## 2024-12-20 DIAGNOSIS — R73.03 PREDIABETES: ICD-10-CM

## 2024-12-20 DIAGNOSIS — I10 ESSENTIAL HYPERTENSION: Chronic | ICD-10-CM

## 2024-12-20 LAB
ANION GAP SERPL CALC-SCNC: 8 MMOL/L (ref 8–16)
BASOPHILS # BLD AUTO: 0.01 K/UL (ref 0–0.2)
BASOPHILS NFR BLD: 0.2 % (ref 0–1.9)
BUN SERPL-MCNC: 10 MG/DL (ref 6–20)
CALCIUM SERPL-MCNC: 9.4 MG/DL (ref 8.7–10.5)
CHLORIDE SERPL-SCNC: 105 MMOL/L (ref 95–110)
CO2 SERPL-SCNC: 25 MMOL/L (ref 23–29)
CREAT SERPL-MCNC: 0.8 MG/DL (ref 0.5–1.4)
DIFFERENTIAL METHOD BLD: ABNORMAL
EOSINOPHIL # BLD AUTO: 0.2 K/UL (ref 0–0.5)
EOSINOPHIL NFR BLD: 4.3 % (ref 0–8)
ERYTHROCYTE [DISTWIDTH] IN BLOOD BY AUTOMATED COUNT: 14.3 % (ref 11.5–14.5)
EST. GFR  (NO RACE VARIABLE): >60 ML/MIN/1.73 M^2
GLUCOSE SERPL-MCNC: 90 MG/DL (ref 70–110)
HCT VFR BLD AUTO: 31 % (ref 37–48.5)
HGB BLD-MCNC: 9.9 G/DL (ref 12–16)
IMM GRANULOCYTES # BLD AUTO: 0.01 K/UL (ref 0–0.04)
IMM GRANULOCYTES NFR BLD AUTO: 0.2 % (ref 0–0.5)
LYMPHOCYTES # BLD AUTO: 1.8 K/UL (ref 1–4.8)
LYMPHOCYTES NFR BLD: 34 % (ref 18–48)
MCH RBC QN AUTO: 29 PG (ref 27–31)
MCHC RBC AUTO-ENTMCNC: 31.9 G/DL (ref 32–36)
MCV RBC AUTO: 91 FL (ref 82–98)
MONOCYTES # BLD AUTO: 0.4 K/UL (ref 0.3–1)
MONOCYTES NFR BLD: 7.9 % (ref 4–15)
NEUTROPHILS # BLD AUTO: 2.8 K/UL (ref 1.8–7.7)
NEUTROPHILS NFR BLD: 53.4 % (ref 38–73)
NRBC BLD-RTO: 0 /100 WBC
PLATELET # BLD AUTO: 283 K/UL (ref 150–450)
PMV BLD AUTO: 11.7 FL (ref 9.2–12.9)
POTASSIUM SERPL-SCNC: 3.9 MMOL/L (ref 3.5–5.1)
RBC # BLD AUTO: 3.41 M/UL (ref 4–5.4)
SODIUM SERPL-SCNC: 138 MMOL/L (ref 136–145)
WBC # BLD AUTO: 5.3 K/UL (ref 3.9–12.7)

## 2024-12-20 PROCEDURE — 85025 COMPLETE CBC W/AUTO DIFF WBC: CPT | Performed by: FAMILY MEDICINE

## 2024-12-20 PROCEDURE — 80048 BASIC METABOLIC PNL TOTAL CA: CPT | Performed by: FAMILY MEDICINE

## 2024-12-20 PROCEDURE — 36415 COLL VENOUS BLD VENIPUNCTURE: CPT | Mod: PO | Performed by: FAMILY MEDICINE

## 2024-12-20 PROCEDURE — 99999 PR PBB SHADOW E&M-EST. PATIENT-LVL III: CPT | Mod: PBBFAC,,, | Performed by: FAMILY MEDICINE

## 2024-12-20 NOTE — PROGRESS NOTES
CHIEF COMPLAINT:  This is a 59-year-old female here for preoperative clearance for right shoulder arthroscopy with rotator cuff repair per Dr. Jair Martines due to patient's chronic medical conditions.     SUBJECTIVE:  Patient has intractable right shoulder pain with decreased mobility due to rotator cuff tear. She had lumbar fusion in November and is recovering well. She denies previous problem with general anesthesia. She denies family history of perioperative complications.     The patient is taking Sinemet and Azilect for Parkinson's disease per her neurologist.  She has a tremor and was told that she had mild disease. She takes Mirapex for restless leg syndrome.  Her hypertension is controlled on HCTZ.  She has dyslipidemia for which she takes atorvastatin. She has prediabetes with last A1c 6%.   She had sleep study which indicated that she had narcolepsy which she rejects. She is exercising regularly.      ROS:  GENERAL: Patient denies fever, chills, night sweats. Patient denies weight gain or loss. Patient denies anorexia, fatigue, weakness or swollen glands.  SKIN: Patient denies rash or hair loss.  HEENT: Patient denies sore throat, ear pain, hearing loss, nasal congestion, or runny nose. Patient denies visual disturbance, eye irritation or discharge.  LUNGS: Patient denies cough, wheeze or hemoptysis.  CARDIOVASCULAR: Patient denies chest pain, shortness of breath, palpitations, syncope or lower extremity edema.  GI: Patient denies abdominal pain, nausea, vomiting, diarrhea, constipation, blood in stool or melena.  GENITOURINARY: Patient denies pelvic pain, vaginal discharge, itch or odor. Patient denies irregular vaginal bleeding. Patient denies dysuria, frequency, hematuria, nocturia, urgency or incontinence.  MUSCULOSKELETAL: Patient denies joint pain, swelling, redness or warmth.  Positive for back and shoulder pain.   NEUROLOGIC: Patient denies headache, vertigo, paresthesias, weakness in limb,  dysarthria, dysphagia or abnormality of gait.  PSYCHIATRIC: Patient denies anxiety, depression, or memory loss.     OBJECTIVE:   GENERAL: Well-developed well-nourished black female alert and oriented x3, in no acute distress. Memory, judgment and cognition without deficit.   SKIN: Clear without rash. Normal color and tone.   HEENT: Eyes: Clear conjunctivae. No scleral icterus. Pupils equal reactive to light and accommodation. Ears: Clear TMs. Clear canals. Nose: Without congestion. Pharynx: Without injection or exudates.   NECK: Supple, normal range of motion. No masses, lymphadenopathy or enlarged thyroid. No JVD. Carotids 2+ and equal. No bruits.   LUNGS: Clear to auscultation. Normal respiratory effort.   CARDIOVASCULAR: Regular rhythm, normal S1, S2 without murmur, gallop or rub.   BACK: No CVA or spinal tenderness.   EXTREMITIES: Without cyanosis, clubbing or edema. Distal pulses 2+ and equal. Normal range of motion in all extremities. No joint effusion, erythema or warmth.   NEUROLOGIC:  Motor strength equal bilaterally. Sensation normal to touch.  Gait without abnormality. No tremor.    Lab: Acceptable   EKG:  Normal sinus rhythm.  No acute changes.    ASSESSMENT:  1. Nontraumatic tear of right rotator cuff, unspecified tear extent    2. Essential hypertension    3. Hyperlipidemia, unspecified hyperlipidemia type    4. Prediabetes    5. Parkinson's disease without dyskinesia or fluctuating manifestations      PLAN:  1. Avoid aspirin, NSAIDs and fish oil 5-7 days prior to surgery.    2. Continue regular medications.    3. Patient is cleared for surgery.       Thank you for allowing me to participate in the care of this patient.     30 minutes of total time spent on the encounter, which includes face to face time and non-face to face time preparing to see the patient (eg, review of tests), Obtaining and/or reviewing separately obtained history, Documenting clinical information in the electronic or other health  record, Independently interpreting results (not separately reported) and communicating results to the patient/family/caregiver, or Care coordination (not separately reported).      This note is generated with speech recognition software and is subject to transcription error and sound alike phrases that may be missed by proofreading.

## 2024-12-24 ENCOUNTER — TELEPHONE (OUTPATIENT)
Dept: FAMILY MEDICINE | Facility: CLINIC | Age: 59
End: 2024-12-24
Payer: COMMERCIAL

## 2024-12-24 NOTE — TELEPHONE ENCOUNTER
----- Message from Lalita Patterson MD sent at 12/23/2024  5:33 PM CST -----  Please FAX preop note with lab, recent EKG and med list.

## 2025-01-13 ENCOUNTER — PATIENT MESSAGE (OUTPATIENT)
Dept: FAMILY MEDICINE | Facility: CLINIC | Age: 60
End: 2025-01-13
Payer: COMMERCIAL

## 2025-02-03 ENCOUNTER — PATIENT MESSAGE (OUTPATIENT)
Dept: FAMILY MEDICINE | Facility: CLINIC | Age: 60
End: 2025-02-03
Payer: COMMERCIAL

## 2025-02-09 NOTE — TELEPHONE ENCOUNTER
No care due was identified.  [facility] Embedded Care Due Messages. Reference number: [id number].   2/09/2025 9:24:19 AM CST

## 2025-02-10 RX ORDER — HYDROCHLOROTHIAZIDE 12.5 MG/1
12.5 CAPSULE ORAL
Qty: 90 CAPSULE | Refills: 3 | Status: SHIPPED | OUTPATIENT
Start: 2025-02-10

## 2025-02-24 ENCOUNTER — HOSPITAL ENCOUNTER (OUTPATIENT)
Dept: RADIOLOGY | Facility: HOSPITAL | Age: 60
Discharge: HOME | End: 2025-02-24
Attending: FAMILY MEDICINE
Payer: COMMERCIAL

## 2025-02-24 VITALS — WEIGHT: 152.13 LBS | HEIGHT: 66 IN | BODY MASS INDEX: 24.45 KG/M2

## 2025-02-24 DIAGNOSIS — Z12.31 ENCOUNTER FOR SCREENING MAMMOGRAM FOR BREAST CANCER: ICD-10-CM

## 2025-02-24 PROCEDURE — 77067 SCR MAMMO BI INCL CAD: CPT | Mod: 26,,, | Performed by: STUDENT IN AN ORGANIZED HEALTH CARE EDUCATION/TRAINING PROGRAM

## 2025-02-24 PROCEDURE — 77063 BREAST TOMOSYNTHESIS BI: CPT | Mod: 26,,, | Performed by: STUDENT IN AN ORGANIZED HEALTH CARE EDUCATION/TRAINING PROGRAM

## 2025-02-24 PROCEDURE — 77063 BREAST TOMOSYNTHESIS BI: CPT | Mod: TC

## 2025-02-25 ENCOUNTER — RESULTS FOLLOW-UP (OUTPATIENT)
Dept: FAMILY MEDICINE | Facility: CLINIC | Age: 60
End: 2025-02-25

## 2025-03-21 ENCOUNTER — PATIENT MESSAGE (OUTPATIENT)
Dept: FAMILY MEDICINE | Facility: CLINIC | Age: 60
End: 2025-03-21
Payer: COMMERCIAL

## 2025-07-06 NOTE — PROGRESS NOTES
CHIEF COMPLAINT:  This is a 60-year-old female here for preventive health exam.     SUBJECTIVE:  Patient is doing well without complaints except for flare up of lower back pain. She had lumbar fusion in November 2024. She requests physical therapy. She is status post right rotator cuff surgery in January 2025.  She complains of pain and stiffness in right shoulder and requests physical therapy.       The patient is taking Sinemet and Azilect for Parkinson's disease per her neurologist.  She has a tremor and was told that she had mild disease. She takes Mirapex for restless leg syndrome.  Her hypertension is controlled on HCTZ.  She has dyslipidemia for which she takes atorvastatin. She has prediabetes with last A1c 6.2% one year ago.   She had sleep study which indicated that she had narcolepsy which she rejects. She is exercising regularly.     Eye exam 2025. Mammogram February 2025. Colonoscopy February 2023 due February 2028. Tdap September 2011. Flu vaccine October 2024. COVID 19 vaccine March, April, October 2021, October 2022. Shingrix series completed.  Pneumovax October 2020.     ROS:  GENERAL: Patient denies fever, chills, night sweats. Patient denies weight gain or loss. Patient denies anorexia, fatigue, weakness or swollen glands.  SKIN: Patient denies rash or hair loss.  HEENT: Patient denies sore throat, ear pain, hearing loss, nasal congestion, or runny nose. Patient denies visual disturbance, eye irritation or discharge.  LUNGS: Patient denies cough, wheeze or hemoptysis.  CARDIOVASCULAR: Patient denies chest pain, shortness of breath, palpitations, syncope or lower extremity edema.  GI: Patient denies abdominal pain, nausea, vomiting, diarrhea, constipation, blood in stool or melena.  GENITOURINARY: Patient denies pelvic pain, vaginal discharge, itch or odor. Patient denies irregular vaginal bleeding. Patient denies dysuria, frequency, hematuria, nocturia, urgency or incontinence.  BREASTS: Patient  denies breast pain, mass or nipple discharge.  MUSCULOSKELETAL: Patient denies joint pain, swelling, redness or warmth.  NEUROLOGIC: Patient denies headache, vertigo, paresthesias, weakness in limb, dysarthria, dysphagia or abnormality of gait.  PSYCHIATRIC: Patient denies anxiety, depression, or memory loss.     OBJECTIVE:   GENERAL: Well-developed well-nourished black female alert and oriented x3, in no acute distress. Memory, judgment and cognition without deficit.   SKIN: Clear without rash. Normal color and tone.   HEENT: Eyes: Clear conjunctivae. No scleral icterus. Pupils equal reactive to light and accommodation. Ears: Clear TMs. Clear canals. Nose: Without congestion. Pharynx: Without injection or exudates.   NECK: Supple, normal range of motion. No masses, lymphadenopathy or enlarged thyroid. No JVD. Carotids 2+ and equal. No bruits.   LUNGS: Clear to auscultation. Normal respiratory effort.   CARDIOVASCULAR: Regular rhythm, normal S1, S2 without murmur, gallop or rub.   BACK: No CVA or spinal tenderness.   BREASTS: No masses, tenderness or nipple discharge.   ABDOMEN: Normal appearance. Active bowel sounds. Soft, nontender without mass or organomegaly. No rebound or guarding. Well-healed incisional scars.   EXTREMITIES: Without cyanosis, clubbing or edema. Distal pulses 2+ and equal. Normal range of motion in all extremities. No joint effusion, erythema or warmth.   NEUROLOGIC: Cranial nerves II through XII without deficit. Motor strength equal bilaterally. Sensation normal to touch. Deep tendon reflexes 2+ and equal. Gait without abnormality. No tremor. Negative cerebellar signs.   PELVIC:  External: Without lesions or inflammation. Vaginal: Cuff intact. Bimanual: Normal size and shape. Adnexa: Non-tender, without masses. Rectovaginal: Confirms, heme-negative stool x2.     ASSESSMENT:  1. Preventative health care    2. Essential hypertension    3. Parkinson's disease without dyskinesia or fluctuating  manifestations    4. Hyperlipidemia, unspecified hyperlipidemia type    5. Prediabetes    6. Lumbar radiculopathy    7. Acute pain of right shoulder    8. Need for vaccination      PLAN:  1. Weight reduction. Exercise regularly.  2. Age-appropriate counseling.  3. Fasting lab.  4. Physical therapy.    5. Prevnar 20.    6. Refill medications as needed.    7. Follow-up annually.      This note is generated with speech recognition software and is subject to transcription error and sound alike phrases that may be missed by proofreading.

## 2025-07-07 ENCOUNTER — LAB VISIT (OUTPATIENT)
Dept: LAB | Facility: HOSPITAL | Age: 60
End: 2025-07-07
Attending: FAMILY MEDICINE
Payer: COMMERCIAL

## 2025-07-07 ENCOUNTER — PATIENT MESSAGE (OUTPATIENT)
Dept: FAMILY MEDICINE | Facility: CLINIC | Age: 60
End: 2025-07-07

## 2025-07-07 ENCOUNTER — OFFICE VISIT (OUTPATIENT)
Dept: FAMILY MEDICINE | Facility: CLINIC | Age: 60
End: 2025-07-07
Payer: COMMERCIAL

## 2025-07-07 VITALS
TEMPERATURE: 96 F | HEART RATE: 85 BPM | OXYGEN SATURATION: 96 % | SYSTOLIC BLOOD PRESSURE: 132 MMHG | DIASTOLIC BLOOD PRESSURE: 74 MMHG | WEIGHT: 159.94 LBS | BODY MASS INDEX: 25.71 KG/M2 | HEIGHT: 66 IN

## 2025-07-07 DIAGNOSIS — Z23 NEED FOR VACCINATION: ICD-10-CM

## 2025-07-07 DIAGNOSIS — G20.A1 PARKINSON'S DISEASE WITHOUT DYSKINESIA OR FLUCTUATING MANIFESTATIONS: Chronic | ICD-10-CM

## 2025-07-07 DIAGNOSIS — I10 ESSENTIAL HYPERTENSION: Chronic | ICD-10-CM

## 2025-07-07 DIAGNOSIS — E78.5 HYPERLIPIDEMIA, UNSPECIFIED HYPERLIPIDEMIA TYPE: Chronic | ICD-10-CM

## 2025-07-07 DIAGNOSIS — M54.16 LUMBAR RADICULOPATHY: ICD-10-CM

## 2025-07-07 DIAGNOSIS — Z00.00 PREVENTATIVE HEALTH CARE: Primary | ICD-10-CM

## 2025-07-07 DIAGNOSIS — M25.511 ACUTE PAIN OF RIGHT SHOULDER: ICD-10-CM

## 2025-07-07 DIAGNOSIS — Z00.00 PREVENTATIVE HEALTH CARE: ICD-10-CM

## 2025-07-07 DIAGNOSIS — R73.03 PREDIABETES: ICD-10-CM

## 2025-07-07 LAB
ABSOLUTE EOSINOPHIL (OHS): 0.13 K/UL
ABSOLUTE MONOCYTE (OHS): 0.25 K/UL (ref 0.3–1)
ABSOLUTE NEUTROPHIL COUNT (OHS): 1.65 K/UL (ref 1.8–7.7)
ALBUMIN SERPL BCP-MCNC: 3.9 G/DL (ref 3.5–5.2)
ALP SERPL-CCNC: 126 UNIT/L (ref 40–150)
ALT SERPL W/O P-5'-P-CCNC: 20 UNIT/L (ref 10–44)
ANION GAP (OHS): 8 MMOL/L (ref 8–16)
AST SERPL-CCNC: 61 UNIT/L (ref 11–45)
BASOPHILS # BLD AUTO: 0.02 K/UL
BASOPHILS NFR BLD AUTO: 0.6 %
BILIRUB SERPL-MCNC: 0.5 MG/DL (ref 0.1–1)
BUN SERPL-MCNC: 17 MG/DL (ref 8–23)
CALCIUM SERPL-MCNC: 9.6 MG/DL (ref 8.7–10.5)
CHLORIDE SERPL-SCNC: 105 MMOL/L (ref 95–110)
CHOLEST SERPL-MCNC: 180 MG/DL (ref 120–199)
CHOLEST/HDLC SERPL: 2.5 {RATIO} (ref 2–5)
CO2 SERPL-SCNC: 27 MMOL/L (ref 23–29)
CREAT SERPL-MCNC: 0.9 MG/DL (ref 0.5–1.4)
EAG (OHS): 126 MG/DL (ref 68–131)
ERYTHROCYTE [DISTWIDTH] IN BLOOD BY AUTOMATED COUNT: 15.2 % (ref 11.5–14.5)
GFR SERPLBLD CREATININE-BSD FMLA CKD-EPI: >60 ML/MIN/1.73/M2
GLUCOSE SERPL-MCNC: 94 MG/DL (ref 70–110)
HBA1C MFR BLD: 6 % (ref 4–5.6)
HCT VFR BLD AUTO: 36.2 % (ref 37–48.5)
HDLC SERPL-MCNC: 73 MG/DL (ref 40–75)
HDLC SERPL: 40.6 % (ref 20–50)
HGB BLD-MCNC: 11.4 GM/DL (ref 12–16)
IMM GRANULOCYTES # BLD AUTO: 0 K/UL (ref 0–0.04)
IMM GRANULOCYTES NFR BLD AUTO: 0 % (ref 0–0.5)
LDLC SERPL CALC-MCNC: 97.8 MG/DL (ref 63–159)
LYMPHOCYTES # BLD AUTO: 1.31 K/UL (ref 1–4.8)
MCH RBC QN AUTO: 28 PG (ref 27–31)
MCHC RBC AUTO-ENTMCNC: 31.5 G/DL (ref 32–36)
MCV RBC AUTO: 89 FL (ref 82–98)
NONHDLC SERPL-MCNC: 107 MG/DL
NUCLEATED RBC (/100WBC) (OHS): 0 /100 WBC
PLATELET # BLD AUTO: 232 K/UL (ref 150–450)
PMV BLD AUTO: 12 FL (ref 9.2–12.9)
POTASSIUM SERPL-SCNC: 4.2 MMOL/L (ref 3.5–5.1)
PROT SERPL-MCNC: 7 GM/DL (ref 6–8.4)
RBC # BLD AUTO: 4.07 M/UL (ref 4–5.4)
RELATIVE EOSINOPHIL (OHS): 3.9 %
RELATIVE LYMPHOCYTE (OHS): 39 % (ref 18–48)
RELATIVE MONOCYTE (OHS): 7.4 % (ref 4–15)
RELATIVE NEUTROPHIL (OHS): 49.1 % (ref 38–73)
SODIUM SERPL-SCNC: 140 MMOL/L (ref 136–145)
TRIGL SERPL-MCNC: 46 MG/DL (ref 30–150)
TSH SERPL-ACNC: 0.76 UIU/ML (ref 0.4–4)
WBC # BLD AUTO: 3.36 K/UL (ref 3.9–12.7)

## 2025-07-07 PROCEDURE — 36415 COLL VENOUS BLD VENIPUNCTURE: CPT | Mod: PO

## 2025-07-07 PROCEDURE — 83036 HEMOGLOBIN GLYCOSYLATED A1C: CPT

## 2025-07-07 PROCEDURE — 99999 PR PBB SHADOW E&M-EST. PATIENT-LVL III: CPT | Mod: PBBFAC,,, | Performed by: FAMILY MEDICINE

## 2025-07-07 PROCEDURE — 80061 LIPID PANEL: CPT

## 2025-07-07 PROCEDURE — 85025 COMPLETE CBC W/AUTO DIFF WBC: CPT

## 2025-07-07 PROCEDURE — 84443 ASSAY THYROID STIM HORMONE: CPT

## 2025-07-07 PROCEDURE — 99396 PREV VISIT EST AGE 40-64: CPT | Mod: S$GLB,,, | Performed by: FAMILY MEDICINE

## 2025-07-07 PROCEDURE — 80053 COMPREHEN METABOLIC PANEL: CPT

## 2025-07-07 RX ORDER — MELOXICAM 15 MG/1
15 TABLET ORAL DAILY
COMMUNITY

## 2025-07-10 ENCOUNTER — PATIENT MESSAGE (OUTPATIENT)
Dept: FAMILY MEDICINE | Facility: CLINIC | Age: 60
End: 2025-07-10
Payer: COMMERCIAL

## 2025-07-11 ENCOUNTER — PATIENT MESSAGE (OUTPATIENT)
Dept: FAMILY MEDICINE | Facility: CLINIC | Age: 60
End: 2025-07-11
Payer: COMMERCIAL

## 2025-07-23 ENCOUNTER — PATIENT MESSAGE (OUTPATIENT)
Dept: FAMILY MEDICINE | Facility: CLINIC | Age: 60
End: 2025-07-23
Payer: COMMERCIAL

## 2025-07-28 ENCOUNTER — TELEPHONE (OUTPATIENT)
Dept: FAMILY MEDICINE | Facility: CLINIC | Age: 60
End: 2025-07-28
Payer: COMMERCIAL

## 2025-07-28 DIAGNOSIS — M25.511 ACUTE PAIN OF RIGHT SHOULDER: ICD-10-CM

## 2025-07-28 DIAGNOSIS — M54.16 LUMBAR RADICULOPATHY: Primary | ICD-10-CM

## 2025-07-28 NOTE — TELEPHONE ENCOUNTER
07/07/2025      [facility] is asking for  order to be changed to OT for her shoulders,PT for her back.

## (undated) DEVICE — SOL IRR NACL .9% 3000ML

## (undated) DEVICE — SEE MEDLINE ITEM 157117

## (undated) DEVICE — SEE MEDLINE ITEM 152739

## (undated) DEVICE — SET DECANTER MEDICHOICE

## (undated) DEVICE — SHAVER BARREL BUR 12-FLUTE 4.0

## (undated) DEVICE — SYR ONLY LUER LOCK 20CC

## (undated) DEVICE — SHEET XLGE DRAPE

## (undated) DEVICE — NDL SPINAL 18GX3.5 SPINOCAN

## (undated) DEVICE — ALCOHOL 70% ISOP RUBBING 4OZ

## (undated) DEVICE — ELECTRODE VAPR S 90 4.0MM

## (undated) DEVICE — CANNULA ARTHSCP SMOOTH 5.5X75

## (undated) DEVICE — SEE MEDLINE ITEM 157027

## (undated) DEVICE — TAPE SILK 3IN

## (undated) DEVICE — SEE MEDLINE ITEM 157131

## (undated) DEVICE — GLOVE PROTEXIS HYDROGEL SZ8

## (undated) DEVICE — POSITIONER HEAD DONUT 9IN FOAM

## (undated) DEVICE — SOL NACL 0.9% INJ 250ML BG

## (undated) DEVICE — SYR 30CC LUER LOCK

## (undated) DEVICE — SLING ARM ULTRA III PAD MED

## (undated) DEVICE — GLOVE 8.5 PROTEXIS PI BLUE

## (undated) DEVICE — GAUZE SPONGE 4X4 12PLY

## (undated) DEVICE — SYR 3CC LUER LOC

## (undated) DEVICE — NDL HYPODERMIC BLUNT 18G 1.5IN

## (undated) DEVICE — GLOVE 8 PROTEXIS PI BLUE

## (undated) DEVICE — MAT ROLL

## (undated) DEVICE — TUBING CROSSFLOW INFLOW CASS

## (undated) DEVICE — ELECTRODE REM PLYHSV RETURN 9

## (undated) DEVICE — GLOVE PROTEXIS HYDROGEL SZ7.5

## (undated) DEVICE — SHAVER RESECTOR 4.0

## (undated) DEVICE — SEE MEDLINE ITEM 152529

## (undated) DEVICE — DRAPE PLASTIC U 60X72

## (undated) DEVICE — KIT TRIMANO CHIN

## (undated) DEVICE — BIT DRILL 2.4MM GRYPHON

## (undated) DEVICE — CONTAINER SPECIMEN STRL 4OZ

## (undated) DEVICE — SUPPORT ULNA NERVE PROTECTOR

## (undated) DEVICE — SEE MEDLINE ITEM 157166

## (undated) DEVICE — DEVICE CHIA PERCPASSER

## (undated) DEVICE — DRAPE STERI INSTRUMENT 1018

## (undated) DEVICE — APPLICATOR CHLORAPREP ORN 26ML

## (undated) DEVICE — TAPE SURGICAL MICROPORE 3IN

## (undated) DEVICE — TIP SUCTION YANKAUER

## (undated) DEVICE — SEE MEDLINE ITEM 152622

## (undated) DEVICE — DECANTER VIAL ASEPTIC TRANSFER

## (undated) DEVICE — MANIFOLD 4 PORT

## (undated) DEVICE — SEE MEDLINE ITEM 157216

## (undated) DEVICE — SCRUB 10% POVIDONE IODINE 4OZ